# Patient Record
Sex: MALE | Race: OTHER | Employment: FULL TIME | URBAN - METROPOLITAN AREA
[De-identification: names, ages, dates, MRNs, and addresses within clinical notes are randomized per-mention and may not be internally consistent; named-entity substitution may affect disease eponyms.]

---

## 2022-02-08 ENCOUNTER — HOSPITAL ENCOUNTER (EMERGENCY)
Age: 67
Discharge: HOME | End: 2022-02-08
Attending: EMERGENCY MEDICINE
Payer: COMMERCIAL

## 2022-02-08 ENCOUNTER — APPOINTMENT (OUTPATIENT)
Dept: GENERAL RADIOLOGY | Age: 67
End: 2022-02-08
Payer: COMMERCIAL

## 2022-02-08 VITALS
HEIGHT: 67 IN | RESPIRATION RATE: 16 BRPM | SYSTOLIC BLOOD PRESSURE: 105 MMHG | WEIGHT: 200.6 LBS | HEART RATE: 91 BPM | BODY MASS INDEX: 31.48 KG/M2 | TEMPERATURE: 99.2 F | DIASTOLIC BLOOD PRESSURE: 67 MMHG | OXYGEN SATURATION: 96 %

## 2022-02-08 DIAGNOSIS — J18.9 PNEUMONIA DUE TO INFECTIOUS ORGANISM, UNSPECIFIED LATERALITY, UNSPECIFIED PART OF LUNG: Primary | ICD-10-CM

## 2022-02-08 LAB
A/G RATIO: 1.1 (ref 1.1–2.2)
ALBUMIN SERPL-MCNC: 3.9 G/DL (ref 3.4–5)
ALP BLD-CCNC: 119 U/L (ref 40–129)
ALT SERPL-CCNC: 16 U/L (ref 10–40)
ANION GAP SERPL CALCULATED.3IONS-SCNC: 11 MMOL/L (ref 3–16)
AST SERPL-CCNC: 22 U/L (ref 15–37)
BASOPHILS ABSOLUTE: 0 K/UL (ref 0–0.2)
BASOPHILS RELATIVE PERCENT: 0 %
BILIRUB SERPL-MCNC: 1 MG/DL (ref 0–1)
BUN BLDV-MCNC: 16 MG/DL (ref 7–20)
CALCIUM SERPL-MCNC: 9.8 MG/DL (ref 8.3–10.6)
CHLORIDE BLD-SCNC: 102 MMOL/L (ref 99–110)
CO2: 23 MMOL/L (ref 21–32)
CREAT SERPL-MCNC: 1.1 MG/DL (ref 0.8–1.3)
EOSINOPHILS ABSOLUTE: 0.1 K/UL (ref 0–0.6)
EOSINOPHILS RELATIVE PERCENT: 1.6 %
GFR AFRICAN AMERICAN: >60
GFR NON-AFRICAN AMERICAN: >60
GLUCOSE BLD-MCNC: 109 MG/DL (ref 70–99)
HCT VFR BLD CALC: 41.5 % (ref 40.5–52.5)
HEMOGLOBIN: 13.7 G/DL (ref 13.5–17.5)
LACTIC ACID: 1.1 MMOL/L (ref 0.4–2)
LIPASE: 25 U/L (ref 13–60)
LYMPHOCYTES ABSOLUTE: 1 K/UL (ref 1–5.1)
LYMPHOCYTES RELATIVE PERCENT: 12.2 %
MCH RBC QN AUTO: 28.8 PG (ref 26–34)
MCHC RBC AUTO-ENTMCNC: 33 G/DL (ref 31–36)
MCV RBC AUTO: 87.3 FL (ref 80–100)
MONOCYTES ABSOLUTE: 0.9 K/UL (ref 0–1.3)
MONOCYTES RELATIVE PERCENT: 10.9 %
NEUTROPHILS ABSOLUTE: 6.1 K/UL (ref 1.7–7.7)
NEUTROPHILS RELATIVE PERCENT: 75.3 %
PDW BLD-RTO: 14.5 % (ref 12.4–15.4)
PLATELET # BLD: 238 K/UL (ref 135–450)
PMV BLD AUTO: 8.6 FL (ref 5–10.5)
POTASSIUM SERPL-SCNC: 4.5 MMOL/L (ref 3.5–5.1)
RAPID INFLUENZA  B AGN: NEGATIVE
RAPID INFLUENZA A AGN: NEGATIVE
RBC # BLD: 4.76 M/UL (ref 4.2–5.9)
S PYO AG THROAT QL: NEGATIVE
SARS-COV-2, NAAT: NOT DETECTED
SODIUM BLD-SCNC: 136 MMOL/L (ref 136–145)
TOTAL PROTEIN: 7.6 G/DL (ref 6.4–8.2)
WBC # BLD: 8.1 K/UL (ref 4–11)

## 2022-02-08 PROCEDURE — 87880 STREP A ASSAY W/OPTIC: CPT

## 2022-02-08 PROCEDURE — 83605 ASSAY OF LACTIC ACID: CPT

## 2022-02-08 PROCEDURE — 83690 ASSAY OF LIPASE: CPT

## 2022-02-08 PROCEDURE — 87040 BLOOD CULTURE FOR BACTERIA: CPT

## 2022-02-08 PROCEDURE — 85025 COMPLETE CBC W/AUTO DIFF WBC: CPT

## 2022-02-08 PROCEDURE — 2580000003 HC RX 258: Performed by: EMERGENCY MEDICINE

## 2022-02-08 PROCEDURE — 96360 HYDRATION IV INFUSION INIT: CPT

## 2022-02-08 PROCEDURE — 6370000000 HC RX 637 (ALT 250 FOR IP): Performed by: EMERGENCY MEDICINE

## 2022-02-08 PROCEDURE — 99284 EMERGENCY DEPT VISIT MOD MDM: CPT

## 2022-02-08 PROCEDURE — 87804 INFLUENZA ASSAY W/OPTIC: CPT

## 2022-02-08 PROCEDURE — 96361 HYDRATE IV INFUSION ADD-ON: CPT

## 2022-02-08 PROCEDURE — 36415 COLL VENOUS BLD VENIPUNCTURE: CPT

## 2022-02-08 PROCEDURE — 87635 SARS-COV-2 COVID-19 AMP PRB: CPT

## 2022-02-08 PROCEDURE — 71045 X-RAY EXAM CHEST 1 VIEW: CPT

## 2022-02-08 PROCEDURE — 87081 CULTURE SCREEN ONLY: CPT

## 2022-02-08 PROCEDURE — 80053 COMPREHEN METABOLIC PANEL: CPT

## 2022-02-08 RX ORDER — CLOPIDOGREL BISULFATE 75 MG/1
75 TABLET ORAL DAILY
COMMUNITY
End: 2022-08-31 | Stop reason: SDUPTHER

## 2022-02-08 RX ORDER — 0.9 % SODIUM CHLORIDE 0.9 %
30 INTRAVENOUS SOLUTION INTRAVENOUS ONCE
Status: COMPLETED | OUTPATIENT
Start: 2022-02-08 | End: 2022-02-08

## 2022-02-08 RX ORDER — LEVOFLOXACIN 750 MG/1
750 TABLET ORAL DAILY
Qty: 5 TABLET | Refills: 0 | Status: SHIPPED | OUTPATIENT
Start: 2022-02-09 | End: 2022-02-14

## 2022-02-08 RX ORDER — ACETAMINOPHEN 325 MG/1
650 TABLET ORAL ONCE
Status: COMPLETED | OUTPATIENT
Start: 2022-02-08 | End: 2022-02-08

## 2022-02-08 RX ORDER — IBUPROFEN 400 MG/1
400 TABLET ORAL ONCE
Status: COMPLETED | OUTPATIENT
Start: 2022-02-08 | End: 2022-02-08

## 2022-02-08 RX ORDER — LEVOFLOXACIN 250 MG/1
750 TABLET ORAL ONCE
Status: COMPLETED | OUTPATIENT
Start: 2022-02-08 | End: 2022-02-08

## 2022-02-08 RX ADMIN — ACETAMINOPHEN 650 MG: 325 TABLET ORAL at 13:57

## 2022-02-08 RX ADMIN — SODIUM CHLORIDE 1983 ML: 9 INJECTION, SOLUTION INTRAVENOUS at 14:37

## 2022-02-08 RX ADMIN — LEVOFLOXACIN 750 MG: 250 TABLET, FILM COATED ORAL at 15:41

## 2022-02-08 RX ADMIN — IBUPROFEN 400 MG: 400 TABLET, FILM COATED ORAL at 13:57

## 2022-02-08 ASSESSMENT — PAIN SCALES - GENERAL
PAINLEVEL_OUTOF10: 3
PAINLEVEL_OUTOF10: 3
PAINLEVEL_OUTOF10: 0

## 2022-02-08 ASSESSMENT — PAIN DESCRIPTION - PAIN TYPE: TYPE: ACUTE PAIN

## 2022-02-08 NOTE — LETTER
[place] [address]  Phone: [phone]               February 8, 2022    Patient: Harriett Richardson   YOB: 1955   Date of Visit: 2/8/2022       To Whom It May Concern:    Harriett Richardson was seen and treated in our emergency department on 2/8/2022.  He may return to work 2/10/22      Sincerely,       Arabella Quezada RN         Signature:__________________________________

## 2022-02-11 LAB — S PYO THROAT QL CULT: NORMAL

## 2022-02-13 LAB
BLOOD CULTURE, ROUTINE: NORMAL
CULTURE, BLOOD 2: NORMAL

## 2022-05-19 ENCOUNTER — OFFICE VISIT (OUTPATIENT)
Dept: INTERNAL MEDICINE CLINIC | Age: 67
End: 2022-05-19
Payer: COMMERCIAL

## 2022-05-19 VITALS
SYSTOLIC BLOOD PRESSURE: 130 MMHG | HEIGHT: 66 IN | DIASTOLIC BLOOD PRESSURE: 88 MMHG | WEIGHT: 209 LBS | BODY MASS INDEX: 33.59 KG/M2 | HEART RATE: 83 BPM | OXYGEN SATURATION: 97 %

## 2022-05-19 DIAGNOSIS — I25.10 CORONARY ARTERY DISEASE DUE TO LIPID RICH PLAQUE: ICD-10-CM

## 2022-05-19 DIAGNOSIS — I25.5 ISCHEMIC CARDIOMYOPATHY: ICD-10-CM

## 2022-05-19 DIAGNOSIS — K21.00 GASTROESOPHAGEAL REFLUX DISEASE WITH ESOPHAGITIS WITHOUT HEMORRHAGE: ICD-10-CM

## 2022-05-19 DIAGNOSIS — K44.9 HIATAL HERNIA: ICD-10-CM

## 2022-05-19 DIAGNOSIS — Z12.5 SCREENING FOR PROSTATE CANCER: ICD-10-CM

## 2022-05-19 DIAGNOSIS — I10 PRIMARY HYPERTENSION: ICD-10-CM

## 2022-05-19 DIAGNOSIS — I25.5 ISCHEMIC CARDIOMYOPATHY: Primary | ICD-10-CM

## 2022-05-19 DIAGNOSIS — I25.83 CORONARY ARTERY DISEASE DUE TO LIPID RICH PLAQUE: ICD-10-CM

## 2022-05-19 DIAGNOSIS — R06.00 NOCTURNAL DYSPNEA: ICD-10-CM

## 2022-05-19 DIAGNOSIS — Z12.11 SCREEN FOR COLON CANCER: ICD-10-CM

## 2022-05-19 DIAGNOSIS — Z78.9 LANGUAGE BARRIER TO COMMUNICATION: ICD-10-CM

## 2022-05-19 LAB
BILIRUBIN URINE: NEGATIVE
BLOOD, URINE: NEGATIVE
CLARITY: CLEAR
COLOR: YELLOW
GLUCOSE URINE: NEGATIVE MG/DL
HCT VFR BLD CALC: 45 % (ref 40.5–52.5)
HEMOGLOBIN: 14.6 G/DL (ref 13.5–17.5)
KETONES, URINE: NEGATIVE MG/DL
LEUKOCYTE ESTERASE, URINE: NEGATIVE
MCH RBC QN AUTO: 29.9 PG (ref 26–34)
MCHC RBC AUTO-ENTMCNC: 32.5 G/DL (ref 31–36)
MCV RBC AUTO: 91.8 FL (ref 80–100)
MICROSCOPIC EXAMINATION: NORMAL
NITRITE, URINE: NEGATIVE
PDW BLD-RTO: 14 % (ref 12.4–15.4)
PH UA: 5.5 (ref 5–8)
PLATELET # BLD: 198 K/UL (ref 135–450)
PMV BLD AUTO: 8.9 FL (ref 5–10.5)
PROTEIN UA: NEGATIVE MG/DL
RBC # BLD: 4.9 M/UL (ref 4.2–5.9)
SPECIFIC GRAVITY UA: 1.02 (ref 1–1.03)
URINE TYPE: NORMAL
UROBILINOGEN, URINE: 1 E.U./DL
WBC # BLD: 7 K/UL (ref 4–11)

## 2022-05-19 PROCEDURE — G8417 CALC BMI ABV UP PARAM F/U: HCPCS | Performed by: INTERNAL MEDICINE

## 2022-05-19 PROCEDURE — 1036F TOBACCO NON-USER: CPT | Performed by: INTERNAL MEDICINE

## 2022-05-19 PROCEDURE — 3017F COLORECTAL CA SCREEN DOC REV: CPT | Performed by: INTERNAL MEDICINE

## 2022-05-19 PROCEDURE — 99204 OFFICE O/P NEW MOD 45 MIN: CPT | Performed by: INTERNAL MEDICINE

## 2022-05-19 PROCEDURE — G8427 DOCREV CUR MEDS BY ELIG CLIN: HCPCS | Performed by: INTERNAL MEDICINE

## 2022-05-19 RX ORDER — SPIRONOLACTONE 25 MG/1
25 TABLET ORAL DAILY
COMMUNITY

## 2022-05-19 RX ORDER — CARVEDILOL 12.5 MG/1
12.5 TABLET ORAL 2 TIMES DAILY WITH MEALS
COMMUNITY

## 2022-05-19 RX ORDER — FAMOTIDINE 20 MG/1
20 TABLET, FILM COATED ORAL NIGHTLY PRN
Qty: 90 TABLET | Refills: 0 | Status: SHIPPED | OUTPATIENT
Start: 2022-05-19

## 2022-05-19 RX ORDER — ROSUVASTATIN CALCIUM 20 MG/1
20 TABLET, COATED ORAL DAILY
COMMUNITY
End: 2022-08-31

## 2022-05-19 RX ORDER — FUROSEMIDE 40 MG/1
40 TABLET ORAL 2 TIMES DAILY
COMMUNITY
End: 2022-07-25

## 2022-05-19 ASSESSMENT — PATIENT HEALTH QUESTIONNAIRE - PHQ9
2. FEELING DOWN, DEPRESSED OR HOPELESS: 0
SUM OF ALL RESPONSES TO PHQ QUESTIONS 1-9: 0
SUM OF ALL RESPONSES TO PHQ QUESTIONS 1-9: 0
SUM OF ALL RESPONSES TO PHQ9 QUESTIONS 1 & 2: 0
SUM OF ALL RESPONSES TO PHQ QUESTIONS 1-9: 0
1. LITTLE INTEREST OR PLEASURE IN DOING THINGS: 0
SUM OF ALL RESPONSES TO PHQ QUESTIONS 1-9: 0

## 2022-05-19 ASSESSMENT — ENCOUNTER SYMPTOMS
NAUSEA: 0
VOMITING: 0
CHEST TIGHTNESS: 0
PHOTOPHOBIA: 0
ABDOMINAL PAIN: 0
VOICE CHANGE: 0
TROUBLE SWALLOWING: 0
WHEEZING: 0

## 2022-05-19 NOTE — LETTER
[facility]  [address] [place], [place] [address]  Phone: [phone]  Fax: [phone]    Mimi Mejia MD        May 19, 2022     Patient: Courtney Landrum   YOB: 1955   Date of Visit: 5/19/2022       To Whom It May Concern: It is my medical opinion that Courtney Landrum may return to work on 05/20/2022. If you have any questions or concerns, please don't hesitate to call.     Sincerely,      Electronically signed by Mimi Mejia MD on 5/19/2022 at 10:45 AM    Mimi Mejia MD

## 2022-05-20 LAB
A/G RATIO: 1.4 (ref 1.1–2.2)
ALBUMIN SERPL-MCNC: 4.1 G/DL (ref 3.4–5)
ALP BLD-CCNC: 101 U/L (ref 40–129)
ALT SERPL-CCNC: 17 U/L (ref 10–40)
ANION GAP SERPL CALCULATED.3IONS-SCNC: 12 MMOL/L (ref 3–16)
AST SERPL-CCNC: 19 U/L (ref 15–37)
BILIRUB SERPL-MCNC: 0.7 MG/DL (ref 0–1)
BUN BLDV-MCNC: 17 MG/DL (ref 7–20)
CALCIUM SERPL-MCNC: 9.9 MG/DL (ref 8.3–10.6)
CHLORIDE BLD-SCNC: 102 MMOL/L (ref 99–110)
CHOLESTEROL, TOTAL: 201 MG/DL (ref 0–199)
CO2: 24 MMOL/L (ref 21–32)
CREAT SERPL-MCNC: 1.2 MG/DL (ref 0.8–1.3)
GFR AFRICAN AMERICAN: >60
GFR NON-AFRICAN AMERICAN: >60
GLUCOSE BLD-MCNC: 99 MG/DL (ref 70–99)
HDLC SERPL-MCNC: 42 MG/DL (ref 40–60)
LDL CHOLESTEROL CALCULATED: 142 MG/DL
POTASSIUM SERPL-SCNC: 4.6 MMOL/L (ref 3.5–5.1)
PRO-BNP: 1570 PG/ML (ref 0–124)
PROSTATE SPECIFIC ANTIGEN: 1.33 NG/ML (ref 0–4)
SODIUM BLD-SCNC: 138 MMOL/L (ref 136–145)
TOTAL PROTEIN: 7.1 G/DL (ref 6.4–8.2)
TRIGL SERPL-MCNC: 84 MG/DL (ref 0–150)
TSH REFLEX: 1.58 UIU/ML (ref 0.27–4.2)
VLDLC SERPL CALC-MCNC: 17 MG/DL

## 2022-05-20 NOTE — RESULT ENCOUNTER NOTE
Patient should increase Crestor to 40 mg/day  He has elevated BNP level.   He should take Aldactone every day as well as Lasix  Make appointment with cardiologist.

## 2022-05-27 ENCOUNTER — TELEPHONE (OUTPATIENT)
Dept: CARDIOLOGY CLINIC | Age: 67
End: 2022-05-27

## 2022-05-27 NOTE — TELEPHONE ENCOUNTER
This will have to addressed during his appointment since he has not been seen by anyone in the office.

## 2022-05-27 NOTE — TELEPHONE ENCOUNTER
LVM on 5/27 to see if pt can come in to see Dr. Elizabeth Wang to get CC for surgery scheduled for 6/27/2022 and need to stop medicaton 7 days prior    Change to  in our [place] office to get cleared for surgery sooner than June 27, 2022 with Dr.Desai khan/renu/Juliana/ referral Dr. Claribel Trejo speaks Chinese/ Stage 3a chronic kidney disease ([facility] [address]  - Paroxysmal atrial fibrillation ([facility] [address]   - Essential hypertension  - Hyperlipidemia, unspecified hyperlipidemia type

## 2022-05-27 NOTE — TELEPHONE ENCOUNTER
LVM on 5/27 with evelyne Jain(okay per hipaa) to see if pt can move up appt and come in to see Dr. Donita Overton to get CC on 6/8/2022 for surgery scheduled for 6/27/2022 and need to stop Plavix 7 days prior instead of waiting to see Roxi Jaimes 6/24/2022      np/renu/Juliana/ referral Dr. Codey Redmond speaks Romanian/ Stage 3a chronic kidney disease ([facility] [address]  - Paroxysmal atrial fibrillation ([facility] [address]   - Essential hypertension  - Hyperlipidemia, unspecified hyperlipidemia type            CARDIAC CLEARANCE REQUEST    What type of procedure are you having: colonoscopy      Are you taking any blood thinners:   Yes, Plavix and will need to stop for 7 days     When is your procedure scheduled for:  June 27/2022    What physician is performing your procedure:  Dr. Katty Car    Phone Number:   [phone] ext [phone]     Fax number to send the letter: [phone]

## 2022-06-08 ENCOUNTER — OFFICE VISIT (OUTPATIENT)
Dept: CARDIOLOGY CLINIC | Age: 67
End: 2022-06-08
Payer: COMMERCIAL

## 2022-06-08 VITALS
HEART RATE: 85 BPM | DIASTOLIC BLOOD PRESSURE: 70 MMHG | HEIGHT: 66 IN | BODY MASS INDEX: 31.76 KG/M2 | OXYGEN SATURATION: 97 % | WEIGHT: 197.6 LBS | SYSTOLIC BLOOD PRESSURE: 110 MMHG

## 2022-06-08 DIAGNOSIS — I25.10 CORONARY ARTERY DISEASE INVOLVING NATIVE CORONARY ARTERY OF NATIVE HEART WITHOUT ANGINA PECTORIS: ICD-10-CM

## 2022-06-08 DIAGNOSIS — E78.2 MIXED HYPERLIPIDEMIA: ICD-10-CM

## 2022-06-08 DIAGNOSIS — I10 ESSENTIAL HYPERTENSION: ICD-10-CM

## 2022-06-08 DIAGNOSIS — I25.5 ISCHEMIC CARDIOMYOPATHY: ICD-10-CM

## 2022-06-08 DIAGNOSIS — Z01.810 PREOP CARDIOVASCULAR EXAM: Primary | ICD-10-CM

## 2022-06-08 DIAGNOSIS — Z87.11 HISTORY OF GASTRIC ULCER: ICD-10-CM

## 2022-06-08 PROCEDURE — 93000 ELECTROCARDIOGRAM COMPLETE: CPT | Performed by: INTERNAL MEDICINE

## 2022-06-08 PROCEDURE — 3017F COLORECTAL CA SCREEN DOC REV: CPT | Performed by: INTERNAL MEDICINE

## 2022-06-08 PROCEDURE — 99204 OFFICE O/P NEW MOD 45 MIN: CPT | Performed by: INTERNAL MEDICINE

## 2022-06-08 PROCEDURE — G8427 DOCREV CUR MEDS BY ELIG CLIN: HCPCS | Performed by: INTERNAL MEDICINE

## 2022-06-08 PROCEDURE — 1036F TOBACCO NON-USER: CPT | Performed by: INTERNAL MEDICINE

## 2022-06-08 PROCEDURE — G8417 CALC BMI ABV UP PARAM F/U: HCPCS | Performed by: INTERNAL MEDICINE

## 2022-06-08 RX ORDER — PANTOPRAZOLE SODIUM 40 MG/1
40 TABLET, DELAYED RELEASE ORAL DAILY
COMMUNITY

## 2022-06-08 NOTE — PATIENT INSTRUCTIONS
Patient Education        DASH Diet: Care Instructions  Your Care Instructions     The DASH diet is an eating plan that can help lower your blood pressure. DASH stands for Dietary Approaches to Stop Hypertension. Hypertension is high bloodpressure. The DASH diet focuses on eating foods that are high in calcium, potassium, and magnesium. These nutrients can lower blood pressure. The foods that are highest in these nutrients are fruits, vegetables, low-fat dairy products, nuts, seeds, and legumes. But taking calcium, potassium, and magnesium supplements instead of eating foods that are high in those nutrients does not have the same effect. The DASH diet also includes whole grains, fish, and poultry. The DASH diet is one of several lifestyle changes your doctor may recommend to lower your high blood pressure. Your doctor may also want you to decrease the amount of sodium in your diet. Lowering sodium while following the DASH dietcan lower blood pressure even further than just the DASH diet alone. Follow-up care is a key part of your treatment and safety. Be sure to make and go to all appointments, and call your doctor if you are having problems. It's also a good idea to know your test results and keep alist of the medicines you take. How can you care for yourself at home? Following the DASH diet   Eat 4 to 5 servings of fruit each day. A serving is 1 medium-sized piece of fruit, ½ cup chopped or canned fruit, 1/4 cup dried fruit, or 4 ounces (½ cup) of fruit juice. Choose fruit more often than fruit juice.  Eat 4 to 5 servings of vegetables each day. A serving is 1 cup of lettuce or raw leafy vegetables, ½ cup of chopped or cooked vegetables, or 4 ounces (½ cup) of vegetable juice. Choose vegetables more often than vegetable juice.  Get 2 to 3 servings of low-fat and fat-free dairy each day. A serving is 8 ounces of milk, 1 cup of yogurt, or 1 ½ ounces of cheese.  Eat 6 to 8 servings of grains each day.  A serving is 1 slice of bread, 1 ounce of dry cereal, or ½ cup of cooked rice, pasta, or cooked cereal. Try to choose whole-grain products as much as possible.  Limit lean meat, poultry, and fish to 2 servings each day. A serving is 3 ounces, about the size of a deck of cards.  Eat 4 to 5 servings of nuts, seeds, and legumes (cooked dried beans, lentils, and split peas) each week. A serving is 1/3 cup of nuts, 2 tablespoons of seeds, or ½ cup of cooked beans or peas.  Limit fats and oils to 2 to 3 servings each day. A serving is 1 teaspoon of vegetable oil or 2 tablespoons of salad dressing.  Limit sweets and added sugars to 5 servings or less a week. A serving is 1 tablespoon jelly or jam, ½ cup sorbet, or 1 cup of lemonade.  Eat less than 2,300 milligrams (mg) of sodium a day. If you limit your sodium to 1,500 mg a day, you can lower your blood pressure even more.  Be aware that all of these are the suggested number of servings for people who eat 1,800 to 2,000 calories a day. Your recommended number of servings may be different if you need more or fewer calories. Tips for success   Start small. Do not try to make dramatic changes to your diet all at once. You might feel that you are missing out on your favorite foods and then be more likely to not follow the plan. Make small changes, and stick with them. Once those changes become habit, add a few more changes.  Try some of the following:  ? Make it a goal to eat a fruit or vegetable at every meal and at snacks. This will make it easy to get the recommended amount of fruits and vegetables each day. ? Try yogurt topped with fruit and nuts for a snack or healthy dessert. ? Add lettuce, tomato, cucumber, and onion to sandwiches. ? Combine a ready-made pizza crust with low-fat mozzarella cheese and lots of vegetable toppings. Try using tomatoes, squash, spinach, broccoli, carrots, cauliflower, and onions. ?  Have a variety of cut-up vegetables with a low-fat dip as an appetizer instead of chips and dip. ? Sprinkle sunflower seeds or chopped almonds over salads. Or try adding chopped walnuts or almonds to cooked vegetables. ? Try some vegetarian meals using beans and peas. Add garbanzo or kidney beans to salads. Make burritos and tacos with mashed walter beans or black beans. Where can you learn more? Go to https://NurseLiability.com.Inhibitex. org and sign in to your StrikeIron account. Enter O583 in the SmartThings box to learn more about \"DASH Diet: Care Instructions. \"     If you do not have an account, please click on the \"Sign Up Now\" link. Current as of: January 10, 2022               Content Version: 13.2  © 4461-0751 Healthwise, Incorporated. Care instructions adapted under license by [facility] ([facility]). If you have questions about a medical condition or this instruction, always ask your healthcare professional. [address] any warranty or liability for your use of this information.

## 2022-06-08 NOTE — PROGRESS NOTES
[facility]      Cardiology Consult    Dorenda Sessions  [id number]    June 8, 2022    Referring Physician: Jefferson aWshington MD  Reason for Referral: Pre-operative risk assessment     CC: \"I need a colonoscopy. \"     HPI:  The patient is 61 y.o. male with a past medical history significant for hypertension, hyperlipidemia, ischemic cardiomyopathy, and coronary artery disease who presents today for a pre-operative risk assessment prior to a colonoscopy scheduled 6/27/22. Today, he presents with his wife and a Malay phone  was used during the visit today. He reports a history of a prior MI in 2020 while living in [facility] but states a coronary angiogram was not performed. He states he would be treated with medical therapy. A prior stress test was completed 3/2021 showed a large fixed defect with an LVEF of 28%. He denies a prior echocardiogram and one is ordered but not yet scheduled. He reports a history of gastric ulcers and follows with GI. He states he is scheduled for the colonoscopy and will likely undergo an EGD afterwards. He states overall he is doing well but notes shortness of breath when bending over but denies any chest pains or worsening SEGAL. He states he continues to remain active with working and exercising without any exertional symptoms. He reports compliance with his medications and tolerating. He denies any abnormal bleeding or bruising. Patient denies exertional chest pain/pressure, dyspnea at rest, worsening SEGAL, PND, orthopnea, palpitations, lightheadedness, weight changes, changes in LE edema, and syncope. Past Medical History:   Diagnosis Date    CAD (coronary artery disease)     Hyperlipidemia     Hypertension     Ischemic cardiomyopathy     MI (myocardial infarction) ([facility] [address]      History reviewed. No pertinent surgical history. History reviewed. No pertinent family history.   Social History     Tobacco Use    Smoking status: Never Smoker    Smokeless tobacco: Never Used   Vaping Use    Vaping Use: Never used   Substance Use Topics    Alcohol use: Never    Drug use: Never     Allergies   Allergen Reactions    Aspirin     Pcn [Penicillins]      Current Outpatient Medications   Medication Sig Dispense Refill    pantoprazole (PROTONIX) 40 MG tablet Take 40 mg by mouth daily      furosemide (LASIX) 40 MG tablet Take 40 mg by mouth 2 times daily      spironolactone (ALDACTONE) 25 MG tablet Take 25 mg by mouth daily      carvedilol (COREG) 12.5 MG tablet Take 12.5 mg by mouth 2 times daily (with meals)      rosuvastatin (CRESTOR) 20 MG tablet Take 20 mg by mouth daily      famotidine (PEPCID) 20 MG tablet Take 1 tablet by mouth nightly as needed (cough and heart burn) 90 tablet 0    clopidogrel (PLAVIX) 75 MG tablet Take 75 mg by mouth daily       No current facility-administered medications for this visit. Review of Systems:  · Constitutional: No unanticipated weight loss. There's been no change in energy level, sleep pattern, or activity level. No fevers, chills. · Eyes: No visual changes or diplopia. No scleral icterus. · ENT: No Headaches, hearing loss or vertigo. No mouth sores or sore throat. · Cardiovascular: as reviewed in HPI  · Respiratory: No cough or wheezing, no sputum production. No hemoptysis. · Gastrointestinal: No abdominal pain, appetite loss, blood in stools. No change in bowel or bladder habits. · Genitourinary: No dysuria, trouble voiding, or hematuria. · Musculoskeletal:  No gait disturbance, no joint complaints. · Integumentary: No rash or pruritis. · Neurological: No headache, diplopia, change in muscle strength, numbness or tingling. · Psychiatric: No anxiety or depression. · Endocrine: No temperature intolerance. No excessive thirst, fluid intake, or urination. No tremor. · Hematologic/Lymphatic: No abnormal bruising or bleeding, blood clots or swollen lymph nodes.   · Allergic/Immunologic: No nasal congestion or hives.    Physical Exam:   /70 (Site: Left Upper Arm, Position: Sitting, Cuff Size: Large Adult)   Pulse 85   Ht 5' 5.5\" (1.664 m)   Wt 197 lb 9.6 oz (89.6 kg)   SpO2 97%   BMI 32.38 kg/m²   Wt Readings from Last 3 Encounters:   06/08/22 197 lb 9.6 oz (89.6 kg)   05/19/22 209 lb (94.8 kg)   02/08/22 200 lb 9.6 oz (91 kg)     Constitutional: He is oriented to person, place, and time. He appears well-developed and well-nourished. In no acute distress. Head: Normocephalic and atraumatic. Pupils equal and round. Neck: Neck supple. No JVP or carotid bruit appreciated. No mass and no thyromegaly present. No lymphadenopathy present. Cardiovascular: Normal rate. Normal heart sounds. Exam reveals no gallop and no friction rub. No murmur heard. Pulmonary/Chest: Effort normal and breath sounds normal. No respiratory distress. He has no wheezes, rhonchi or rales. Abdominal: Soft, non-tender. Bowel sounds are normal. He exhibits no organomegaly, mass or bruit. Extremities: No edema. No cyanosis or clubbing. Pulses are 2+ radial/carotid bilaterally. Neurological: No gross cranial nerve deficit. Coordination normal.   Skin: Skin is warm and dry. There is no rash or diaphoresis. Psychiatric: He has a normal mood and affect. His speech is normal and behavior is normal.     Lab Review:   FLP:    Lab Results   Component Value Date    TRIG 84 05/19/2022    HDL 42 05/19/2022    LDLCALC 142 05/19/2022    LABVLDL 17 05/19/2022     BUN/Creatinine:    Lab Results   Component Value Date    BUN 17 05/19/2022    CREATININE 1.2 05/19/2022     EKG Interpretation: 6/8/22 Sinus rhythm. Possible left atrial enlargement. Extensive anterior-lateral and infero-posterior infarct. Nonspecific ST-T wave abnormalities.       Image Review:     Stress test 3/2021   No stress induced ischemia   Large fixed severe perfusion defect involving the apical and mid portion of the walls   LVEF is 28%    Assessment/Plan:   1) Pre-operative risk assessment. Patient is interemdiate cardiac risk based on RCRI score of two (CAD and CHF). Patient's risk should not preclude him from proceeding with endoscopy. Suggest continuation of B-blocker and statin in the smith-operative period. May hold the Plavix 5-7 days prior and resume when possible. 2) CAD. Reports prior MI in 2020 with no coronary angiogram performed. Seemingly asymptomatic. Continue with medical management and risk factor modification including statin, ASA, Plavix and B-blocker. 3) Ischemic cardiomyopathy. EF 28% per stress test report but no echo on file. Pt appears euvolemic today but reports chronic SEGAL. Continue with medical therapy including B-blocker, aldactone, and Lasix 40mg BID. Encouraged a low sodium diet. Will arrange for an echocardiogram and additional medical therapy discussed but will await echo result. 4) Essential hypertension. Controlled. Goal BP <130/80. Continue medical therapy. 5) Hyperlipidemia. Continue high intensity statin with Crestor 20 mg daily. .     6) History of gastric ulcer. Continue PPI and management per GI. Follow up after the echocardiogram.     Thank you very much for allowing me to participate in the care of your patient. Please do not hesitate to contact me if you have any questions. Sincerely,  Libertad Obrien. Kat Baez, [address] [place] [address]  Ph: [phone]  Fax: [phone]    This note was scribed in the presence of Dr Kat Baez MD by Beau Farah RN. Physician Attestation: The scribes documentation has been prepared under my direction and personally reviewed by me in its entirety. I confirm that the note above accurately reflects all work, treatment, procedures, and medical decision making performed by me.    All portions of the note including but not limited to the chief complaint, history of present illness, physical exam, assessment and plan/medical decision making were personally reviewed, edited, and updated on the day of the visit.

## 2022-07-01 ENCOUNTER — OFFICE VISIT (OUTPATIENT)
Dept: INTERNAL MEDICINE CLINIC | Age: 67
End: 2022-07-01
Payer: COMMERCIAL

## 2022-07-01 VITALS
DIASTOLIC BLOOD PRESSURE: 88 MMHG | HEART RATE: 83 BPM | SYSTOLIC BLOOD PRESSURE: 122 MMHG | BODY MASS INDEX: 32.95 KG/M2 | OXYGEN SATURATION: 97 % | HEIGHT: 66 IN | WEIGHT: 205 LBS

## 2022-07-01 DIAGNOSIS — Z78.9 LANGUAGE BARRIER TO COMMUNICATION: ICD-10-CM

## 2022-07-01 DIAGNOSIS — I25.83 CORONARY ARTERY DISEASE DUE TO LIPID RICH PLAQUE: ICD-10-CM

## 2022-07-01 DIAGNOSIS — R19.8 ABNORMAL FINDINGS ON ESOPHAGOGASTRODUODENOSCOPY (EGD): ICD-10-CM

## 2022-07-01 DIAGNOSIS — E78.00 PURE HYPERCHOLESTEROLEMIA: ICD-10-CM

## 2022-07-01 DIAGNOSIS — I10 PRIMARY HYPERTENSION: ICD-10-CM

## 2022-07-01 DIAGNOSIS — I25.10 CORONARY ARTERY DISEASE DUE TO LIPID RICH PLAQUE: ICD-10-CM

## 2022-07-01 DIAGNOSIS — I25.5 ISCHEMIC CARDIOMYOPATHY: Primary | ICD-10-CM

## 2022-07-01 PROCEDURE — G8417 CALC BMI ABV UP PARAM F/U: HCPCS | Performed by: INTERNAL MEDICINE

## 2022-07-01 PROCEDURE — G8427 DOCREV CUR MEDS BY ELIG CLIN: HCPCS | Performed by: INTERNAL MEDICINE

## 2022-07-01 PROCEDURE — 99214 OFFICE O/P EST MOD 30 MIN: CPT | Performed by: INTERNAL MEDICINE

## 2022-07-01 PROCEDURE — 1036F TOBACCO NON-USER: CPT | Performed by: INTERNAL MEDICINE

## 2022-07-01 PROCEDURE — 3017F COLORECTAL CA SCREEN DOC REV: CPT | Performed by: INTERNAL MEDICINE

## 2022-07-01 ASSESSMENT — ENCOUNTER SYMPTOMS
WHEEZING: 0
VOMITING: 0
VOICE CHANGE: 0
NAUSEA: 0
SHORTNESS OF BREATH: 0
TROUBLE SWALLOWING: 0
ABDOMINAL PAIN: 0

## 2022-07-01 NOTE — PROGRESS NOTES
T[place] Fuentes  1955  male  61 y.o. SUBJECTIVE:       Chief Complaint   Patient presents with    Follow-up     6 wk       HPI:  Follow-up visit for chronic problems. Since last visit patient has been evaluated by cardiologist as well as underwent colonoscopy and endoscopy. Overall he noticed improvement of his exertional dyspnea. Denies chest pain palpitation leg swelling. Past Medical History:   Diagnosis Date    CAD (coronary artery disease)     Hyperlipidemia     Hypertension     Ischemic cardiomyopathy     MI (myocardial infarction) ([facility] [address]      History reviewed. No pertinent surgical history. Social History     Socioeconomic History    Marital status:      Spouse name: None    Number of children: None    Years of education: None    Highest education level: None   Occupational History    None   Tobacco Use    Smoking status: Never Smoker    Smokeless tobacco: Never Used   Vaping Use    Vaping Use: Never used   Substance and Sexual Activity    Alcohol use: Never    Drug use: Never    Sexual activity: Not Currently   Other Topics Concern    None   Social History Narrative    None     Social Determinants of Health     Financial Resource Strain:     Difficulty of Paying Living Expenses: Not on file   Food Insecurity:     Worried About Running Out of Food in the Last Year: Not on file    Nathan of Food in the Last Year: Not on file   Transportation Needs:     Lack of Transportation (Medical): Not on file    Lack of Transportation (Non-Medical):  Not on file   Physical Activity:     Days of Exercise per Week: Not on file    Minutes of Exercise per Session: Not on file   Stress:     Feeling of Stress : Not on file   Social Connections:     Frequency of Communication with Friends and Family: Not on file    Frequency of Social Gatherings with Friends and Family: Not on file    Attends Tenriism Services: Not on file    Active Member of Clubs or Organizations: Not on file    Attends Club or Organization Meetings: Not on file    Marital Status: Not on file   Intimate Partner Violence:     Fear of Current or Ex-Partner: Not on file    Emotionally Abused: Not on file    Physically Abused: Not on file    Sexually Abused: Not on file   Housing Stability:     Unable to Pay for Housing in the Last Year: Not on file    Number of [place] in the Last Year: Not on file    Unstable Housing in the Last Year: Not on file     History reviewed. No pertinent family history. Review of Systems   Constitutional: Negative for diaphoresis and unexpected weight change. HENT: Negative for trouble swallowing and voice change. Respiratory: Negative for shortness of breath and wheezing. Gastrointestinal: Negative for abdominal pain, nausea and vomiting. Neurological: Negative for dizziness, light-headedness and headaches. OBJECTIVE:  Pulse Readings from Last 4 Encounters:   07/01/22 83   06/08/22 85   05/19/22 83   02/08/22 91     Wt Readings from Last 4 Encounters:   07/01/22 205 lb (93 kg)   06/08/22 197 lb 9.6 oz (89.6 kg)   05/19/22 209 lb (94.8 kg)   02/08/22 200 lb 9.6 oz (91 kg)     BP Readings from Last 4 Encounters:   07/01/22 122/88   06/08/22 110/70   05/19/22 130/88   02/08/22 105/67     Physical Exam  Vitals and nursing note reviewed. Constitutional:       Appearance: He is not ill-appearing. Eyes:      Conjunctiva/sclera: Conjunctivae normal.   Cardiovascular:      Rate and Rhythm: Normal rate and regular rhythm. Pulses: Normal pulses. Heart sounds: Normal heart sounds. Pulmonary:      Effort: Pulmonary effort is normal.      Breath sounds: Normal breath sounds. Abdominal:      General: Bowel sounds are normal.   Musculoskeletal:      Right lower leg: No edema. Left lower leg: No edema. Neurological:      General: No focal deficit present. Mental Status: He is alert and oriented to person, place, and time.    Psychiatric: Mood and Affect: Mood normal.         Behavior: Behavior normal.         CBC:   Lab Results   Component Value Date/Time    WBC 7.0 05/19/2022 10:55 AM    HGB 14.6 05/19/2022 10:55 AM    HCT 45.0 05/19/2022 10:55 AM     05/19/2022 10:55 AM     CMP:  Lab Results   Component Value Date/Time     05/19/2022 10:55 AM    K 4.6 05/19/2022 10:55 AM     05/19/2022 10:55 AM    CO2 24 05/19/2022 10:55 AM    ANIONGAP 12 05/19/2022 10:55 AM    GLUCOSE 99 05/19/2022 10:55 AM    BUN 17 05/19/2022 10:55 AM    CREATININE 1.2 05/19/2022 10:55 AM    GFRAA >60 05/19/2022 10:55 AM    CALCIUM 9.9 05/19/2022 10:55 AM    PROT 7.1 05/19/2022 10:55 AM    LABALBU 4.1 05/19/2022 10:55 AM    AGRATIO 1.4 05/19/2022 10:55 AM    BILITOT 0.7 05/19/2022 10:55 AM    ALKPHOS 101 05/19/2022 10:55 AM    ALT 17 05/19/2022 10:55 AM    AST 19 05/19/2022 10:55 AM     URINALYSIS:  Lab Results   Component Value Date/Time    GLUCOSEU Negative 05/19/2022 10:55 AM    KETUA Negative 05/19/2022 10:55 AM    SPECGRAV 1.021 05/19/2022 10:55 AM    BLOODU Negative 05/19/2022 10:55 AM    PHUR 5.5 05/19/2022 10:55 AM    PROTEINU Negative 05/19/2022 10:55 AM    NITRU Negative 05/19/2022 10:55 AM    LEUKOCYTESUR Negative 05/19/2022 10:55 AM    LABMICR Not Indicated 05/19/2022 10:55 AM    URINETYPE NotGiven 05/19/2022 10:55 AM     MICRO/ALB:   Lab Results   Component Value Date/Time    LABMICR Not Indicated 05/19/2022 10:55 AM     LIPID:  Lab Results   Component Value Date/Time    CHOL 201 05/19/2022 10:55 AM    TRIG 84 05/19/2022 10:55 AM    HDL 42 05/19/2022 10:55 AM    LDLCALC 142 05/19/2022 10:55 AM    LABVLDL 17 05/19/2022 10:55 AM     TSH:   Lab Results   Component Value Date/Time    TSHREFLEX 1.58 05/19/2022 10:55 AM     PSA:   Lab Results   Component Value Date/Time    PSA 1.33 05/19/2022 10:55 AM        ASSESSMENT/PLAN:  Assessment/Plan:  Peggy Carcamo was seen today for follow-up.     Diagnoses and all orders for this visit:    Ischemic cardiomyopathy  Primary hypertension  Language barrier to communication  Turkish interpretative service is used. Coronary artery disease due to lipid rich plaque  Continue Plavix, rosuvastatin, carvedilol, pantoprazole, furosemide  Pure hypercholesterolemia  Continue rosuvastatin. Denies muscle pain joint pain body  Abnormal findings on esophagogastroduodenoscopy (EGD)  History of duodenal ulcer. He  is status post endoscopy with gastric polyp. Biopsy result pending. I advised patient to call gastroenterologist for follow-up on biopsy report today. An After Visit Summary was printed and given to the patient. Documentation was done using voice recognition dragon software. Every effort was made to ensure accuracy; however, inadvertent  Unintentional computerized transcription errors may be present. No orders of the defined types were placed in this encounter. Current Outpatient Medications   Medication Sig Dispense Refill    pantoprazole (PROTONIX) 40 MG tablet Take 40 mg by mouth daily      furosemide (LASIX) 40 MG tablet Take 40 mg by mouth 2 times daily      spironolactone (ALDACTONE) 25 MG tablet Take 25 mg by mouth daily      carvedilol (COREG) 12.5 MG tablet Take 12.5 mg by mouth 2 times daily (with meals)      rosuvastatin (CRESTOR) 20 MG tablet Take 20 mg by mouth daily      famotidine (PEPCID) 20 MG tablet Take 1 tablet by mouth nightly as needed (cough and heart burn) 90 tablet 0    clopidogrel (PLAVIX) 75 MG tablet Take 75 mg by mouth daily       No current facility-administered medications for this visit. Return in about 4 months (around 11/1/2022) for physical.  An After Visit Summary was printed and given to the patient. Documentation was done using voice recognition dragon software. Every effort was made to ensure accuracy; however, inadvertent  Unintentional computerized transcription errors may be present.

## 2022-07-20 ENCOUNTER — HOSPITAL ENCOUNTER (OUTPATIENT)
Dept: CARDIOLOGY | Age: 67
Discharge: HOME | End: 2022-07-20
Payer: COMMERCIAL

## 2022-07-20 DIAGNOSIS — I25.10 CORONARY ARTERY DISEASE INVOLVING NATIVE CORONARY ARTERY OF NATIVE HEART WITHOUT ANGINA PECTORIS: ICD-10-CM

## 2022-07-20 DIAGNOSIS — I25.5 ISCHEMIC CARDIOMYOPATHY: ICD-10-CM

## 2022-07-20 LAB
LV EF: 23 %
LVEF MODALITY: NORMAL

## 2022-07-20 PROCEDURE — 93306 TTE W/DOPPLER COMPLETE: CPT

## 2022-07-25 DIAGNOSIS — I25.5 ISCHEMIC CARDIOMYOPATHY: ICD-10-CM

## 2022-07-25 DIAGNOSIS — R06.00 NOCTURNAL DYSPNEA: ICD-10-CM

## 2022-07-25 DIAGNOSIS — I25.83 CORONARY ARTERY DISEASE DUE TO LIPID RICH PLAQUE: ICD-10-CM

## 2022-07-25 DIAGNOSIS — I25.10 CORONARY ARTERY DISEASE DUE TO LIPID RICH PLAQUE: ICD-10-CM

## 2022-07-25 DIAGNOSIS — I10 PRIMARY HYPERTENSION: ICD-10-CM

## 2022-07-25 RX ORDER — FUROSEMIDE 40 MG/1
40 TABLET ORAL DAILY
Qty: 60 TABLET | Refills: 3
Start: 2022-07-25 | End: 2022-08-31

## 2022-07-26 NOTE — PROGRESS NOTES
[address]      Cardiology Consult    Anneliese Briseno  [birth year] July 27, 2022    Referring Physician: Chuck Rivera MD  Reason for Referral: CAD, ischemic cardiomyopathy     CC: \"Feeling fine. \"     HPI:  The patient is 61 y.o. male with a past medical history significant for hypertension, hyperlipidemia, ischemic cardiomyopathy, and coronary artery disease who presents today for management of heart failure. He was initially seen for a pre-operative risk assessment prior to a colonoscopy scheduled 6/27/22. He presented with his wife and a [place] phone  was used during the visit. He reported a history of a prior MI in 2020 while living in [facility] but states a coronary angiogram was not performed. He stated he would be treated with medical therapy. A prior stress test was completed 3/2021 showed a large fixed defect with an LVEF of 28%. He denied a prior echocardiogram. He reported a history of gastric ulcers and follows with GI. He completed an echocardiogram that showed a severely reduced LVEF at 25%. Today, he states overall he is doing well and accompanied by his wife. The phone  was used for the visit today. denies any chest pains or worsening SEGAL. He states he continues to remain active with working and exercising without any exertional symptoms. He reports compliance with his medications and tolerating. He denies any abnormal bleeding or bruising. Patient denies exertional chest pain/pressure, dyspnea at rest, SEGAL, PND, orthopnea, palpitations, lightheadedness, weight changes, changes in LE edema, and syncope. Past Medical History:   Diagnosis Date    CAD (coronary artery disease)     Hyperlipidemia     Hypertension     Ischemic cardiomyopathy     MI (myocardial infarction) ([facility] [address]      History reviewed. No pertinent surgical history. History reviewed. No pertinent family history.   Social History     Tobacco Use    Smoking status: Never    Smokeless tobacco: Never   Vaping Use    Vaping Use: Never used   Substance Use Topics    Alcohol use: Never    Drug use: Never     Allergies   Allergen Reactions    Aspirin     Pcn [Penicillins]      Current Outpatient Medications   Medication Sig Dispense Refill    furosemide (LASIX) 40 MG tablet Take 1 tablet by mouth in the morning. (Patient taking differently: Take 40 mg by mouth in the morning. Patient taking 1 tablet pm.) 60 tablet 3    sacubitril-valsartan (ENTRESTO) 24-26 MG per tablet Take 1 tablet by mouth in the morning and 1 tablet before bedtime. 60 tablet 3    pantoprazole (PROTONIX) 40 MG tablet Take 40 mg by mouth daily      spironolactone (ALDACTONE) 25 MG tablet Take 25 mg by mouth daily      carvedilol (COREG) 12.5 MG tablet Take 12.5 mg by mouth 2 times daily (with meals)      rosuvastatin (CRESTOR) 20 MG tablet Take 20 mg by mouth daily      famotidine (PEPCID) 20 MG tablet Take 1 tablet by mouth nightly as needed (cough and heart burn) 90 tablet 0    clopidogrel (PLAVIX) 75 MG tablet Take 75 mg by mouth daily       No current facility-administered medications for this visit. Review of Systems:  Constitutional: No unanticipated weight loss. There's been no change in energy level, sleep pattern, or activity level. No fevers, chills. Eyes: No visual changes or diplopia. No scleral icterus. ENT: No Headaches, hearing loss or vertigo. No mouth sores or sore throat. Cardiovascular: as reviewed in HPI  Respiratory: No cough or wheezing, no sputum production. No hemoptysis. Gastrointestinal: No abdominal pain, appetite loss, blood in stools. No change in bowel or bladder habits. Genitourinary: No dysuria, trouble voiding, or hematuria. Musculoskeletal:  No gait disturbance, no joint complaints. Integumentary: No rash or pruritis. Neurological: No headache, diplopia, change in muscle strength, numbness or tingling. Psychiatric: No anxiety or depression. Endocrine: No temperature intolerance. No excessive thirst, fluid intake, or urination. No tremor. Hematologic/Lymphatic: No abnormal bruising or bleeding, blood clots or swollen lymph nodes. Allergic/Immunologic: No nasal congestion or hives. Physical Exam:   /78   Pulse 77   Ht 5' 5.5\" (1.664 m)   Wt 204 lb 6.4 oz (92.7 kg)   SpO2 98%   BMI 33.50 kg/m²   Wt Readings from Last 3 Encounters:   07/27/22 204 lb 6.4 oz (92.7 kg)   07/01/22 205 lb (93 kg)   06/08/22 197 lb 9.6 oz (89.6 kg)     Constitutional: He is oriented to person, place, and time. He appears well-developed and well-nourished. In no acute distress. Head: Normocephalic and atraumatic. Pupils equal and round. Neck: Neck supple. No JVP or carotid bruit appreciated. No mass and no thyromegaly present. No lymphadenopathy present. Cardiovascular: Normal rate. Normal heart sounds. Exam reveals no gallop and no friction rub. No murmur heard. Pulmonary/Chest: Effort normal and breath sounds normal. No respiratory distress. He has no wheezes, rhonchi or rales. Abdominal: Soft, non-tender. Bowel sounds are normal. He exhibits no organomegaly, mass or bruit. Extremities: No edema. No cyanosis or clubbing. Pulses are 2+ radial/carotid bilaterally. Neurological: No gross cranial nerve deficit. Coordination normal.   Skin: Skin is warm and dry. There is no rash or diaphoresis. Psychiatric: He has a normal mood and affect. His speech is normal and behavior is normal.     Lab Review:   FLP:    Lab Results   Component Value Date/Time    TRIG 84 05/19/2022 10:55 AM    HDL 42 05/19/2022 10:55 AM    LDLCALC 142 05/19/2022 10:55 AM    LABVLDL 17 05/19/2022 10:55 AM     BUN/Creatinine:    Lab Results   Component Value Date/Time    BUN 17 05/19/2022 10:55 AM    CREATININE 1.2 05/19/2022 10:55 AM     EKG Interpretation: 6/8/22 Sinus rhythm. Possible left atrial enlargement. Extensive anterior-lateral and infero-posterior infarct. Nonspecific ST-T wave abnormalities.       Image Review: Stress test 3/2021   No stress induced ischemia   Large fixed severe perfusion defect involving the apical and mid portion of the walls   LVEF is 28%. Echo 7/20/22   The left ventricle appears dilated. Globally reduced left ventricular systolic function with an estimated ejection fraction of 20-25%. Grade II diastolic dysfunction with elevated LV filling pressures. Mild mitral regurgitation. The left atrium is moderately dilated. The right ventricle is normal in size and function. Mild to moderate tricuspid regurgitation. RVSP is 42 mmHg, this is consistent with mild-moderate pulmonary hypertension. Assessment/Plan:   1) CAD. Reports prior MI in 2020 with no coronary angiogram performed. Seemingly asymptomatic. Continue with medical management and risk factor modification including statin, Plavix, and B-blocker. Not on ASA with PUD. 2) Ischemic cardiomyopathy. EF 20-25%. Pt appears euvolemic today. Continue with medical therapy including Entresto 24-26 mg BID, B-blocker, aldactone, and Lasix 40 mg daily. Encouraged low sodium diet and monitor weights daily. Will repeat BMP in 2 weeks. If tolerating Entresto instructed to start Jardiance 10 mg daily in about 2 weeks. Continue to titrate medications as tolerated and will repeat echocardiogram after maximal medical therapy. Discussed ICD referral if EF does not improve with medical therapy. 3) Essential hypertension. Controlled. Goal BP <130/80. Continue medical therapy. 4) Hyperlipidemia. Continue high intensity statin with Crestor 20 mg daily. . Will increase to Crestor 40mg at follow-up. Will consider addition of PCSK9i if not controlled. 5) History of gastric ulcer. Continue PPI and management per GI. Follow up 4-6 weeks. Thank you very much for allowing me to participate in the care of your patient. Please do not hesitate to contact me if you have any questions. Sincerely,  Magali Riley.  Ami Tierney, [address] [address]  Ph: [phone]  Fax: [phone]    This note was scribed in the presence of Dr Alis Godinez MD by Edith Yañez RN. Physician Attestation: The scribes documentation has been prepared under my direction and personally reviewed by me in its entirety. I confirm that the note above accurately reflects all work, treatment, procedures, and medical decision making performed by me. All portions of the note including but not limited to the chief complaint, history of present illness, physical exam, assessment and plan/medical decision making were personally reviewed, edited, and updated on the day of the visit.

## 2022-07-27 ENCOUNTER — OFFICE VISIT (OUTPATIENT)
Dept: CARDIOLOGY CLINIC | Age: 67
End: 2022-07-27
Payer: COMMERCIAL

## 2022-07-27 VITALS
BODY MASS INDEX: 32.85 KG/M2 | WEIGHT: 204.4 LBS | SYSTOLIC BLOOD PRESSURE: 112 MMHG | HEART RATE: 77 BPM | OXYGEN SATURATION: 98 % | HEIGHT: 66 IN | DIASTOLIC BLOOD PRESSURE: 78 MMHG

## 2022-07-27 DIAGNOSIS — I25.10 CORONARY ARTERY DISEASE INVOLVING NATIVE CORONARY ARTERY OF NATIVE HEART WITHOUT ANGINA PECTORIS: Primary | ICD-10-CM

## 2022-07-27 DIAGNOSIS — I10 ESSENTIAL HYPERTENSION: ICD-10-CM

## 2022-07-27 DIAGNOSIS — Z87.11 HISTORY OF GASTRIC ULCER: ICD-10-CM

## 2022-07-27 DIAGNOSIS — I25.5 ISCHEMIC CARDIOMYOPATHY: ICD-10-CM

## 2022-07-27 DIAGNOSIS — E78.2 MIXED HYPERLIPIDEMIA: ICD-10-CM

## 2022-07-27 PROCEDURE — 99214 OFFICE O/P EST MOD 30 MIN: CPT | Performed by: INTERNAL MEDICINE

## 2022-08-29 NOTE — PROGRESS NOTES
[facility]      Cardiology Consult    Eunice Orellana  [birth year]    August 31, 2022    Referring Physician: Tyler Peacock MD  Reason for Referral: CAD, ischemic cardiomyopathy     CC: \"Things seem better. \"     HPI:  The patient is 61 y.o. male with a past medical history significant for hypertension, hyperlipidemia, ischemic cardiomyopathy, and coronary artery disease who presents today for management of heart failure. He was initially seen for a pre-operative risk assessment prior to a colonoscopy scheduled 6/27/22. A [place] phone  was used during the visit today. He reported a history of a prior MI in 2020 while living in [facility] but states a coronary angiogram was not performed. He stated he would be treated with medical therapy. A prior stress test was completed 3/2021 showed a large fixed defect with an LVEF of 28%. He denied a prior echocardiogram. He reported a history of gastric ulcers and follows with GI. He completed an echocardiogram that showed a severely reduced LVEF at 25%. Today, he states overall he is doing well. He denies any chest pains or worsening SEGAL. He states he continues to remain active with working and exercising without any exertional symptoms. He feels his exercise tolerance has improved particularly since starting Entresto. He reports compliance with his medications and tolerating. He states the [facility] is expensive but is willing to continue for now. He denies any abnormal bleeding or bruising. Patient denies exertional chest pain/pressure, dyspnea at rest, worsening SEGAL, PND, orthopnea, palpitations, lightheadedness, weight changes, changes in LE edema, and syncope. Past Medical History:   Diagnosis Date    CAD (coronary artery disease)     Hyperlipidemia     Hypertension     Ischemic cardiomyopathy     MI (myocardial infarction) ([facility] [address]      History reviewed. No pertinent surgical history. History reviewed.  No pertinent family history. Social History     Tobacco Use    Smoking status: Never    Smokeless tobacco: Never   Vaping Use    Vaping Use: Never used   Substance Use Topics    Alcohol use: Never    Drug use: Never     Allergies   Allergen Reactions    Aspirin     Pcn [Penicillins]      Current Outpatient Medications   Medication Sig Dispense Refill    empagliflozin (JARDIANCE) 10 MG tablet Take 1 tablet by mouth in the morning. 90 tablet 3    furosemide (LASIX) 40 MG tablet Take 1 tablet by mouth in the morning. (Patient taking differently: Take 40 mg by mouth daily Patient taking 1 tablet pm) 60 tablet 3    sacubitril-valsartan (ENTRESTO) 24-26 MG per tablet Take 1 tablet by mouth in the morning and 1 tablet before bedtime. 60 tablet 3    pantoprazole (PROTONIX) 40 MG tablet Take 40 mg by mouth daily      spironolactone (ALDACTONE) 25 MG tablet Take 25 mg by mouth daily      carvedilol (COREG) 12.5 MG tablet Take 12.5 mg by mouth 2 times daily (with meals)      rosuvastatin (CRESTOR) 20 MG tablet Take 20 mg by mouth daily      famotidine (PEPCID) 20 MG tablet Take 1 tablet by mouth nightly as needed (cough and heart burn) 90 tablet 0    clopidogrel (PLAVIX) 75 MG tablet Take 75 mg by mouth daily       No current facility-administered medications for this visit. Review of Systems:  Constitutional: No unanticipated weight loss. There's been no change in energy level, sleep pattern, or activity level. No fevers, chills. Eyes: No visual changes or diplopia. No scleral icterus. ENT: No Headaches, hearing loss or vertigo. No mouth sores or sore throat. Cardiovascular: as reviewed in HPI  Respiratory: No cough or wheezing, no sputum production. No hemoptysis. Gastrointestinal: No abdominal pain, appetite loss, blood in stools. No change in bowel or bladder habits. Genitourinary: No dysuria, trouble voiding, or hematuria. Musculoskeletal:  No gait disturbance, no joint complaints.   Integumentary: No rash or pruritis. Neurological: No headache, diplopia, change in muscle strength, numbness or tingling. Psychiatric: No anxiety or depression. Endocrine: No temperature intolerance. No excessive thirst, fluid intake, or urination. No tremor. Hematologic/Lymphatic: No abnormal bruising or bleeding, blood clots or swollen lymph nodes. Allergic/Immunologic: No nasal congestion or hives. Physical Exam:   /70   Pulse 77   Ht 5' 5.5\" (1.664 m)   Wt 205 lb (93 kg)   SpO2 98%   BMI 33.59 kg/m²   Wt Readings from Last 3 Encounters:   08/31/22 205 lb (93 kg)   07/27/22 204 lb 6.4 oz (92.7 kg)   07/01/22 205 lb (93 kg)     Constitutional: He is oriented to person, place, and time. He appears well-developed and well-nourished. In no acute distress. Head: Normocephalic and atraumatic. Pupils equal and round. Neck: Neck supple. No JVP or carotid bruit appreciated. No mass and no thyromegaly present. No lymphadenopathy present. Cardiovascular: Normal rate. Normal heart sounds. Exam reveals no gallop and no friction rub. No murmur heard. Pulmonary/Chest: Effort normal and breath sounds normal. No respiratory distress. He has no wheezes, rhonchi or rales. Abdominal: Soft, non-tender. Bowel sounds are normal. He exhibits no organomegaly, mass or bruit. Extremities: No edema. No cyanosis or clubbing. Pulses are 2+ radial/carotid bilaterally. Neurological: No gross cranial nerve deficit. Coordination normal.   Skin: Skin is warm and dry. There is no rash or diaphoresis. Psychiatric: He has a normal mood and affect.  His speech is normal and behavior is normal.     Lab Review:   FLP:    Lab Results   Component Value Date/Time    TRIG 84 05/19/2022 10:55 AM    HDL 42 05/19/2022 10:55 AM    LDLCALC 142 05/19/2022 10:55 AM    LABVLDL 17 05/19/2022 10:55 AM     BUN/Creatinine:    Lab Results   Component Value Date/Time    BUN 17 05/19/2022 10:55 AM    CREATININE 1.2 05/19/2022 10:55 AM     EKG Interpretation: 6/8/22 Sinus rhythm. Possible left atrial enlargement. Extensive anterior-lateral and infero-posterior infarct. Nonspecific ST-T wave abnormalities. Image Review:     Stress test 3/2021   No stress induced ischemia   Large fixed severe perfusion defect involving the apical and mid portion of the walls   LVEF is 28%. Echo 7/20/22   The left ventricle appears dilated. Globally reduced left ventricular systolic function with an estimated ejection fraction of 20-25%. Grade II diastolic dysfunction with elevated LV filling pressures. Mild mitral regurgitation. The left atrium is moderately dilated. The right ventricle is normal in size and function. Mild to moderate tricuspid regurgitation. RVSP is 42 mmHg, this is consistent with mild-moderate pulmonary hypertension. Assessment/Plan:   1) CAD. Reports prior MI in 2020 with no coronary angiogram performed. Seemingly asymptomatic. Continue with medical management and risk factor modification including statin, Plavix, and B-blocker. Not on ASA with PUD. 2) Chronic systolic heart failure/Ischemic cardiomyopathy. EF 20-25%. Pt appears euvolemic today. Continue with medical therapy including Coreg 12.5mg BID, Jardiance 10 mg daily, and aldactone. Will increase Entresto to 49-51 mg BID and reduce Lasix 40 mg daily as needed. Encouraged low sodium diet and monitor weights daily. Repeat BMP today, and in 3-4 weeks. Will repeat echocardiogram after maximal medical therapy. Discussed ICD referral if EF does not improve with medical therapy. 3) Essential hypertension. Controlled. Goal BP <130/80. Continue medical therapy. 4) Hyperlipidemia. Continue high intensity statin with Crestor 20 mg daily. . Will increase to Crestor 40mg and consider addition of PCSK9i if not controlled. 5) History of gastric ulcer. Continue PPI and management per GI.      Follow up in 2-3 months after the echocardiogram     Thank you very much for allowing me to participate in the care of your patient. Please do not hesitate to contact me if you have any questions. Sincerely,  Martha Jha. Steven Goldsmith, [address] [place] [facility]  Ph: [phone]  Fax: [phone]    This note was scribed in the presence of Dr Steven Goldsmith MD by Marlin Rojas RN. Physician Attestation: The scribes documentation has been prepared under my direction and personally reviewed by me in its entirety. I confirm that the note above accurately reflects all work, treatment, procedures, and medical decision making performed by me. All portions of the note including but not limited to the chief complaint, history of present illness, physical exam, assessment and plan/medical decision making were personally reviewed, edited, and updated on the day of the visit.

## 2022-08-31 ENCOUNTER — OFFICE VISIT (OUTPATIENT)
Dept: CARDIOLOGY CLINIC | Age: 67
End: 2022-08-31
Payer: COMMERCIAL

## 2022-08-31 VITALS
HEART RATE: 77 BPM | OXYGEN SATURATION: 98 % | SYSTOLIC BLOOD PRESSURE: 112 MMHG | DIASTOLIC BLOOD PRESSURE: 70 MMHG | BODY MASS INDEX: 32.95 KG/M2 | WEIGHT: 205 LBS | HEIGHT: 66 IN

## 2022-08-31 DIAGNOSIS — I25.10 CORONARY ARTERY DISEASE INVOLVING NATIVE CORONARY ARTERY OF NATIVE HEART WITHOUT ANGINA PECTORIS: ICD-10-CM

## 2022-08-31 DIAGNOSIS — I25.5 ISCHEMIC CARDIOMYOPATHY: ICD-10-CM

## 2022-08-31 DIAGNOSIS — I50.22 CHRONIC LEFT VENTRICULAR SYSTOLIC HEART FAILURE (HCC): ICD-10-CM

## 2022-08-31 DIAGNOSIS — Z87.11 HISTORY OF GASTRIC ULCER: ICD-10-CM

## 2022-08-31 DIAGNOSIS — I10 ESSENTIAL HYPERTENSION: ICD-10-CM

## 2022-08-31 DIAGNOSIS — E78.2 MIXED HYPERLIPIDEMIA: ICD-10-CM

## 2022-08-31 DIAGNOSIS — I25.10 CORONARY ARTERY DISEASE INVOLVING NATIVE CORONARY ARTERY OF NATIVE HEART WITHOUT ANGINA PECTORIS: Primary | ICD-10-CM

## 2022-08-31 PROCEDURE — 99214 OFFICE O/P EST MOD 30 MIN: CPT | Performed by: INTERNAL MEDICINE

## 2022-08-31 PROCEDURE — 1036F TOBACCO NON-USER: CPT | Performed by: INTERNAL MEDICINE

## 2022-08-31 PROCEDURE — 3017F COLORECTAL CA SCREEN DOC REV: CPT | Performed by: INTERNAL MEDICINE

## 2022-08-31 PROCEDURE — G8427 DOCREV CUR MEDS BY ELIG CLIN: HCPCS | Performed by: INTERNAL MEDICINE

## 2022-08-31 PROCEDURE — G8417 CALC BMI ABV UP PARAM F/U: HCPCS | Performed by: INTERNAL MEDICINE

## 2022-08-31 RX ORDER — FUROSEMIDE 40 MG/1
40 TABLET ORAL PRN
Qty: 30 TABLET | Refills: 3 | Status: SHIPPED | OUTPATIENT
Start: 2022-08-31 | End: 2022-08-31

## 2022-08-31 RX ORDER — ROSUVASTATIN CALCIUM 40 MG/1
40 TABLET, COATED ORAL DAILY
Qty: 90 TABLET | Refills: 3 | Status: SHIPPED | OUTPATIENT
Start: 2022-08-31

## 2022-08-31 RX ORDER — FUROSEMIDE 40 MG/1
TABLET ORAL
Qty: 90 TABLET | Refills: 2 | Status: SHIPPED | OUTPATIENT
Start: 2022-08-31

## 2022-08-31 RX ORDER — CLOPIDOGREL BISULFATE 75 MG/1
75 TABLET ORAL DAILY
Qty: 90 TABLET | Refills: 3 | Status: SHIPPED | OUTPATIENT
Start: 2022-08-31

## 2022-08-31 NOTE — PATIENT INSTRUCTIONS
Increase Entresto to 49-51 mg twice a day  Reduce Lasix to 40 mg as needed for weight gain or swelling  Increase Crestor to 40 mg daily    Lab work today   Lab work in 1 month  Ultrasound in October and then follow up in office afterwards

## 2022-09-01 DIAGNOSIS — I50.22 CHRONIC LEFT VENTRICULAR SYSTOLIC HEART FAILURE (HCC): Primary | ICD-10-CM

## 2022-09-01 LAB
ANION GAP SERPL CALCULATED.3IONS-SCNC: 15 MMOL/L (ref 3–16)
BUN BLDV-MCNC: 18 MG/DL (ref 7–20)
CALCIUM SERPL-MCNC: 10.1 MG/DL (ref 8.3–10.6)
CHLORIDE BLD-SCNC: 103 MMOL/L (ref 99–110)
CO2: 23 MMOL/L (ref 21–32)
CREAT SERPL-MCNC: 1.7 MG/DL (ref 0.8–1.3)
GFR AFRICAN AMERICAN: 49
GFR NON-AFRICAN AMERICAN: 41
GLUCOSE BLD-MCNC: 104 MG/DL (ref 70–99)
POTASSIUM SERPL-SCNC: 4.8 MMOL/L (ref 3.5–5.1)
SODIUM BLD-SCNC: 141 MMOL/L (ref 136–145)

## 2022-09-20 DIAGNOSIS — I50.22 CHRONIC LEFT VENTRICULAR SYSTOLIC HEART FAILURE (HCC): ICD-10-CM

## 2022-09-20 LAB
ANION GAP SERPL CALCULATED.3IONS-SCNC: 14 MMOL/L (ref 3–16)
BUN BLDV-MCNC: 17 MG/DL (ref 7–20)
CALCIUM SERPL-MCNC: 10 MG/DL (ref 8.3–10.6)
CHLORIDE BLD-SCNC: 102 MMOL/L (ref 99–110)
CO2: 22 MMOL/L (ref 21–32)
CREAT SERPL-MCNC: 1.1 MG/DL (ref 0.8–1.3)
GFR AFRICAN AMERICAN: >60
GFR NON-AFRICAN AMERICAN: >60
GLUCOSE BLD-MCNC: 104 MG/DL (ref 70–99)
POTASSIUM SERPL-SCNC: 4.4 MMOL/L (ref 3.5–5.1)
SODIUM BLD-SCNC: 138 MMOL/L (ref 136–145)

## 2022-10-21 ENCOUNTER — HOSPITAL ENCOUNTER (OUTPATIENT)
Dept: CARDIOLOGY | Age: 67
Discharge: HOME | End: 2022-10-21
Payer: COMMERCIAL

## 2022-10-21 DIAGNOSIS — I25.5 ISCHEMIC CARDIOMYOPATHY: ICD-10-CM

## 2022-10-21 DIAGNOSIS — I50.22 CHRONIC LEFT VENTRICULAR SYSTOLIC HEART FAILURE (HCC): ICD-10-CM

## 2022-10-21 PROCEDURE — 93306 TTE W/DOPPLER COMPLETE: CPT

## 2022-11-01 ENCOUNTER — OFFICE VISIT (OUTPATIENT)
Dept: INTERNAL MEDICINE CLINIC | Age: 67
End: 2022-11-01
Payer: COMMERCIAL

## 2022-11-01 VITALS
WEIGHT: 207.4 LBS | SYSTOLIC BLOOD PRESSURE: 128 MMHG | OXYGEN SATURATION: 96 % | BODY MASS INDEX: 33.99 KG/M2 | HEART RATE: 71 BPM | DIASTOLIC BLOOD PRESSURE: 84 MMHG

## 2022-11-01 DIAGNOSIS — Z11.59 NEED FOR HEPATITIS C SCREENING TEST: ICD-10-CM

## 2022-11-01 DIAGNOSIS — Z13.1 SCREENING FOR DIABETES MELLITUS: ICD-10-CM

## 2022-11-01 DIAGNOSIS — I10 PRIMARY HYPERTENSION: ICD-10-CM

## 2022-11-01 DIAGNOSIS — J02.9 PHARYNGITIS, UNSPECIFIED ETIOLOGY: ICD-10-CM

## 2022-11-01 DIAGNOSIS — Z23 NEED FOR SHINGLES VACCINE: ICD-10-CM

## 2022-11-01 DIAGNOSIS — Z00.00 ENCOUNTER FOR WELL ADULT EXAM WITHOUT ABNORMAL FINDINGS: Primary | ICD-10-CM

## 2022-11-01 DIAGNOSIS — Z23 NEED FOR PNEUMOCOCCAL VACCINE: ICD-10-CM

## 2022-11-01 LAB
ANION GAP SERPL CALCULATED.3IONS-SCNC: 13 MMOL/L (ref 3–16)
BUN BLDV-MCNC: 20 MG/DL (ref 7–20)
CALCIUM SERPL-MCNC: 10 MG/DL (ref 8.3–10.6)
CHLORIDE BLD-SCNC: 102 MMOL/L (ref 99–110)
CO2: 22 MMOL/L (ref 21–32)
CREAT SERPL-MCNC: 1.4 MG/DL (ref 0.8–1.3)
GFR SERPL CREATININE-BSD FRML MDRD: 56 ML/MIN/{1.73_M2}
GLUCOSE BLD-MCNC: 101 MG/DL (ref 70–99)
HCT VFR BLD CALC: 47.7 % (ref 40.5–52.5)
HEMOGLOBIN: 16.2 G/DL (ref 13.5–17.5)
HEPATITIS C ANTIBODY INTERPRETATION: NORMAL
MCH RBC QN AUTO: 30.7 PG (ref 26–34)
MCHC RBC AUTO-ENTMCNC: 34 G/DL (ref 31–36)
MCV RBC AUTO: 90.2 FL (ref 80–100)
PDW BLD-RTO: 14.5 % (ref 12.4–15.4)
PLATELET # BLD: 206 K/UL (ref 135–450)
PMV BLD AUTO: 9.2 FL (ref 5–10.5)
POTASSIUM SERPL-SCNC: 4.7 MMOL/L (ref 3.5–5.1)
RBC # BLD: 5.28 M/UL (ref 4.2–5.9)
SODIUM BLD-SCNC: 137 MMOL/L (ref 136–145)
WBC # BLD: 8.7 K/UL (ref 4–11)

## 2022-11-01 PROCEDURE — G8484 FLU IMMUNIZE NO ADMIN: HCPCS | Performed by: INTERNAL MEDICINE

## 2022-11-01 PROCEDURE — 90677 PCV20 VACCINE IM: CPT | Performed by: INTERNAL MEDICINE

## 2022-11-01 PROCEDURE — 3074F SYST BP LT 130 MM HG: CPT | Performed by: INTERNAL MEDICINE

## 2022-11-01 PROCEDURE — 90471 IMMUNIZATION ADMIN: CPT | Performed by: INTERNAL MEDICINE

## 2022-11-01 PROCEDURE — 99396 PREV VISIT EST AGE 40-64: CPT | Performed by: INTERNAL MEDICINE

## 2022-11-01 PROCEDURE — 3078F DIAST BP <80 MM HG: CPT | Performed by: INTERNAL MEDICINE

## 2022-11-01 PROCEDURE — 90750 HZV VACC RECOMBINANT IM: CPT | Performed by: INTERNAL MEDICINE

## 2022-11-01 PROCEDURE — 90472 IMMUNIZATION ADMIN EACH ADD: CPT | Performed by: INTERNAL MEDICINE

## 2022-11-01 RX ORDER — AZITHROMYCIN 250 MG/1
250 TABLET, FILM COATED ORAL SEE ADMIN INSTRUCTIONS
Qty: 6 TABLET | Refills: 0 | Status: SHIPPED | OUTPATIENT
Start: 2022-11-01 | End: 2022-11-06

## 2022-11-01 ASSESSMENT — ENCOUNTER SYMPTOMS
VOMITING: 0
SORE THROAT: 1
WHEEZING: 0
TROUBLE SWALLOWING: 0
SHORTNESS OF BREATH: 0
ABDOMINAL PAIN: 0
NAUSEA: 0
PHOTOPHOBIA: 0
VOICE CHANGE: 1

## 2022-11-01 NOTE — PATIENT INSTRUCTIONS
Well Visit, Ages 25 to 72: Care Instructions  Well visits can help you stay healthy. Your doctor has checked your overall health and may have suggested ways to take good care of yourself. Your doctor also may have recommended tests. You can help prevent illness with healthy eating, good sleep, vaccinations, regular exercise, and other steps. Get the tests that you and your doctor decide on. Depending on your age and risks, examples might include screening for diabetes; hepatitis C; HIV; and cervical, breast, lung, and colon cancer. Screening helps find diseases before any symptoms appear. Eat healthy foods. Choose fruits, vegetables, whole grains, lean protein, and low-fat dairy foods. Limit saturated fat and reduce salt. Limit alcohol. Men should have no more than 2 drinks a day. Women should have no more than 1. For some people, no alcohol is the best choice. Exercise. Get at least 30 minutes of exercise on most days of the week. Walking can be a good choice. Reach and stay at your healthy weight. This will lower your risk for many health problems. Take care of your mental health. Try to stay connected with friends, family, and community, and find ways to manage stress. If you're feeling depressed or hopeless, talk to someone. A counselor can help. If you don't have a counselor, talk to your doctor. Talk to your doctor if you think you may have a problem with alcohol or drug use. This includes prescription medicines and illegal drugs. Avoid tobacco and nicotine: Don't smoke, vape, or chew. If you need help quitting, talk to your doctor. Practice safer sex. Getting tested, using condoms or dental dams, and limiting sex partners can help prevent STIs. Use birth control if it's important to you to prevent pregnancy. Talk with your doctor about your choices and what might be best for you. Prevent problems where you can.  Protect your skin from too much sun, wash your hands, brush your teeth twice a day, and wear a seat belt in the car. Where can you learn more? Go to https://chpepiceweb.[employer]. org and sign in to your [employer] account. Enter P072 in the [facility] box to learn more about \"Well Visit, Ages 25 to 72: Care Instructions. \"     If you do not have an account, please click on the \"Sign Up Now\" link. Current as of: March 9, 2022               Content Version: 13.4  © 1996-6183 [employer]wise, Incorporated. Care instructions adapted under license by [facility] ([facility]). If you have questions about a medical condition or this instruction, always ask your healthcare professional. [address] any warranty or liability for your use of this information.

## 2022-11-01 NOTE — PROGRESS NOTES
Well Adult Note  Name: Jorge Tran Date: 2022   MRN: [id number] Sex: Male   Age: 61 y.o. Ethnicity:  / Kasie Abrams   : 1955 Race:  / Gaye Clemencia is here for well adult exam.  History:  Patient wants to have yearly physical.  History of ischemic cardiomyopathy. Despite multiple medication his ejection fraction continues to be around 20 to 25%. He has a follow-up appointment with cardiologist tomorrow    He is complaining of sore throat slight hoarse voice since yesterday. Denies any fever chills nausea vomiting systemic symptoms. Review of Systems   Constitutional:  Negative for diaphoresis and unexpected weight change. HENT:  Positive for congestion, sore throat and voice change. Negative for trouble swallowing. Eyes:  Negative for photophobia and visual disturbance. Respiratory:  Negative for shortness of breath and wheezing. Cardiovascular:  Negative for chest pain and palpitations. Gastrointestinal:  Negative for abdominal pain, nausea and vomiting. Neurological:  Negative for dizziness and light-headedness. Allergies   Allergen Reactions    Aspirin     Pcn [Penicillins]          Prior to Visit Medications    Medication Sig Taking?  Authorizing Provider   azithromycin (ZITHROMAX) 250 MG tablet Take 1 tablet by mouth See Admin Instructions for 5 days 500mg on day 1 followed by 250mg on days 2 - 5 Yes ABA Herzog MD   sacubitril-valsartan (ENTRESTO) 24-26 MG per tablet Take 1 tablet by mouth 2 times daily  Patient taking differently: Take 1 tablet by mouth 2 times daily 49-51 mg Yes Orin Hamilton MD   clopidogrel (PLAVIX) 75 MG tablet Take 1 tablet by mouth daily Yes Orin Hamilton MD   empagliflozin (JARDIANCE) 10 MG tablet Take 1 tablet by mouth daily Yes Orin Hamilton MD   rosuvastatin (CRESTOR) 40 MG tablet Take 1 tablet by mouth daily Yes Orin Hamilton MD   furosemide (LASIX) 40 MG tablet TAKE 1 TABLET BY MOUTH DAILY AS NEEDED FOR WEIGHT GAIN OR SHORTNESS OF BREATH OR SWELLING Yes Ellie Juan MD   pantoprazole (PROTONIX) 40 MG tablet Take 40 mg by mouth daily Yes Historical Provider, MD   spironolactone (ALDACTONE) 25 MG tablet Take 25 mg by mouth daily Yes Historical Provider, MD   carvedilol (COREG) 12.5 MG tablet Take 12.5 mg by mouth 2 times daily (with meals) Yes Historical Provider, MD   famotidine (PEPCID) 20 MG tablet Take 1 tablet by mouth nightly as needed (cough and heart burn) Yes Mu Diaz MD         Past Medical History:   Diagnosis Date    CAD (coronary artery disease)     Hyperlipidemia     Hypertension     Ischemic cardiomyopathy     MI (myocardial infarction) ([facility] [address]        History reviewed. No pertinent surgical history. History reviewed. No pertinent family history. Social History     Tobacco Use    Smoking status: Never    Smokeless tobacco: Never   Vaping Use    Vaping Use: Never used   Substance Use Topics    Alcohol use: Never    Drug use: Never       Objective   /84 (Site: Left Upper Arm)   Pulse 71   Wt 207 lb 6.4 oz (94.1 kg)   SpO2 96%   BMI 33.99 kg/m²   Wt Readings from Last 3 Encounters:   11/01/22 207 lb 6.4 oz (94.1 kg)   08/31/22 205 lb (93 kg)   07/27/22 204 lb 6.4 oz (92.7 kg)     There were no vitals filed for this visit. Physical Exam  Vitals and nursing note reviewed. Constitutional:       Appearance: He is not ill-appearing. HENT:      Mouth/Throat:      Comments: Pharyngeal erythema without exudate. Eyes:      Conjunctiva/sclera: Conjunctivae normal.   Cardiovascular:      Rate and Rhythm: Normal rate and regular rhythm. Heart sounds: Murmur heard. Pulmonary:      Effort: Pulmonary effort is normal.      Breath sounds: Normal breath sounds. Abdominal:      General: Bowel sounds are normal.   Musculoskeletal:      Right lower leg: No edema. Left lower leg: No edema. Neurological:      General: No focal deficit present.       Mental Status: He is alert and oriented to person, place, and time. Assessment   Plan   Assessment/Plan:  Get Clement was seen today for annual exam, pharyngitis, hypertension and hyperlipidemia. Diagnoses and all orders for this visit:    Encounter for well adult exam without abnormal findings  Annual PE/Wellness exam:  Discussed age appropriate preventive care including healthy diet, daily exercise, immunizations and age & gender guided screening tests. Primary hypertension  -     CBC; Future  -     Basic Metabolic Panel; Future    Screening for diabetes mellitus  -     Hemoglobin A1C; Future    Pharyngitis, unspecified etiology  Warm salt water gargles several times daily.  -     CBC; Future  -     COVID-19; Future  -     azithromycin (ZITHROMAX) 250 MG tablet; Take 1 tablet by mouth See Admin Instructions for 5 days 500mg on day 1 followed by 250mg on days 2 - 5    Need for hepatitis C screening test  -     Hepatitis C Antibody;  Future    Need for pneumococcal vaccine  -     Pneumococcal, PCV20, PREVNAR 20, (age 25 yrs+), IM, PF    Need for shingles vaccine  -     Zoster, SHINGRIX, (50 yrs +), IM      Personalized Preventive Plan   Current Health Maintenance Status  Immunization History   Administered Date(s) Administered    COVID-19, MODERNA BLUE border, Primary or Immunocompromised, (age 12y+), IM, 100 mcg/0.5mL 03/28/2021, 04/25/2021    COVID-19, MODERNA Booster BLUE border, (age 18y+), IM, 50mcg/0.25mL 11/05/2021    Influenza Virus Vaccine 10/03/2022    Pneumococcal conjugate PCV20, PF (Prevnar 20) 11/01/2022    Zoster Recombinant (Shingrix) 11/01/2022        Health Maintenance   Topic Date Due    HIV screen  Never done    Hepatitis C screen  Never done    DTaP/Tdap/Td vaccine (1 - Tdap) Never done    Diabetes screen  Never done    COVID-19 Vaccine (4 - Booster for Moderna series) 12/31/2021    Shingles vaccine (2 of 2) 12/27/2022    Lipids  05/19/2023    Depression Screen  05/19/2023    Colorectal Cancer Screen  06/27/2029    Flu vaccine  Completed    Pneumococcal 0-64 years Vaccine  Completed    Hepatitis A vaccine  Aged Out    Hib vaccine  Aged Out    Meningococcal (ACWY) vaccine  Aged Out     Recommendations for [place] Due: see orders and patient instructions/AVS.    Return in about 3 months (around 2/1/2023) for second shingles. An After Visit Summary was printed and given to the patient. Documentation was done using voice recognition dragon software. Every effort was made to ensure accuracy; however, inadvertent  Unintentional computerized transcription errors may be present.

## 2022-11-02 LAB
ESTIMATED AVERAGE GLUCOSE: 119.8 MG/DL
HBA1C MFR BLD: 5.8 %
SARS-COV-2: NOT DETECTED

## 2022-11-07 NOTE — PROGRESS NOTES
Cardiac Electrophysiology Consultation   Date: 11/9/2022   Reason for Consultation: Consideration of ICD  Consult Requesting Physician: Ham Rodríguez MD  Primary Care Physician: Colonel Mahamed MD     Chief Complaint:   Chief Complaint   Patient presents with    Follow-up     Coronary artery disease involving native coronary artery of native heart without angina pectoris. HPI: Ricki Gonzalez is a 61 y.o. patient with a history of gastric ulcers hypertension, hyperlipidemia, ischemic cardiomyopathy, coronary artery disease and reported a history of a prior MI in 2020 while living in [facility] . Stress test 3/2021 showed a large fixed defect with an LVEF of 28%. Echo on 7/25/2022 showed LVEF 20-25 % and repeat Echo after optimized guideline directed medical therapy 10/21/2022 showed LVEF remained depressed at 20- 25 %     Today, he presents to office accompanied by his wife  referred by his primary cardiologist Ham Rodríguez MD for consideration of ICD. He is compliant with his medications and tolerating them well. He denies chest pain/pressure, tightness, edema, shortness of breath, heart racing, palpitations, lightheadedness, dizziness, syncope, presyncope,  PND or orthopnea. Past Medical History:   Diagnosis Date    CAD (coronary artery disease)     Hyperlipidemia     Hypertension     Ischemic cardiomyopathy     MI (myocardial infarction) ([address]       No past surgical history on file. Allergies: Allergies   Allergen Reactions    Aspirin     Pcn [Penicillins]      Medication:   Prior to Admission medications    Medication Sig Start Date End Date Taking?  Authorizing Provider   sacubitril-valsartan (ENTRESTO) 24-26 MG per tablet Take 1 tablet by mouth 2 times daily  Patient taking differently: Take 1 tablet by mouth 2 times daily 49-51 mg 9/1/22  Yes Ham Rodríguez MD   clopidogrel (PLAVIX) 75 MG tablet Take 1 tablet by mouth daily 8/31/22  Yes Ham Rodríguez MD   empagliflozin ([place] Check) 10 MG tablet Take 1 tablet by mouth daily 8/31/22  Yes Kathy Handley MD   rosuvastatin (CRESTOR) 40 MG tablet Take 1 tablet by mouth daily 8/31/22  Yes Kathy Handley MD   furosemide (LASIX) 40 MG tablet TAKE 1 TABLET BY MOUTH DAILY AS NEEDED FOR WEIGHT GAIN OR SHORTNESS OF BREATH OR SWELLING 8/31/22  Yes Kathy Handley MD   pantoprazole (PROTONIX) 40 MG tablet Take 40 mg by mouth daily   Yes Historical Provider, MD   spironolactone (ALDACTONE) 25 MG tablet Take 25 mg by mouth daily   Yes Historical Provider, MD   carvedilol (COREG) 12.5 MG tablet Take 12.5 mg by mouth 2 times daily (with meals)   Yes Historical Provider, MD   famotidine (PEPCID) 20 MG tablet Take 1 tablet by mouth nightly as needed (cough and heart burn) 5/19/22  Yes Francie Geller MD     Social History:   reports that he has never smoked. He has never used smokeless tobacco. He reports that he does not drink alcohol and does not use drugs. Family History:  family history is not on file. Reviewed. Denies family history of sudden cardiac death, arrhythmia, premature CAD    Review of System:  Pertinent positive and negatives are in the HPI, the rest are negative. Physical Examination:  /80 (Site: Right Upper Arm, Position: Sitting)   Pulse 75   Ht 5' 5.5\" (1.664 m)   Wt 207 lb (93.9 kg)   SpO2 95%   BMI 33.92 kg/m²      Constitutional: Oriented. No distress. Head: Normocephalic and atraumatic. Mouth/Throat: Oropharynx is clear and moist.   Eyes: Conjunctivae normal. EOM are normal.   Neck: Normal range of motion. Neck supple. No rigidity. No JVD present. Cardiovascular: Normal rate, regular rhythm, S1&S2 and intact distal pulses. Pulmonary/Chest: Bilateral respiratory sounds. No wheezes. No rhonchi. Abdominal: Soft. Bowel sounds present. No distension, No tenderness. Musculoskeletal: No tenderness. No edema    Lymphadenopathy: Has no cervical adenopathy. Neurological: Alert and oriented.  Cranial nerve appears intact, No Gross deficit   Skin: Skin is warm and dry. No rash noted. Psychiatric: Has a normal mood, affect and behavior     Labs:  Reviewed. ECG: reviewed, normal sinus rhythm with v-rate of 71 bpm with QRS duration 103 ms, old anterior infarct. No ventricular pre-excitation, or QT prolongation. Studies:   1. Event monitor: n/a    2. Echo: 7/20/22   The left ventricle appears dilated. Globally reduced left ventricular systolic function with an estimated ejection fraction of 20-25%. Grade II diastolic dysfunction with elevated LV filling pressures. Mild mitral regurgitation. The left atrium is moderately dilated. The right ventricle is normal in size and function. Mild to moderate tricuspid regurgitation. RVSP is 42 mmHg, this is consistent with mild-moderate pulmonary hypertension. 3. Stress Test:  Stress test 3/2021   No stress induced ischemia   Large fixed severe perfusion defect involving the apical and mid portion of the walls   LVEF is 28%. 4. Cath: n/a    I independently reviewed the ECG, MCOT, echocardiogram, stress test, and coronary angiography/PCI results and used them for my plan of care. Procedures:  1. N/a    Assessment/Plan:     Chronic systolic heart failure  Ischemic cardiomyopathy. - EF 20-25 % per Echo 7/20/2022 repeat Echo 10/21/2022 LVEF remained   - Continue optimized guideline directed medical therapy, which patient has been on for greater than three months. Beta Blocker: Carvedilol 12.5 mg BID                Aldosterone Antagonist: Spironolactone 25 mg daily. Diuretic: Furosemide 40 mg daily. ACE/ ARNI/ARB: Entersto 49/51mg BID. SGLT2 Inhibitor: Jardiance 10 mg daily.   - Euvolemic on today's exam.    Mr. Brenda Harris has class I indication for ICD for primary prevention given LVEF <35%, on GDMT >3 months, FC II-III. He has a normal Qrs duration.  We discussed in great detail using the [place] decision aid tool indication for ICD and gave information packet. The procedure was also discussed in great detail including but not limited to bleeding, cardiac and lung perforation, kidney dysfunction and fatal arrhythmia. The patient opts to proceed with the procedure. Much time was spent with the patient discussing the pathophysiology of the patient's cardiomyopathy, the high risk of sudden cardiac death due to the cardiomyopathy, the utility of a implantable cardioverter defibrillator (ICD), the benefit and risks of the implantation of an ICD, the benefits and risks of living with an ICD, the lifestyle changes that come along with having an ICD, and the logistics of ICD care and generator changes that go along with having an ICD. All of the topics above were covered with the patient. The patient wishes to proceed with with a Medtronic dual chamber ICD implantation for primary prevention of sudden cardiac arrest.     Coronary artery disease  - Continue with medical management and risk factor modification including plavix, statin, and beta blocker. - Not on ASA due to history of PUD. Essential hypertension   - Stable. - Continue current medical management. Follow ups: Follow up 1 week after ICD implant in the device clinic for site check and device interrogation. Then  follow up 3 months after ICD implantation with .[facility]  if he decides to proceed. If he does not proceed with ICD implantation, he will follow-up PRN. Continue routine follow up with Ru Conway MD.    Thank you for allowing me to participate in the care of [facility]. All questions and concerns were addressed to the patient/family. Alternatives to my treatment were discussed. Physician attestation: The scribe's documentation has been prepared under my direction and has been personally reviewed by me in its entirety. I confirm that the note above reflects all work, treatment, procedures, and medical decision making performed by me. Rody Catherine MD  Cardiac Electrophysiology  [facility]

## 2022-11-09 ENCOUNTER — OFFICE VISIT (OUTPATIENT)
Dept: CARDIOLOGY CLINIC | Age: 67
End: 2022-11-09
Payer: COMMERCIAL

## 2022-11-09 VITALS
HEART RATE: 75 BPM | HEIGHT: 66 IN | SYSTOLIC BLOOD PRESSURE: 108 MMHG | OXYGEN SATURATION: 95 % | WEIGHT: 207 LBS | DIASTOLIC BLOOD PRESSURE: 80 MMHG | BODY MASS INDEX: 33.27 KG/M2

## 2022-11-09 DIAGNOSIS — I25.10 CORONARY ARTERY DISEASE INVOLVING NATIVE CORONARY ARTERY OF NATIVE HEART WITHOUT ANGINA PECTORIS: ICD-10-CM

## 2022-11-09 DIAGNOSIS — I25.5 ISCHEMIC CARDIOMYOPATHY: ICD-10-CM

## 2022-11-09 DIAGNOSIS — I50.22 CHRONIC LEFT VENTRICULAR SYSTOLIC HEART FAILURE (HCC): Primary | ICD-10-CM

## 2022-11-09 PROCEDURE — 3074F SYST BP LT 130 MM HG: CPT | Performed by: INTERNAL MEDICINE

## 2022-11-09 PROCEDURE — G8417 CALC BMI ABV UP PARAM F/U: HCPCS | Performed by: INTERNAL MEDICINE

## 2022-11-09 PROCEDURE — 1036F TOBACCO NON-USER: CPT | Performed by: INTERNAL MEDICINE

## 2022-11-09 PROCEDURE — 99205 OFFICE O/P NEW HI 60 MIN: CPT | Performed by: INTERNAL MEDICINE

## 2022-11-09 PROCEDURE — 3017F COLORECTAL CA SCREEN DOC REV: CPT | Performed by: INTERNAL MEDICINE

## 2022-11-09 PROCEDURE — G8484 FLU IMMUNIZE NO ADMIN: HCPCS | Performed by: INTERNAL MEDICINE

## 2022-11-09 PROCEDURE — 93000 ELECTROCARDIOGRAM COMPLETE: CPT | Performed by: INTERNAL MEDICINE

## 2022-11-09 PROCEDURE — G8427 DOCREV CUR MEDS BY ELIG CLIN: HCPCS | Performed by: INTERNAL MEDICINE

## 2022-11-09 PROCEDURE — 3078F DIAST BP <80 MM HG: CPT | Performed by: INTERNAL MEDICINE

## 2022-11-09 NOTE — PATIENT INSTRUCTIONS
Si tiene alguna pregunta con respecto a la implantación de boogie ICD, comuníquese con Dr. Bogdan Hughes at [phone]. Implantación del BALWINDER Instrucciones previas al procedimiento     Fecha: 11-    Llegar a: 11:30 am    Tiempo de procedimiento: 1:00 pm    La mañana de boogie procedimiento se estacionará en el estacionamiento del hospital y se presentará directamente en el laboratorio de cateterismo para registrarse. En el mostrador de [place], manténgase a la derecha y siga hasta el final del pasillo, esto lo llevará directamente a boogie mostrador de registro para el laboratorio de cateterismo. Pre-Procedure Instructions   Deberá ayunar (no comer ni beber nada) después de la medianoche del día de boogie procedimiento. NO mastique chicle ni coma mentas el día de boogie procedimiento  Deberá mantener boogie Plavix kayli 3 días antes del procedimiento. Puede suspender la furosemida la mañana del procedimiento para sentirse cómodo y disminuir la [place] urinaria. No use lociones, cremas o perfumes la mañana del procedimiento. Deberá completar el trabajo de laboratorio previo al procedimiento de 5 a 7 días antes del procedimiento. Pida a un adulto responsable que lo lleve a boogie casa después del procedimiento, debe irse a casa el mismo día, sue siempre existe la posibilidad de pasar la noche. El laboratorio de cateterismo le proporcionará todas las instrucciones posteriores al procedimiento. [address] [facility]  [address] [address]  Phone: [phone]  The hours are Mon -Fri.   6:30 am - 4:00 pm   Saturday 8:00 am - noon  No appointment necessary

## 2022-11-09 NOTE — LETTER
[facility]  Phone: [phone]  Fax: [phone]    Hawk Bose MD        November 9, 2022     Patient: Cisco Reyes   YOB: 1955   Date of Visit: 11/9/2022       To Whom It May Concern:    Cisco Reyes was seen in office today and may return to work tomorrow 11/10/2022 with out restrictions or limitation from a cardiac standpoint. If you have any questions or concerns, please don't hesitate to call.     Sincerely,        Hawk Bose MD

## 2022-11-12 DIAGNOSIS — I25.10 CORONARY ARTERY DISEASE INVOLVING NATIVE CORONARY ARTERY OF NATIVE HEART WITHOUT ANGINA PECTORIS: ICD-10-CM

## 2022-11-12 DIAGNOSIS — I50.22 CHRONIC LEFT VENTRICULAR SYSTOLIC HEART FAILURE (HCC): ICD-10-CM

## 2022-11-12 DIAGNOSIS — I25.5 ISCHEMIC CARDIOMYOPATHY: ICD-10-CM

## 2022-11-12 LAB
ANION GAP SERPL CALCULATED.3IONS-SCNC: 13 MMOL/L (ref 3–16)
BUN BLDV-MCNC: 20 MG/DL (ref 7–20)
CALCIUM SERPL-MCNC: 9.8 MG/DL (ref 8.3–10.6)
CHLORIDE BLD-SCNC: 104 MMOL/L (ref 99–110)
CO2: 25 MMOL/L (ref 21–32)
CREAT SERPL-MCNC: 1.3 MG/DL (ref 0.8–1.3)
GFR SERPL CREATININE-BSD FRML MDRD: >60 ML/MIN/{1.73_M2}
GLUCOSE BLD-MCNC: 104 MG/DL (ref 70–99)
HCT VFR BLD CALC: 50.2 % (ref 40.5–52.5)
HEMOGLOBIN: 16.7 G/DL (ref 13.5–17.5)
MCH RBC QN AUTO: 30.7 PG (ref 26–34)
MCHC RBC AUTO-ENTMCNC: 33.3 G/DL (ref 31–36)
MCV RBC AUTO: 92.4 FL (ref 80–100)
PDW BLD-RTO: 14.3 % (ref 12.4–15.4)
PLATELET # BLD: 219 K/UL (ref 135–450)
PMV BLD AUTO: 8.7 FL (ref 5–10.5)
POTASSIUM SERPL-SCNC: 5 MMOL/L (ref 3.5–5.1)
RBC # BLD: 5.43 M/UL (ref 4.2–5.9)
SODIUM BLD-SCNC: 142 MMOL/L (ref 136–145)
WBC # BLD: 9.3 K/UL (ref 4–11)

## 2022-11-21 ENCOUNTER — HOSPITAL ENCOUNTER (OUTPATIENT)
Dept: CARDIAC CATH/INVASIVE PROCEDURES | Age: 67
Discharge: HOME | End: 2022-11-21
Payer: COMMERCIAL

## 2022-11-21 ENCOUNTER — NURSE ONLY (OUTPATIENT)
Dept: CARDIOLOGY CLINIC | Age: 67
End: 2022-11-21

## 2022-11-21 ENCOUNTER — HOSPITAL ENCOUNTER (OUTPATIENT)
Dept: GENERAL RADIOLOGY | Age: 67
Discharge: HOME | End: 2022-11-21
Payer: COMMERCIAL

## 2022-11-21 VITALS
SYSTOLIC BLOOD PRESSURE: 116 MMHG | DIASTOLIC BLOOD PRESSURE: 77 MMHG | RESPIRATION RATE: 20 BRPM | HEART RATE: 70 BPM | BODY MASS INDEX: 33.27 KG/M2 | WEIGHT: 207 LBS | HEIGHT: 66 IN | OXYGEN SATURATION: 97 % | TEMPERATURE: 96.6 F

## 2022-11-21 DIAGNOSIS — Z95.810 CARDIAC DEFIBRILLATOR IN SITU: Primary | ICD-10-CM

## 2022-11-21 DIAGNOSIS — I44.30 AV HEART BLOCK: ICD-10-CM

## 2022-11-21 DIAGNOSIS — I49.5 SINUS NODE DYSFUNCTION (HCC): ICD-10-CM

## 2022-11-21 DIAGNOSIS — R00.1 SYMPTOMATIC BRADYCARDIA: ICD-10-CM

## 2022-11-21 DIAGNOSIS — I25.5 ISCHEMIC CARDIOMYOPATHY: ICD-10-CM

## 2022-11-21 PROCEDURE — 99153 MOD SED SAME PHYS/QHP EA: CPT

## 2022-11-21 PROCEDURE — C1898 LEAD, PMKR, OTHER THAN TRANS: HCPCS

## 2022-11-21 PROCEDURE — A4216 STERILE WATER/SALINE, 10 ML: HCPCS

## 2022-11-21 PROCEDURE — 33249 INSJ/RPLCMT DEFIB W/LEAD(S): CPT | Performed by: INTERNAL MEDICINE

## 2022-11-21 PROCEDURE — C1777 LEAD, AICD, ENDO SINGLE COIL: HCPCS

## 2022-11-21 PROCEDURE — C1721 AICD, DUAL CHAMBER: HCPCS

## 2022-11-21 PROCEDURE — 2580000003 HC RX 258

## 2022-11-21 PROCEDURE — C1894 INTRO/SHEATH, NON-LASER: HCPCS

## 2022-11-21 PROCEDURE — 99152 MOD SED SAME PHYS/QHP 5/>YRS: CPT

## 2022-11-21 PROCEDURE — C1892 INTRO/SHEATH,FIXED,PEEL-AWAY: HCPCS

## 2022-11-21 PROCEDURE — 2709999900 HC NON-CHARGEABLE SUPPLY

## 2022-11-21 PROCEDURE — 71046 X-RAY EXAM CHEST 2 VIEWS: CPT

## 2022-11-21 PROCEDURE — 2500000003 HC RX 250 WO HCPCS

## 2022-11-21 PROCEDURE — 6360000002 HC RX W HCPCS

## 2022-11-21 PROCEDURE — 33249 INSJ/RPLCMT DEFIB W/LEAD(S): CPT

## 2022-11-21 PROCEDURE — 93005 ELECTROCARDIOGRAM TRACING: CPT | Performed by: INTERNAL MEDICINE

## 2022-11-21 RX ORDER — SODIUM CHLORIDE 0.9 % (FLUSH) 0.9 %
5-40 SYRINGE (ML) INJECTION PRN
Status: DISCONTINUED | OUTPATIENT
Start: 2022-11-21 | End: 2022-11-22 | Stop reason: HOSPADM

## 2022-11-21 RX ORDER — SODIUM CHLORIDE 0.9 % (FLUSH) 0.9 %
5-40 SYRINGE (ML) INJECTION EVERY 12 HOURS SCHEDULED
Status: DISCONTINUED | OUTPATIENT
Start: 2022-11-21 | End: 2022-11-22 | Stop reason: HOSPADM

## 2022-11-21 RX ORDER — SODIUM CHLORIDE 9 MG/ML
INJECTION, SOLUTION INTRAVENOUS PRN
Status: DISCONTINUED | OUTPATIENT
Start: 2022-11-21 | End: 2022-11-22 | Stop reason: HOSPADM

## 2022-11-21 NOTE — LETTER
[address] [address]  Phone: [phone]    [address]        November 21, 2022     Patient: Dalton Guerrero   YOB: 1955   Date of Visit: 11/21/2022       To Whom It May Concern:    Dalton Guerrero has been under our care and may return to work on 11/28/22 with the following restrictions: no lifting the left arm above the shoulder and no lifting more than 20 pounds with the left arm through 1/2/22. If you have any questions or concerns, please don't hesitate to call.     Sincerely,        Zaire Padilla, [address] [facility]  [phone]

## 2022-11-21 NOTE — LETTER
Patient Name: Isis Berg  1955    Employees name (First, Middle, Last):  [employer]  Relationship to patient: niece    The above employee was present for her family member's procedure today. Please call if any further questions.       [facility]  [address] [address]  Dept: [phone]  Loc: [address]  11/21/22

## 2022-11-21 NOTE — PROCEDURES
[facility]     Electrophysiology Procedure Note       Date of Procedure: 11/21/2022  Patient's Name: Cisco Reyes  YOB: 1955   Medical Record Number: [id number]  Referring Physician: Janet att. providers found  Procedure Performed by: Hawk Bose MD    Procedures performed:   selective venogram to define axillary vein  Insertion of MRI compatible right atrial lead under fluoroscopy. Insertion of MRI compatible right ventricular  lead under fluoroscopy. Insertion of a MRI compatible dual chamber ICD  IV sedation done with a trained nursing staff under my supervision. Programming and analysis of the device    Sedation information:  Start: 1343 hrs  Stop: 1525 hrs  Versed: 4 mg  Fentanyl: 200 mcg    Indication of the procedure: Non-reversible symptomatic bradycardia, sinus node dysfunction, second/third degree AV block     Cisco Reyes 74 y.o. male with ICM, NYHA class II-III symptoms, on GDMT for >6 months with Echo showing LVEF 20-25% here for dual chamber ICD for primary prevention of SCD. Details of procedure: The patient was brought to the electrophysiology laboratory in stable condition. The patient was in a fasting and non-sedated state. The risks, benefits and alternatives of the procedure were discussed with the patient. The risks including, but not limited to, the risks of vascular injury, bleeding, infection, device malfunction, lead dislodgement, radiation exposure, injury to cardiac and surrounding structures (including pneumothorax), stroke, myocardial infarction and death were discussed in detail. Patient opted to proceed with the device implantation. Written informed consent was signed and placed in the chart. Prophylactic antibiotic was given. The patient was prepped and draped in a sterile fashion. A timeout protocol was completed to identify the patient and the procedure being performed.  Pre-sedation evaluation and airway assessment (Mallampatti classification, class IV) was completed. IV sedation was provided with IV Versed, Fentanyl. An incision was made in the left pectoral area after administration of lidocaine. Using electrocautery and blunt dissection, a pocket was created. Central venous access into the left axillary vein was obtained using the modified Seldinger technique. After central venous access was obtained, a sheath was placed in the axillary vein. A right ventricular pacing and shock lead was advanced into position apical septum under fluoroscopic guidance and using a series of curved stylets. The lead was actively fixated. After confirming appropriate function, the sheath was split and removed. The lead was secured to the underlying tissue using suture material.     A right atrial pacing lead was advanced into the atrial appendage under fluoroscopic guidance and using a series of curved stylets. The lead was actively fixated. After confirming appropriate function, the sheath was split and removed. The lead was secured to the underlying tissue using suture material.     The leads were then connected to the new pulse generator, dual chamber ICD, which was then placed into the cleaned pocket. The pulse generator was not sutured inside the pocket. The pocket was then closed in three separate subcutaneous layers using  2-0 Vicryl and subcuticular layer using 4-0 Vicryl . The skin was covered with pressure dressing. Steristrips was used on the skin. All sponge and needle counts were reported as correct at the end of the procedure. The patient tolerated the procedure well and there were no complications. Post-sedation evaluation was completed. Patient was transported to the holding area in stable condition. Blood TJZN<76 cc  No complication    Lead and device information:           Plan:   Usual post op care which include two view CXR (PA and Lateral).    If CXR and device function looks normal, then can be discharged home later today. Carelink will be given to the patient. Follow up with EP clinic for wound check in 1 week. Follow up with NP in 3 months.      Chente Norwood MD,   Cardiac Electrophysiology  [address], [facility] [address]  [phone]

## 2022-11-21 NOTE — DISCHARGE INSTRUCTIONS
Colocación de desfibrilador cardioversor implantable: Blondell Hunt en el hogar  ICD (Implantable Cardioverter-Defibrillator) Placement: What to Expect at Home  Graves recuperación     La colocación de un cardiodesfibrilador implantable (ICD, por emily siglas en inglés) es woody cirugía para colocarle un cardiodesfibrilador implantable en el pecho. Un cardiodesfibrilador implantable es un dispositivo pequeño que funciona a pilas y que corrige cambios potencialmente mortales en graves latido cardíaco.  Podría tener el pecho dolorido donde el médico hizo el carmina (incisión) y colocó el cardiodesfibrilador implantable. También podría tener un moretón y woody leve hinchazón. Estos síntomas generalmente mejoran en 1 a 2 semanas. Es posible que sienta woody cresta dura a lo maria de la incisión. Generalmente se vuelve más [employer] posteriores a la [facility]. Es posible que pueda nallely y sentir el contorno del cardiodesfibrilador implantable debajo de la piel. Ta vez pueda regresar al [place] o a graves rutina habitual al cabo de 1 o 2 semanas después de la operación. El médico le hablará sobre la frecuencia con la que necesitará que le revisen el cardiodesfibrilador implantable. Tendrá que jose a medidas para usar aparatos eléctricos de forma mann. Algunos de estos aparatos pueden interrumpir el funcionamiento correcto del cardiodesfibrilador implantable por un tiempo breve. Consulte con el médico acerca de lo que debe evitar y de lo que debe mantenerse a woody corta distancia del cardiodesfibrilador implantable. Por ejemplo, tendrá que mantenerse alejado de cosas con singh magnéticos y [address]. Un ejemplo es woody máquina de resonancia magnética (a menos que el cardiodesfibrilador implantable sea seguro para woody resonancia magnética). Puede usar un celular y otros dispositivos inalámbricos, sue manténgalos al menos a 6 pulgadas (15 cm) de distancia del cardiodesfibrilador implantable.  Muchos aparatos electrodomésticos y electrónicos de oficina no afectan un cardiodesfibrilador implantable. Esta hoja de [employer] idea general del tiempo que le llevará recuperarse. Sin embargo, cada persona se recupera a un ritmo diferente. Siga los pasos que se mencionan a continuación para recuperarse lo más rápido posible. ¿Cómo puede cuidarse en el hogar? Actividad    Descanse cuando se sienta cansado. Dormir lo suficiente le ayudará a recuperarse. Intente caminar todos los días. Comience caminando un poco más de lo que caminó el día anterior. Poco a poco, aumente la distancia. Caminar aumenta la irrigación sanguínea y [place] a prevenir la neumonía y el estreñimiento. No levante el brazo (en el lado de boogie cuerpo donde se encuentra el cardiodesfibrilador implantable o ICD, por emily siglas en inglés) por encima del nivel del hombro hasta que el médico lo autorice. [place] ayuda a mantener el cardiodesfibrilador implantable y las derivaciones en boogie lugar mientras usted lucas. El médico puede recomendarle ejercicios suaves de amplitud de movimiento para el hombro. Kayli al menos 3 o 4 semanas, o kayli el tiempo que le indique el médico:  Evite las actividades que impliquen un esfuerzo en el pecho o los músculos de la parte superior del brazo. [place]ot incluye empujar woody cortadora de césped o [place], fregar los suelos, nadar o jugar con un ernst de golf o woody raqueta de tenis. Evite las actividades intensas, sade montar en bicicleta, trotar, levantar pesas o hacer ejercicio aeróbico intenso. Evite levantar objetos que pudieran implicar un esfuerzo. [place] puede incluir bolsas de la compra pesadas y recipientes para [place], woody mochila o un maletín pesado, bolsas de arena para excrementos de jg o de alimento para perros, o un serjio. Pregúntele al médico cuándo puede volver a conducir. Es posible que tenga que tomarse alrededor de 1 o 2 semanas libres del [place].  Depende del tipo de trabajo que yumiko y de cómo se sienta. Pregúntele al médico cuándo puede tener relaciones sexuales. Alimentación    Puede continuar con boogie dieta normal. Si tiene malestar estomacal, coma alimentos suaves bajos en grasa, sade arroz sin condimentar, china a la alicia, pan rasheeda y yogur. Kala abundantes líquidos (a menos que el médico le indique lo contrario). Medicamentos    El médico le dirá si puede comenzar a jose a emily medicamentos de [place] y cuándo puede hacerlo. También recibirá instrucciones acerca de jose a cualquier medicamento nuevo. Si dejó de jose a aspirina o algún otro anticoagulante, boogie médico le dirá cuándo puede comenzar a tomarlo nuevamente. Green los analgésicos exactamente según las indicaciones. Si el médico le recetó un analgésico, tómelo según las indicaciones. Si no está tomando un analgésico recetado, pregúntele al médico si puede jose a annette de [employer]. No tome aspirina, ibuprofeno (Advil, Motrin), naproxeno (Aleve) u otros antiinflamatorios no esteroideos (KAREEM) a menos que [employer] diga que puede Marty. Si javad que el analgésico le está causando malestar estomacal:  Green el medicamento después de las comidas (a menos que el médico le haya indicado lo contrario). Pídale al médico un analgésico diferente. Cuidado de la incisión    Mantenga la kassidy limpia y [place] Pafos. Pregúntele al médico cuándo puede ducharse o bañarse. Si tiene tiras de cinta [employer] incisión, déjeselas puestas woody semana o hasta que se caigan por sí solas. Lave la kassidy a diario con [place] tibia y séquela con toques suaves de toalla. No use agua oxigenada ni alcohol, ya que pueden retrasar la sanación. Puede cubrir la kassidy con woody venda de gasa si exuda o roza contra la ropa. Ejercicio    Pregúntele al médico qué tipo de [place] e intensidad son seguras para usted.    Otras instrucciones    Pídale al médico woody lista de dispositivos electrónicos que podría necesitar mantener a [employer] corta distancia de boogie cardiodesfibrilador implantable (ICD, por emily siglas en inglés). Asegúrese de tener un plan de acción del médico sobre qué hacer si recibe woody descarga. Siempre lleve con lance la tarjeta de identificación del ICD. La tarjeta debe incluir el nombre del fabricante del ICD y la información sobre el [place] binta. Utilice un brazalete o collar de alerta médica que diga que tiene un ICD. Estos productos pueden comprarse en la mayoría de las [facility]. Informe a todos los [address] [place] [address], dentistas y otros profesionales de la nav que tiene un cardiodesfibrilador implantable antes de someterse a cualquier prueba, procedimiento o [facility]. Asegúrese de que sabe lo que hacer si escucha woody alarma o siente woody vibración del cardiodesfibrilador implantable. El médico puede darle instrucciones. Hágase revisar el cardiodesfibrilador implantable con la frecuencia que le indique el médico. En algunos casos, esto se puede hacer desde el hogar. El médico le dará instrucciones acerca de cómo hacer esto. Hable con el médico acerca de la posibilidad de apagar el ICD al final de boogie bonita. Lance puede expresar emily deseos acerca del ICD en un documento de instrucciones por adelantado para la [facility]. La atención de seguimiento es woody parte clave de boogie tratamiento y seguridad. Asegúrese de hacer y acudir a todas las citas, y llame a boogie médico si está teniendo problemas. También es woody buena idea saber los resultados de emily exámenes y mantener woody lista de los medicamentos que rodrigue. ¿Cuándo debe pedir ayuda? Llame al 911 en cualquier momento que considere que necesita atención de [place]. Por ejemplo, llame si:    Se desmayó (perdió el conocimiento). Tiene dificultad para respirar. Llame al médico ahora mismo o busque atención médica inmediata si:    Recibe un choque eléctrico de boogie cardiodesfibrilador implantable (ICD, por emily siglas en inglés).      Está mareado o aturdido, o siente que está a punto de desmayarse. Tiene dolor que no mejora después de jose a analgésicos. Escucha woody alarma o siente woody vibración del cardiodesfibrilador implantable que significa que debe llamar al médico.     Tiene puntos de sutura flojos o se le abre la incisión. El vendaje sobre la incisión está empapado con maurilio de color harley vivo. Tiene señales de infección, tales sade:  Aumento del dolor, la hinchazón, la temperatura o el enrojecimiento. Vetas rojizas que salen de la incisión. Pus que sale de la incisión. Angie Fails. Preste especial atención a los cambios en boogie nav y asegúrese de comunicarse con el médico si tiene algún problema. ¿Dónde puede encontrar más información en inglés? Kary Dodson a [email or web address] e ingrese a boogie cuenta de MyChart. Marivel Rowe [address] en el [employer] \"Search Health Information\" para más información (en inglés) sobre \"Colocación de desfibrilador cardioversor implantable: Blondell Hunt en el hogar. \"     Si no tiene woody cuenta, yumiko anibal en el enlace \"Sign Up Now\". Revisado: 10 enero, 2022               Versión del contenido: 13.4  © 2006-2022 Healthwise, Incorporated. Las instrucciones de cuidado fueron adaptadas bajo licencia por [facility]. Si usted tiene [place] afección médica o sobre estas instrucciones, siempre pregunte a boogie profesional de nav. Healthwise, Incorporated niega toda garantía o responsabilidad por boogie uso de esta información. ICD/PACEMAKER PLACEMENT DISCHARGE INSTRUCTIONS        No Driving for 2 days. No conducir kayli 2 antunez  We strongly recommend that a responsible adult stay with you for the next 24 hours. Do not make important / legal decisions within 24 hours post procedure. Do not lift more than 25 pounds for 4 weeks. No levante mas de 25 libras kayli 4 semanas  Do not lift affected arm above head for 4 weeks.   No levante elbrazo hernando por encima de la gabriela kayli 4 semanas  Leave dressing in place for one week. Deje el vendaje en boogie lugar kayli woody semana  Do not get the incision wet for one week. No moje la incision kayli woody semana  Remove pressure dressing 24 hours after procedure. Retire el vendaje compresivo 24 horas despues del procedimiento el 22/11/22 a las 3:30 PM  It is normal for site to be sore, bruised,  and/ or have a small amount of drainage around the incision site.    Es normal que el sitio rox dolorido magullado y/o tenga woody pequena cantidad de drenage alreadedor del sitio de la incision    Please contact the office if you notice any signs of infection:  [phone]      Comuniquese con la officina [phone]  si tiene enfojeciiento/hinhazon en el lugar de la incision, fiebre, secrecion amarilla o dolor  Redness / swelling at the incision site  Fever  Yellow drainage at the site  Pain          Electronically signed by Karmen Hough RN on 11/21/2022 at 2:52 PM

## 2022-11-22 LAB
EKG ATRIAL RATE: 81 BPM
EKG DIAGNOSIS: NORMAL
EKG P AXIS: 39 DEGREES
EKG P-R INTERVAL: 148 MS
EKG Q-T INTERVAL: 358 MS
EKG QRS DURATION: 110 MS
EKG QTC CALCULATION (BAZETT): 415 MS
EKG R AXIS: -77 DEGREES
EKG T AXIS: 41 DEGREES
EKG VENTRICULAR RATE: 81 BPM

## 2022-11-22 PROCEDURE — 93010 ELECTROCARDIOGRAM REPORT: CPT | Performed by: INTERNAL MEDICINE

## 2022-11-28 ENCOUNTER — NURSE ONLY (OUTPATIENT)
Dept: CARDIOLOGY CLINIC | Age: 67
End: 2022-11-28
Payer: COMMERCIAL

## 2022-11-28 DIAGNOSIS — I49.5 SINUS NODE DYSFUNCTION (HCC): ICD-10-CM

## 2022-11-28 DIAGNOSIS — Z95.810 CARDIAC DEFIBRILLATOR IN SITU: ICD-10-CM

## 2022-11-28 DIAGNOSIS — I25.5 ISCHEMIC CARDIOMYOPATHY: Primary | ICD-10-CM

## 2022-11-28 PROCEDURE — 93290 INTERROG DEV EVAL ICPMS IP: CPT | Performed by: INTERNAL MEDICINE

## 2022-11-28 PROCEDURE — 93283 PRGRMG EVAL IMPLANTABLE DFB: CPT | Performed by: INTERNAL MEDICINE

## 2022-11-28 NOTE — PROGRESS NOTES
Device programming evaluation for patient's dual chamber ICD by company representative shows normal device function. No new arrhythmias/events recorded. EP physician will review. See interrogation under cardiology tab in the [address] [place] [address] [address] field for more details.

## 2022-12-21 ENCOUNTER — OFFICE VISIT (OUTPATIENT)
Dept: CARDIOLOGY CLINIC | Age: 67
End: 2022-12-21
Payer: COMMERCIAL

## 2022-12-21 VITALS
HEART RATE: 75 BPM | HEIGHT: 66 IN | WEIGHT: 208 LBS | BODY MASS INDEX: 33.43 KG/M2 | SYSTOLIC BLOOD PRESSURE: 122 MMHG | DIASTOLIC BLOOD PRESSURE: 62 MMHG | OXYGEN SATURATION: 98 %

## 2022-12-21 DIAGNOSIS — I25.5 ISCHEMIC CARDIOMYOPATHY: ICD-10-CM

## 2022-12-21 DIAGNOSIS — I10 ESSENTIAL HYPERTENSION: ICD-10-CM

## 2022-12-21 DIAGNOSIS — I25.10 CORONARY ARTERY DISEASE INVOLVING NATIVE CORONARY ARTERY OF NATIVE HEART WITHOUT ANGINA PECTORIS: Primary | ICD-10-CM

## 2022-12-21 DIAGNOSIS — Z95.810 CARDIAC DEFIBRILLATOR IN SITU: ICD-10-CM

## 2022-12-21 DIAGNOSIS — I25.10 CORONARY ARTERY DISEASE INVOLVING NATIVE CORONARY ARTERY OF NATIVE HEART WITHOUT ANGINA PECTORIS: ICD-10-CM

## 2022-12-21 DIAGNOSIS — I25.83 CORONARY ARTERY DISEASE DUE TO LIPID RICH PLAQUE: ICD-10-CM

## 2022-12-21 DIAGNOSIS — I50.22 CHRONIC SYSTOLIC CONGESTIVE HEART FAILURE (HCC): ICD-10-CM

## 2022-12-21 DIAGNOSIS — I10 PRIMARY HYPERTENSION: ICD-10-CM

## 2022-12-21 DIAGNOSIS — R06.00 NOCTURNAL DYSPNEA: ICD-10-CM

## 2022-12-21 DIAGNOSIS — I25.10 CORONARY ARTERY DISEASE DUE TO LIPID RICH PLAQUE: ICD-10-CM

## 2022-12-21 DIAGNOSIS — E78.2 MIXED HYPERLIPIDEMIA: ICD-10-CM

## 2022-12-21 LAB
A/G RATIO: 1.4 (ref 1.1–2.2)
ALBUMIN SERPL-MCNC: 4.1 G/DL (ref 3.4–5)
ALP BLD-CCNC: 90 U/L (ref 40–129)
ALT SERPL-CCNC: 18 U/L (ref 10–40)
ANION GAP SERPL CALCULATED.3IONS-SCNC: 10 MMOL/L (ref 3–16)
AST SERPL-CCNC: 18 U/L (ref 15–37)
BILIRUB SERPL-MCNC: 0.4 MG/DL (ref 0–1)
BUN BLDV-MCNC: 19 MG/DL (ref 7–20)
CALCIUM SERPL-MCNC: 10.2 MG/DL (ref 8.3–10.6)
CHLORIDE BLD-SCNC: 101 MMOL/L (ref 99–110)
CHOLESTEROL, FASTING: 224 MG/DL (ref 0–199)
CO2: 27 MMOL/L (ref 21–32)
CREAT SERPL-MCNC: 1.2 MG/DL (ref 0.8–1.3)
GFR SERPL CREATININE-BSD FRML MDRD: >60 ML/MIN/{1.73_M2}
GLUCOSE BLD-MCNC: 59 MG/DL (ref 70–99)
HDLC SERPL-MCNC: 42 MG/DL (ref 40–60)
LDL CHOLESTEROL CALCULATED: 149 MG/DL
POTASSIUM SERPL-SCNC: 4.4 MMOL/L (ref 3.5–5.1)
SODIUM BLD-SCNC: 138 MMOL/L (ref 136–145)
TOTAL PROTEIN: 7 G/DL (ref 6.4–8.2)
TRIGLYCERIDE, FASTING: 167 MG/DL (ref 0–150)
VLDLC SERPL CALC-MCNC: 33 MG/DL

## 2022-12-21 PROCEDURE — 3078F DIAST BP <80 MM HG: CPT | Performed by: INTERNAL MEDICINE

## 2022-12-21 PROCEDURE — 1036F TOBACCO NON-USER: CPT | Performed by: INTERNAL MEDICINE

## 2022-12-21 PROCEDURE — 99214 OFFICE O/P EST MOD 30 MIN: CPT | Performed by: INTERNAL MEDICINE

## 2022-12-21 PROCEDURE — 3074F SYST BP LT 130 MM HG: CPT | Performed by: INTERNAL MEDICINE

## 2022-12-21 PROCEDURE — 3017F COLORECTAL CA SCREEN DOC REV: CPT | Performed by: INTERNAL MEDICINE

## 2022-12-21 PROCEDURE — G8417 CALC BMI ABV UP PARAM F/U: HCPCS | Performed by: INTERNAL MEDICINE

## 2022-12-21 PROCEDURE — G8427 DOCREV CUR MEDS BY ELIG CLIN: HCPCS | Performed by: INTERNAL MEDICINE

## 2022-12-21 PROCEDURE — G8484 FLU IMMUNIZE NO ADMIN: HCPCS | Performed by: INTERNAL MEDICINE

## 2022-12-21 PROCEDURE — 1123F ACP DISCUSS/DSCN MKR DOCD: CPT | Performed by: INTERNAL MEDICINE

## 2022-12-21 RX ORDER — ROSUVASTATIN CALCIUM 40 MG/1
40 TABLET, COATED ORAL DAILY
Qty: 90 TABLET | Refills: 3 | Status: SHIPPED | OUTPATIENT
Start: 2022-12-21

## 2022-12-21 RX ORDER — FUROSEMIDE 40 MG/1
TABLET ORAL
Qty: 90 TABLET | Refills: 2 | Status: SHIPPED | OUTPATIENT
Start: 2022-12-21

## 2022-12-21 RX ORDER — SPIRONOLACTONE 25 MG/1
25 TABLET ORAL DAILY
Qty: 90 TABLET | Refills: 5 | Status: SHIPPED | OUTPATIENT
Start: 2022-12-21

## 2022-12-21 NOTE — PROGRESS NOTES
[facility]      Cardiology Consult    Sherrie Omalley  [birth year]    December 21, 2022    Referring Physician: Mu Diaz MD  Reason for Referral: CAD, ischemic cardiomyopathy     CC: \"I'm doing good. \"     HPI:  The patient is 74 y.o. male with a past medical history significant for hypertension, hyperlipidemia, ischemic cardiomyopathy, and coronary artery disease who presents today for management of heart failure. He was initially seen for a pre-operative risk assessment prior to a colonoscopy scheduled 6/27/22. A Urdu phone  was used during the visit today. He reported a history of a prior MI in 2020 while living in [facility] but states a coronary angiogram was not performed. He stated he would be treated with medical therapy. A prior stress test was completed 3/2021 showed a large fixed defect with an LVEF of 28%. He denied a prior echocardiogram. He reported a history of gastric ulcers and follows with GI. He completed an echocardiogram that showed a severely reduced LVEF at 25%. He was started medical therapy but unfortunately his repeat echocardiogram showed the EF had not recovered at 20-25%. He was referred to Dr. Analy Galindo and underwent ICD placement 11/21/22. Today, he states overall he is doing well. He denies any chest pains or worsening SEGAL. He states he continues to remain active without any worsening exertional symptoms. He feels his exercise tolerance has overall improved and would like to return to the gym since his device placement. He reports compliance with his medications. He denies any abnormal bleeding or bruising. Patient denies exertional chest pain/pressure, dyspnea at rest, worsening SEGAL, PND, orthopnea, palpitations, lightheadedness, weight changes, changes in LE edema, and syncope.     Past Medical History:   Diagnosis Date    CAD (coronary artery disease)     Hyperlipidemia     Hypertension     Ischemic cardiomyopathy     MI (myocardial infarction) [facility])      Past Surgical History:   Procedure Laterality Date    CARDIAC DEFIBRILLATOR PLACEMENT       History reviewed. No pertinent family history. Social History     Tobacco Use    Smoking status: Never    Smokeless tobacco: Never   Vaping Use    Vaping Use: Never used   Substance Use Topics    Alcohol use: Never    Drug use: Never     Allergies   Allergen Reactions    Aspirin     Pcn [Penicillins]      Current Outpatient Medications   Medication Sig Dispense Refill    sacubitril-valsartan (ENTRESTO) 49-51 MG per tablet Take 1 tablet by mouth 2 times daily 60 tablet 11    rosuvastatin (CRESTOR) 40 MG tablet Take 1 tablet by mouth daily 90 tablet 3    furosemide (LASIX) 40 MG tablet TAKE 1 TABLET BY MOUTH DAILY AS NEEDED FOR WEIGHT GAIN OR SHORTNESS OF BREATH OR SWELLING 90 tablet 2    spironolactone (ALDACTONE) 25 MG tablet Take 1 tablet by mouth daily 90 tablet 5    clopidogrel (PLAVIX) 75 MG tablet Take 1 tablet by mouth daily 90 tablet 3    empagliflozin (JARDIANCE) 10 MG tablet Take 1 tablet by mouth daily 90 tablet 3    carvedilol (COREG) 12.5 MG tablet Take 12.5 mg by mouth 2 times daily (with meals)       No current facility-administered medications for this visit. Review of Systems:  Constitutional: No unanticipated weight loss. There's been no change in energy level, sleep pattern, or activity level. No fevers, chills. Eyes: No visual changes or diplopia. No scleral icterus. ENT: No Headaches, hearing loss or vertigo. No mouth sores or sore throat. Cardiovascular: as reviewed in HPI  Respiratory: No cough or wheezing, no sputum production. No hemoptysis. Gastrointestinal: No abdominal pain, appetite loss, blood in stools. No change in bowel or bladder habits. Genitourinary: No dysuria, trouble voiding, or hematuria. Musculoskeletal:  No gait disturbance, no joint complaints. Integumentary: No rash or pruritis.   Neurological: No headache, diplopia, change in muscle strength, numbness or tingling. Psychiatric: No anxiety or depression. Endocrine: No temperature intolerance. No excessive thirst, fluid intake, or urination. No tremor. Hematologic/Lymphatic: No abnormal bruising or bleeding, blood clots or swollen lymph nodes. Allergic/Immunologic: No nasal congestion or hives. Physical Exam:   /62 (Site: Left Upper Arm, Position: Sitting)   Pulse 75   Ht 5' 5.5\" (1.664 m)   Wt 208 lb (94.3 kg)   SpO2 98%   BMI 34.09 kg/m²   Wt Readings from Last 3 Encounters:   12/21/22 208 lb (94.3 kg)   11/21/22 207 lb (93.9 kg)   11/09/22 207 lb (93.9 kg)     Constitutional: He is oriented to person, place, and time. He appears well-developed and well-nourished. In no acute distress. Head: Normocephalic and atraumatic. Pupils equal and round. Neck: Neck supple. No JVP or carotid bruit appreciated. No mass and no thyromegaly present. No lymphadenopathy present. Cardiovascular: Normal rate. Normal heart sounds. Exam reveals no gallop and no friction rub. No murmur heard. Pulmonary/Chest: L-sided ICD. Effort normal and breath sounds normal. No respiratory distress. He has no wheezes, rhonchi or rales. Abdominal: Soft, non-tender. Bowel sounds are normal. He exhibits no organomegaly, mass or bruit. Extremities: No edema. No cyanosis or clubbing. Pulses are 2+ radial/carotid bilaterally. Neurological: No gross cranial nerve deficit. Coordination normal.   Skin: Skin is warm and dry. There is no rash or diaphoresis. Psychiatric: He has a normal mood and affect. His speech is normal and behavior is normal.     Lab Review:   FLP:    Lab Results   Component Value Date/Time    TRIG 84 05/19/2022 10:55 AM    HDL 42 05/19/2022 10:55 AM    LDLCALC 142 05/19/2022 10:55 AM    LABVLDL 17 05/19/2022 10:55 AM     BUN/Creatinine:    Lab Results   Component Value Date/Time    BUN 20 11/12/2022 08:34 AM    CREATININE 1.3 11/12/2022 08:34 AM     EKG Interpretation: 6/8/22 Sinus rhythm.  Possible left atrial enlargement. Extensive anterior-lateral and infero-posterior infarct. Nonspecific ST-T wave abnormalities. Image Review:     Stress test 3/2021   No stress induced ischemia   Large fixed severe perfusion defect involving the apical and mid portion of the walls   LVEF is 28%. Echo 7/20/22   The left ventricle appears dilated. Globally reduced left ventricular systolic function with an estimated ejection fraction of 20-25%. Grade II diastolic dysfunction with elevated LV filling pressures. Mild mitral regurgitation. The left atrium is moderately dilated. The right ventricle is normal in size and function. Mild to moderate tricuspid regurgitation. RVSP is 42 mmHg, this is consistent with mild-moderate pulmonary hypertension. Echo 10/21/22   Globally reduced left ventricular systolic function with an estimated  ejection fraction of 20-25% . Grade II diastolic dysfunction with elevated LV filling pressures. Mild to moderate mitral regurgitation. The left atrium is moderately dilated. The right ventricle is normal in size and function. Assessment/Plan:   1) CAD. Reports prior MI in 2020 with no coronary angiogram performed. Seemingly asymptomatic. Continue with medical management and risk factor modification including statin, Plavix, and B-blocker. Not on ASA with PUD. 2) Chronic systolic heart failure/Ischemic cardiomyopathy. EF 20-25%. S/p ICD 11/21/22. Pt appears euvolemic today. Continue with medical therapy including Entresto 49-51 mg BID, Coreg 12.5mg BID, Jardiance 10 mg daily, and aldactone 25mg daily. Continue Lasix 40 mg daily as needed. Encouraged low sodium diet and monitor weights daily. Encouraged to call the office for medication refills or worsening symptoms. Will check updated lab work today. 3) Essential hypertension. Controlled. Goal BP <130/80. Continue medical therapy. 4) Hyperlipidemia. Continue high intensity statin with Crestor 40 mg daily. .  Will repeat lipid panel. 5) History of gastric ulcer. Continue PPI and management per GI. Follow up in 6 months     Thank you very much for allowing me to participate in the care of your patient. Please do not hesitate to contact me if you have any questions. Sincerely,  Mariana Garcia. Spencer Zepeda, [address] [place] [address], [address] [place] [address]  Ph: [phone]  Fax: [phone]    This note was scribed in the presence of Dr Spencer Zepeda MD by Star Zhu RN. Physician Attestation: The scribes documentation has been prepared under my direction and personally reviewed by me in its entirety. I confirm that the note above accurately reflects all work, treatment, procedures, and medical decision making performed by me. All portions of the note including but not limited to the chief complaint, history of present illness, physical exam, assessment and plan/medical decision making were personally reviewed, edited, and updated on the day of the visit.

## 2022-12-27 DIAGNOSIS — I25.83 CORONARY ARTERY DISEASE DUE TO LIPID RICH PLAQUE: ICD-10-CM

## 2022-12-27 DIAGNOSIS — I25.10 CORONARY ARTERY DISEASE DUE TO LIPID RICH PLAQUE: ICD-10-CM

## 2022-12-27 DIAGNOSIS — R06.00 NOCTURNAL DYSPNEA: ICD-10-CM

## 2022-12-27 DIAGNOSIS — I10 PRIMARY HYPERTENSION: ICD-10-CM

## 2022-12-27 DIAGNOSIS — I25.5 ISCHEMIC CARDIOMYOPATHY: ICD-10-CM

## 2022-12-27 RX ORDER — CARVEDILOL 12.5 MG/1
12.5 TABLET ORAL 2 TIMES DAILY WITH MEALS
Qty: 180 TABLET | Refills: 3 | Status: SHIPPED | OUTPATIENT
Start: 2022-12-27

## 2023-01-13 ENCOUNTER — TELEPHONE (OUTPATIENT)
Dept: CARDIOLOGY CLINIC | Age: 68
End: 2023-01-13

## 2023-01-13 NOTE — TELEPHONE ENCOUNTER
Donal Kauffman came into the office today to complete his portion of his [employer] assistance paperwork. Scanned into chart, under Media .

## 2023-01-17 ENCOUNTER — NURSE ONLY (OUTPATIENT)
Dept: CARDIOLOGY CLINIC | Age: 68
End: 2023-01-17
Payer: COMMERCIAL

## 2023-01-17 DIAGNOSIS — Z95.810 CARDIAC DEFIBRILLATOR IN SITU: ICD-10-CM

## 2023-01-17 DIAGNOSIS — I25.5 ISCHEMIC CARDIOMYOPATHY: Primary | ICD-10-CM

## 2023-01-17 DIAGNOSIS — I50.22 CHRONIC SYSTOLIC HF (HEART FAILURE) (HCC): ICD-10-CM

## 2023-01-17 PROCEDURE — G2066 INTER DEVC REMOTE 30D: HCPCS | Performed by: NURSE PRACTITIONER

## 2023-01-17 PROCEDURE — 93297 REM INTERROG DEV EVAL ICPMS: CPT | Performed by: NURSE PRACTITIONER

## 2023-01-18 NOTE — PROGRESS NOTES
Remote transmission received from patient's dual chamber ICD home monitor. Transmission shows normal sensing and pacing function. No new arrhythmias/ events recorded. Optivol/TI is within normal range. NP will review. See interrogation under cardiology tab in the [address] [place] [address] [address] field for more details. Will continue to monitor remotely. (End of 31-day monitoring period 1/17/23).

## 2023-01-20 ENCOUNTER — APPOINTMENT (OUTPATIENT)
Dept: GENERAL RADIOLOGY | Age: 68
End: 2023-01-20
Payer: COMMERCIAL

## 2023-01-20 ENCOUNTER — HOSPITAL ENCOUNTER (EMERGENCY)
Age: 68
Discharge: HOME | End: 2023-01-20
Attending: EMERGENCY MEDICINE
Payer: COMMERCIAL

## 2023-01-20 VITALS
TEMPERATURE: 99.1 F | WEIGHT: 211.64 LBS | HEART RATE: 98 BPM | OXYGEN SATURATION: 94 % | DIASTOLIC BLOOD PRESSURE: 79 MMHG | RESPIRATION RATE: 18 BRPM | BODY MASS INDEX: 33.22 KG/M2 | HEIGHT: 67 IN | SYSTOLIC BLOOD PRESSURE: 144 MMHG

## 2023-01-20 DIAGNOSIS — J18.9 PNEUMONIA OF BOTH LOWER LOBES DUE TO INFECTIOUS ORGANISM: Primary | ICD-10-CM

## 2023-01-20 LAB
RAPID INFLUENZA  B AGN: NEGATIVE
RAPID INFLUENZA A AGN: NEGATIVE
SARS-COV-2, NAAT: NOT DETECTED

## 2023-01-20 PROCEDURE — 71045 X-RAY EXAM CHEST 1 VIEW: CPT

## 2023-01-20 PROCEDURE — 87635 SARS-COV-2 COVID-19 AMP PRB: CPT

## 2023-01-20 PROCEDURE — 87804 INFLUENZA ASSAY W/OPTIC: CPT

## 2023-01-20 PROCEDURE — 6370000000 HC RX 637 (ALT 250 FOR IP): Performed by: EMERGENCY MEDICINE

## 2023-01-20 PROCEDURE — 99284 EMERGENCY DEPT VISIT MOD MDM: CPT

## 2023-01-20 RX ORDER — PREDNISONE 20 MG/1
60 TABLET ORAL ONCE
Status: COMPLETED | OUTPATIENT
Start: 2023-01-20 | End: 2023-01-20

## 2023-01-20 RX ORDER — DOXYCYCLINE HYCLATE 100 MG
100 TABLET ORAL 2 TIMES DAILY
Qty: 14 TABLET | Refills: 0 | Status: SHIPPED | OUTPATIENT
Start: 2023-01-20 | End: 2023-01-27

## 2023-01-20 RX ORDER — PREDNISONE 50 MG/1
50 TABLET ORAL DAILY
Qty: 4 TABLET | Refills: 0 | Status: SHIPPED | OUTPATIENT
Start: 2023-01-20 | End: 2023-01-24

## 2023-01-20 RX ORDER — DOXYCYCLINE HYCLATE 100 MG
100 TABLET ORAL ONCE
Status: COMPLETED | OUTPATIENT
Start: 2023-01-20 | End: 2023-01-20

## 2023-01-20 RX ORDER — ALBUTEROL SULFATE 90 UG/1
2 AEROSOL, METERED RESPIRATORY (INHALATION) 4 TIMES DAILY PRN
Qty: 18 G | Refills: 0 | Status: SHIPPED | OUTPATIENT
Start: 2023-01-20

## 2023-01-20 RX ORDER — AZITHROMYCIN 500 MG/1
500 TABLET, FILM COATED ORAL ONCE
Status: COMPLETED | OUTPATIENT
Start: 2023-01-20 | End: 2023-01-20

## 2023-01-20 RX ORDER — ACETAMINOPHEN 500 MG
1000 TABLET ORAL ONCE
Status: COMPLETED | OUTPATIENT
Start: 2023-01-20 | End: 2023-01-20

## 2023-01-20 RX ORDER — AZITHROMYCIN 250 MG/1
250 TABLET, FILM COATED ORAL DAILY
Qty: 4 TABLET | Refills: 0 | Status: SHIPPED | OUTPATIENT
Start: 2023-01-20 | End: 2023-01-24

## 2023-01-20 RX ORDER — IPRATROPIUM BROMIDE AND ALBUTEROL SULFATE 2.5; .5 MG/3ML; MG/3ML
1 SOLUTION RESPIRATORY (INHALATION) ONCE
Status: COMPLETED | OUTPATIENT
Start: 2023-01-20 | End: 2023-01-20

## 2023-01-20 RX ADMIN — IPRATROPIUM BROMIDE AND ALBUTEROL SULFATE 1 AMPULE: .5; 2.5 SOLUTION RESPIRATORY (INHALATION) at 09:19

## 2023-01-20 RX ADMIN — AZITHROMYCIN MONOHYDRATE 500 MG: 500 TABLET ORAL at 09:19

## 2023-01-20 RX ADMIN — PREDNISONE 60 MG: 20 TABLET ORAL at 09:19

## 2023-01-20 RX ADMIN — DOXYCYCLINE HYCLATE 100 MG: 100 TABLET, COATED ORAL at 09:19

## 2023-01-20 RX ADMIN — ACETAMINOPHEN 1000 MG: 500 TABLET ORAL at 08:22

## 2023-01-20 ASSESSMENT — ENCOUNTER SYMPTOMS
SORE THROAT: 1
BLOOD IN STOOL: 0
WHEEZING: 1
BACK PAIN: 0
VOMITING: 0
PHOTOPHOBIA: 0
TROUBLE SWALLOWING: 0
COUGH: 1
COLOR CHANGE: 0
SHORTNESS OF BREATH: 0
ABDOMINAL PAIN: 0
VOICE CHANGE: 0
STRIDOR: 0
NAUSEA: 0
FACIAL SWELLING: 0

## 2023-01-20 ASSESSMENT — PAIN DESCRIPTION - LOCATION
LOCATION: GENERALIZED
LOCATION: GENERALIZED

## 2023-01-20 ASSESSMENT — PAIN SCALES - GENERAL
PAINLEVEL_OUTOF10: 1
PAINLEVEL_OUTOF10: 0
PAINLEVEL_OUTOF10: 0
PAINLEVEL_OUTOF10: 1

## 2023-01-20 ASSESSMENT — PAIN - FUNCTIONAL ASSESSMENT
PAIN_FUNCTIONAL_ASSESSMENT: 0-10
PAIN_FUNCTIONAL_ASSESSMENT: 0-10

## 2023-01-20 ASSESSMENT — PAIN DESCRIPTION - DESCRIPTORS: DESCRIPTORS: ACHING

## 2023-01-20 NOTE — DISCHARGE INSTR - COC
Continuity of Care Form    Patient Name: Timbo Tinoco   :  1955  MRN:  [id number]    Admit date:  2023  Discharge date:  ***    Code Status Order: Prior   Advance Directives:     Admitting Physician:  No admitting provider for patient encounter. PCP: Naima Gonzalez MD    Discharging Nurse: [facility] Unit/Room#:   Discharging Unit Phone Number: ***    Emergency Contact:   Extended Emergency Contact Information  Primary Emergency Contact: Dominik Carlos  Mobile Phone: [phone]  Relation: Niece/Nephew  Preferred language: Maori   needed?  Yes    Past Surgical History:  Past Surgical History:   Procedure Laterality Date    CARDIAC DEFIBRILLATOR PLACEMENT         Immunization History:   Immunization History   Administered Date(s) Administered    COVID-19, MODERNA BLUE border, Primary or Immunocompromised, (age 12y+), IM, 100 mcg/0.5mL 2021, 2021    COVID-19, MODERNA Booster BLUE border, (age 18y+), IM, 50mcg/0.25mL 2021    COVID-19, PFIZER Bivalent BOOSTER, DO NOT Dilute, (age 12y+), IM, 27 mcg/0.3 mL 10/29/2022    Influenza Virus Vaccine 10/03/2022    Pneumococcal conjugate PCV20, PF (Prevnar 20) 2022    Zoster Recombinant (Shingrix) 2022       Active Problems:  Patient Active Problem List   Diagnosis Code    Ischemic cardiomyopathy I25.5    Nocturnal dyspnea R06.00    Gastroesophageal reflux disease with esophagitis without hemorrhage K21.00    Hiatal hernia K44.9    Coronary artery disease due to lipid rich plaque I25.10, I25.83    Primary hypertension I10    Language barrier to communication Z78.9    Pure hypercholesterolemia E78.00    Abnormal findings on esophagogastroduodenoscopy (EGD) R19.8    Cardiac defibrillator in situ Z95.810    Symptomatic bradycardia R00.1    Sinus node dysfunction (HCC) I49.5    AV heart block I44.30       Isolation/Infection:   Isolation            No Isolation          Patient Infection Status       Infection Onset Added Last Indicated Last Indicated By Review Planned Expiration Resolved Resolved By    None active    Resolved    COVID-19 (Rule Out) 01/20/23 01/20/23 01/20/23 COVID-19, Rapid (Ordered)   01/20/23 Rule-Out Test Resulted    COVID-19 (Rule Out) 11/01/22 11/01/22 11/01/22 COVID-19 (Ordered)   11/02/22 Rule-Out Test Resulted    COVID-19 (Rule Out) 02/08/22 02/08/22 02/08/22 COVID-19, Rapid (Ordered)   02/08/22 Rule-Out Test Resulted            Nurse Assessment:  Last Vital Signs: BP (!) 144/79   Pulse 98   Temp 99.1 °F (37.3 °C) (Tympanic)   Resp 18   Ht 5' 7\" (1.702 m)   Wt 211 lb 10.3 oz (96 kg)   SpO2 94%   BMI 33.15 kg/m²     Last documented pain score (0-10 scale): Pain Level: 0  Last Weight:   Wt Readings from Last 1 Encounters:   01/20/23 211 lb 10.3 oz (96 kg)     Mental Status:  {IP PT MENTAL STATUS:20030}    IV Access:  { GENESIS IV ACCESS:014356528}    Nursing Mobility/ADLs:  Walking   {CHP DME XKBT:899108170}  Transfer  {CHP DME XOKD:316441753}  Bathing  {CHP DME XGZE:515475276}  Dressing  {CHP DME FAZO:571991502}  Toileting  {CHP DME NTAH:147085546}  Feeding  {CHP DME XCRL:650192881}  Med Admin  {CHP DME DLYN:969768167}  Med Delivery   { GENESIS MED Delivery:412086118}    Wound Care Documentation and Therapy:        Elimination:  Continence: Bowel: {YES / MI:45680}  Bladder: {YES / DB:75076}  Urinary Catheter: {Urinary Catheter:569152685}   Colostomy/Ileostomy/Ileal Conduit: {YES / CD:28017}       Date of Last BM: ***  No intake or output data in the 24 hours ending 01/20/23 1038  No intake/output data recorded.     Safety Concerns:     [address] GENESIS Safety Concerns:817260719}    Impairments/Disabilities:      [address] Impairments/Disabilities:664029332}    Nutrition Therapy:  Current Nutrition Therapy:   [address] Diet List:109057596}    Routes of Feeding: {CHP DME Other Feedings:662997749}  Liquids: {Slp liquid thickness:95765}  Daily Fluid Restriction: {CHP DME Yes amt JNKEQJT:385310809}  Last Modified Barium Swallow with Video (Video Swallowing Test): {Done Not Done UBDD:525761409}    Treatments at the Time of Hospital Discharge:   Respiratory Treatments: ***  Oxygen Therapy:  {Therapy; copd oxygen:41315}  Ventilator:    { CC Vent ERX}    Rehab Therapies: {THERAPEUTIC INTERVENTION:9254306409}  Weight Bearing Status/Restrictions: {[facility] Weight Bearin}  Other Medical Equipment (for information only, NOT a DME order):  {EQUIPMENT:866123259}  Other Treatments: ***    Patient's personal belongings (please select all that are sent with patient):  {[facility] DME Belongings:540162703}    RN SIGNATURE:  {Esignature:085742098}    CASE MANAGEMENT/SOCIAL WORK SECTION    Inpatient Status Date: ***    Readmission Risk Assessment Score:  Readmission Risk              Risk of Unplanned Readmission:  0           Discharging to Facility/ Agency   Name:   Address:  Phone:  Fax:    Dialysis Facility (if applicable)   Name:  Address:  Dialysis Schedule:  Phone:  Fax:    / signature: {Esignature:935663043}    PHYSICIAN SECTION    Prognosis: {Prognosis:2483013910}    Condition at Discharge: [facility] Patient Condition:528842811}    Rehab Potential (if transferring to Rehab): {Prognosis:4338625827}    Recommended Labs or Other Treatments After Discharge: ***    Physician Certification: I certify the above information and transfer of Dorenda Sessions  is necessary for the continuing treatment of the diagnosis listed and that he requires {Admit to Appropriate Level of Care:22956} for {GREATER/LESS:825058493} 30 days.      Update Admission H&P: {P DME Changes in AWUXN:755733636}    PHYSICIAN SIGNATURE:  {Esignature:594955088}

## 2023-01-20 NOTE — Clinical Note
Celeste Freire was seen and treated in our emergency department on 1/20/2023. He may return to work on 01/24/2023. If you have any questions or concerns, please don't hesitate to call.       Gretta Chinchilla MD

## 2023-01-20 NOTE — ED PROVIDER NOTES
[facility]  eMERGENCY dEPARTMENT eNCOUnter      Pt Name: Ramos Sun  MRN: [id number]  [place] 1955  Date of evaluation: 1/20/2023  Provider: Phuong Johnson MD    CHIEF COMPLAINT       Chief Complaint   Patient presents with    Pharyngitis    Generalized Body Aches    Fever     [place] hot last night per patient. Cough    Letter for School/Work         HISTORY OF PRESENT ILLNESS   (Location/Symptom, Timing/Onset, Context/Setting, Quality, Duration, Modifying Factors, Severity)  Note limiting factors. History obtained from: The patient via iPad [facility] #[id number]    Ramos Sun is a 74 y.o. male with hx of hypertension, hyperlipidemia, and CAD who presents with 1 day of sore throat, diffuse body aches, nonproductive cough, and wheezing. Patient denies any chest pain hemoptysis or leg swelling. Patient has any difficulty breathing or swallowing. Patient reports symptoms are moderate constant and worsening. Patient reports he has taken Tylenol for his symptoms however this was approximately 10 hours ago. HPI    Nursing Notes were reviewed. REVIEW OFSYSTEMS    (2-9 systems for level 4, 10 or more for level 5)     Review of Systems   Constitutional:  Positive for chills, fatigue and fever. Negative for appetite change and unexpected weight change. HENT:  Positive for sore throat. Negative for facial swelling, trouble swallowing and voice change. Eyes:  Negative for photophobia and visual disturbance. Respiratory:  Positive for cough and wheezing. Negative for shortness of breath and stridor. Cardiovascular:  Negative for chest pain and palpitations. Gastrointestinal:  Negative for abdominal pain, blood in stool, nausea and vomiting. Genitourinary:  Negative for difficulty urinating and dysuria. Musculoskeletal:  Positive for arthralgias and myalgias. Negative for back pain, gait problem and neck pain.    Skin:  Negative for color change and wound. Neurological:  Negative for seizures, syncope and speech difficulty. Psychiatric/Behavioral:  Negative for self-injury and suicidal ideas. Except as noted above the remainder of the review of systems was reviewed and negative. PAST MEDICAL HISTORY     Past Medical History:   Diagnosis Date    CAD (coronary artery disease)     Hyperlipidemia     Hypertension     Ischemic cardiomyopathy     MI (myocardial infarction) ([facility] [address]          SURGICAL HISTORY       Past Surgical History:   Procedure Laterality Date    CARDIAC DEFIBRILLATOR PLACEMENT           CURRENT MEDICATIONS       Previous Medications    CARVEDILOL (COREG) 12.5 MG TABLET    Take 1 tablet by mouth 2 times daily (with meals)    CLOPIDOGREL (PLAVIX) 75 MG TABLET    Take 1 tablet by mouth daily    EMPAGLIFLOZIN (JARDIANCE) 10 MG TABLET    Take 1 tablet by mouth daily    FUROSEMIDE (LASIX) 40 MG TABLET    TAKE 1 TABLET BY MOUTH DAILY AS NEEDED FOR WEIGHT GAIN OR SHORTNESS OF BREATH OR SWELLING    ROSUVASTATIN (CRESTOR) 40 MG TABLET    Take 1 tablet by mouth daily    SACUBITRIL-VALSARTAN (ENTRESTO) 49-51 MG PER TABLET    Take 1 tablet by mouth 2 times daily    SPIRONOLACTONE (ALDACTONE) 25 MG TABLET    Take 1 tablet by mouth daily       ALLERGIES     Aspirin and Pcn [penicillins]    FAMILY HISTORY     History reviewed. No pertinent family history.        SOCIAL HISTORY       Social History     Socioeconomic History    Marital status: Single     Spouse name: None    Number of children: None    Years of education: None    Highest education level: None   Tobacco Use    Smoking status: Never    Smokeless tobacco: Never   Vaping Use    Vaping Use: Never used   Substance and Sexual Activity    Alcohol use: Never    Drug use: Never    Sexual activity: Not Currently         PHYSICAL EXAM    (up to 7 for level 4, 8 or more for level 5)     ED Triage Vitals   BP Temp Temp src Pulse Resp SpO2 Height Weight   -- -- -- -- -- -- -- -- Physical Exam  Vitals and nursing note reviewed. Constitutional:       General: He is not in acute distress. Appearance: He is well-developed. HENT:      Head: Normocephalic and atraumatic. Right Ear: External ear normal.      Left Ear: External ear normal.      Mouth/Throat:      Pharynx: Posterior oropharyngeal erythema present. No oropharyngeal exudate. Tonsils: No tonsillar abscesses. Comments: Mild posterior oropharynx erythema. No exudate. Uvula midline. No signs of peritonsillar abscess. Patient breathing normal, speaking normally, no signs of impending airway obstruction. Eyes:      Conjunctiva/sclera: Conjunctivae normal.   Neck:      Vascular: No JVD. Trachea: No tracheal deviation. Cardiovascular:      Rate and Rhythm: Normal rate. Pulmonary:      Effort: Pulmonary effort is normal. No respiratory distress. Breath sounds: Wheezing present. Comments: Scant and expiratory wheezing equal bilaterally. Normal work of breathing. Patient speaking full sentences. No maria del carmen respiratory distress  Abdominal:      General: There is no distension. Palpations: Abdomen is soft. Tenderness: There is no abdominal tenderness. There is no guarding or rebound. Musculoskeletal:         General: No tenderness. Normal range of motion. Cervical back: Neck supple. Skin:     General: Skin is warm and dry. Capillary Refill: Capillary refill takes less than 2 seconds. Neurological:      General: No focal deficit present. Mental Status: He is alert and oriented to person, place, and time. Cranial Nerves: No cranial nerve deficit. DIAGNOSTIC RESULTS       RADIOLOGY:     Interpretation per the Radiologist below, if available at the time of this note:    XR CHEST PORTABLE   Final Result   Mild bibasilar atelectasis or airspace disease.              LABS:  Labs Reviewed   RAPID INFLUENZA A/B ANTIGENS   COVID-19, RAPID       All otherlabs were within normal range or not returned as of this dictation. EMERGENCY DEPARTMENT COURSE and DIFFERENTIAL DIAGNOSIS/MDM:   Vitals:    Vitals:    01/20/23 0810   BP: (!) 144/79   Pulse: 98   Resp: 18   Temp: 99.1 °F (37.3 °C)   TempSrc: Tympanic   SpO2: 94%   Weight: 211 lb 10.3 oz (96 kg)   Height: 5' 7\" (1.702 m)       Patient was given the following medications:  Medications   acetaminophen (TYLENOL) tablet 1,000 mg (1,000 mg Oral Given 1/20/23 8396)   predniSONE (DELTASONE) tablet 60 mg (60 mg Oral Given 1/20/23 0919)   azithromycin (ZITHROMAX) tablet 500 mg (500 mg Oral Given 1/20/23 0919)   doxycycline hyclate (VIBRA-TABS) tablet 100 mg (100 mg Oral Given 1/20/23 0919)   ipratropium-albuterol (DUONEB) nebulizer solution 1 ampule (1 ampule Inhalation Given 1/20/23 0919)       CC/HPI Summary, DDx, ED Course, Reassessment, Disposition Considerations (include Tests not done, Admit vs D/C, Shared Decision Making, Pt Expectation of Test or Tx.):  Patient is afebrile, nontoxic-appearing, in no acute distress. Patient does not have any posterior exudate, cervical adenopathy, is afebrile on exam, does report a cough therefore has a very low Centor score I do not suspect strep throat at this time. Chest x-ray which is ordered and read by the radiologist as well as independently reviewed by myself does show mild bibasilar airspace disease consistent with pneumonia given patient's symptoms. Rapid influenza and COVID test are negative. Given patient is wheezing he is given a breathing treatment and steroids and I feel he is appropriate for azithromycin and doxycycline based on his allergy to penicillins and pneumonia on x-ray. Strict ER return precautions are given for difficulty breathing or new or worsening symptoms. Patient expresses understanding and agreement with this plan and is discharged home.   I have considered acute coronary syndrome, pulmonary embolism, sepsis, or pneumonia requiring laboratory evaluation IV antibiotics and have considered laboratory evaluation and CT imaging however do not feel that is indicated based on patient's current symptoms and feel he is appropriate for a trial of p.o. treatments as an outpatient and strict ER return precautions. Patient does express understanding and agreement with this plan. Very strict return precautions are discussed with the patient and using an iPad . I have considered and evaluated for the following diagnoses and estimate there is low risk for acute coronary syndrome, pericardial tamponade, pneumothorax, pulmonary embolism, sepsis, aortic dissection, or severe case of COPD and/or pneumonia which require inpatient care and thus I consider the discharge disposition reasonable. We have discussed the diagnosis and risks, and we agree with discharging home to follow-up with their primary doctor. We also discussed returning to the Emergency Department immediately if new or worsening symptoms occur. We have discussed the symptoms which are most concerning (e.g., worsening shortness of breath or trouble breathing, chest pain, blueness around the lips or extremities, or other concerning sympoms) that necessitate immediate return. FINAL IMPRESSION      1.  Pneumonia of both lower lobes due to infectious organism          DISPOSITION/PLAN     DISPOSITION Decision To Discharge 01/20/2023 09:09:14 AM      PATIENT REFERRED TO:  Nas Viera MD  [address]  [phone]    In 4 days      [facility] [id number] [phone]    If symptoms worsen    DISCHARGE MEDICATIONS:  New Prescriptions    ALBUTEROL SULFATE HFA (VENTOLIN HFA) 108 (90 BASE) MCG/ACT INHALER    Inhale 2 puffs into the lungs 4 times daily as needed for Wheezing    AZITHROMYCIN (ZITHROMAX Z-ANTELMO) 250 MG TABLET    Take 1 tablet by mouth daily for 4 days    DOXYCYCLINE HYCLATE (VIBRA-TABS) 100 MG TABLET    Take 1 tablet by mouth 2 times daily for 7 days    PREDNISONE (DELTASONE) 50 MG TABLET    Take 1 tablet by mouth daily for 4 days           (Please note that portions of this note were completed with a voice recognition program.  Efforts were made to edit the dictations but occasionally words are mis-transcribed. )    Elliott Orta MD (electronically signed)  Attending Emergency Physician           Elliott Orta MD  01/20/23 8589

## 2023-01-20 NOTE — ED NOTES
Discharge instructions reviewed. Patient verbalized understanding. Prescription x4 sent to pharmacy.        Lieutenant Vilma RN  01/20/23 2354

## 2023-01-20 NOTE — Clinical Note
Alyson Hancock was seen and treated in our emergency department on 1/20/2023. He may return to work on 01/24/2023. If you have any questions or concerns, please don't hesitate to call.       Alejandra Garcia MD

## 2023-01-20 NOTE — ED NOTES
Spoke to  [phone] regarding giving patients medications. Patient verbalized understanding with no questions at this time.         Edgar Barrett RN  01/20/23 7891

## 2023-01-20 NOTE — ED NOTES
Discharge instructions reviewed per Dr. Cody Saenz using interpeter [id number]. Patient and wife verbalized understanding. No questions at this time.        Sabino Hernandez RN  01/20/23 2037

## 2023-01-20 NOTE — ED TRIAGE NOTES
Patient came to ER with complaints of sore throat, fever, body aches and some shortness of breath. Patient stated symptoms started last night.

## 2023-02-20 NOTE — PROGRESS NOTES
Cardiac Electrophysiology Consultation   Date: 2/22/2023   Reason for Consultation: ICD  Consult Requesting Physician: Chavez Maza MD  Primary Care Physician: Katie Lang MD     Chief Complaint:   Chief Complaint   Patient presents with    Follow-up     No/cc        HPI: Tristan Fitzgerald is a 74 y.o. patient with a history of gastric ulcers hypertension, hyperlipidemia, ischemic cardiomyopathy, coronary artery disease and reported a history of a prior MI in 2020 while living in [facility] . Stress test 3/2021 showed a large fixed defect with an LVEF of 28%. Echo on 7/25/2022 showed LVEF 20-25 % and repeat Echo after optimized guideline directed medical therapy 10/21/2022 showed LVEF remained depressed at 20- 25 %. He was seen 11/09/22 for ICD evaluation. He had class I indication for ICD for primary prevention given LVEF <35%, on GDMT >3 months, FC II-III. He subsequently had a Medtronic dual chamber ICD placed on 11/21/2022. Today, he presents to office accompanied by his wife for follow up s/p ICD. We are utilizing [facility]  services. His device was interrogated today is functioning properly. EKG today shows NSR. He is compliant with his medications and tolerating them well. He denies chest pain/pressure, tightness, edema, shortness of breath, heart racing, palpitations, lightheadedness, dizziness, syncope, presyncope,  PND or orthopnea. Past Medical History:   Diagnosis Date    CAD (coronary artery disease)     Hyperlipidemia     Hypertension     Ischemic cardiomyopathy     MI (myocardial infarction) ([facility] [address]       Past Surgical History:   Procedure Laterality Date    CARDIAC DEFIBRILLATOR PLACEMENT         Allergies: Allergies   Allergen Reactions    Aspirin     Pcn [Penicillins]      Medication:   Prior to Admission medications    Medication Sig Start Date End Date Taking?  Authorizing Provider   albuterol sulfate HFA (VENTOLIN HFA) 108 (90 Base) MCG/ACT inhaler Inhale 2 puffs into the lungs 4 times daily as needed for Wheezing 1/20/23  Yes Lisette Salmeron MD   carvedilol (COREG) 12.5 MG tablet Take 1 tablet by mouth 2 times daily (with meals) 12/27/22  Yes Orin Hamilton MD   sacubitril-valsartan [facility]) 49-51 MG per tablet Take 1 tablet by mouth 2 times daily 12/21/22  Yes Orin Hamilton MD   rosuvastatin (CRESTOR) 40 MG tablet Take 1 tablet by mouth daily 12/21/22  Yes Orin Hamilton MD   furosemide (LASIX) 40 MG tablet TAKE 1 TABLET BY MOUTH DAILY AS NEEDED FOR WEIGHT GAIN OR SHORTNESS OF BREATH OR SWELLING 12/21/22  Yes Orin Hamilton MD   clopidogrel (PLAVIX) 75 MG tablet Take 1 tablet by mouth daily 8/31/22  Yes Orin Hamilton MD   empagliflozin (JARDIANCE) 10 MG tablet Take 1 tablet by mouth daily 8/31/22  Yes Orin Hamilton MD   spironolactone (ALDACTONE) 25 MG tablet Take 1 tablet by mouth daily  Patient not taking: Reported on 2/22/2023 12/21/22   Orin Hamilton MD     Social History:   reports that he has never smoked. He has never used smokeless tobacco. He reports that he does not drink alcohol and does not use drugs. Family History:  family history is not on file. Reviewed. Denies family history of sudden cardiac death, arrhythmia, premature CAD    Review of System:  Pertinent positive and negatives are in the HPI, the rest are negative. Physical Examination:  /70   Pulse 70   Ht 5' 7\" (1.702 m)   Wt 211 lb 12.8 oz (96.1 kg)   SpO2 99%   BMI 33.17 kg/m²      Constitutional: Oriented. No distress. Head: Normocephalic and atraumatic. Mouth/Throat: Oropharynx is clear and moist.   Eyes: Conjunctivae normal. EOM are normal.   Neck: Normal range of motion. Neck supple. No rigidity. No JVD present. Cardiovascular: Normal rate, regular rhythm, S1&S2 and intact distal pulses. Pulmonary/Chest: Bilateral respiratory sounds. No wheezes. No rhonchi. Abdominal: Soft. Bowel sounds present. No distension, No tenderness.    Musculoskeletal: No tenderness. No edema    Lymphadenopathy: Has no cervical adenopathy. Neurological: Alert and oriented. Cranial nerve appears intact, No Gross deficit   Skin: Skin is warm and dry. No rash noted. Psychiatric: Has a normal mood, affect and behavior     Labs:  Reviewed. ECG: reviewed, normal sinus rhythm with v-rate of 72 bpm with QRS duration 112  ms, old anterior infarct. No ventricular pre-excitation, or QT prolongation. Studies:   1. Event monitor: n/a    2. Echo: 7/20/22   The left ventricle appears dilated. Globally reduced left ventricular systolic function with an estimated ejection fraction of 20-25%. Grade II diastolic dysfunction with elevated LV filling pressures. Mild mitral regurgitation. The left atrium is moderately dilated. The right ventricle is normal in size and function. Mild to moderate tricuspid regurgitation. RVSP is 42 mmHg, this is consistent with mild-moderate pulmonary hypertension. 3. Stress Test:  Stress test 3/2021   No stress induced ischemia   Large fixed severe perfusion defect involving the apical and mid portion of the walls   LVEF is 28%. 4. Cath: n/a    I independently reviewed the ECG, MCOT, echocardiogram, stress test, and coronary angiography/PCI results and used them for my plan of care. Procedures:  1. 11/21/2022 Dual chamber ICD    Assessment/Plan:     Chronic systolic heart failure  Ischemic cardiomyopathy. - EF 20-25 % per Echo 7/20/2022 repeat Echo 10/21/2022 LVEF remained   - Continue optimized guideline directed medical therapy, which patient has been on for greater than three months. Beta Blocker: Carvedilol 12.5 mg BID                Aldosterone Antagonist: Spironolactone 25 mg daily. Diuretic: Furosemide 40 mg daily. ACE/ ARNI/ARB: Entersto 49/51mg BID.     SGLT2 Inhibitor: Jardiance 10 mg daily.    - S/p Dual chamber ICD 11/21/22  - Medtronic dual chamber ICD implanted on 11/21/2022.  - Last remote on 01/17/23  - Device interrogated today and based on threshold, impedance, and intrinsic sensing tests run today, device appears to functioning with minimal deviation from established trends. -AP 0.6%  - <0.1%  -AT/AF burden 0%  -PVC 4.3/hr  -No episodes noted. - Estimated battery longevity 12.7 y.  - Euvolemic on today's exam.    Mr. Jennifer Tierney is doing well since ICD implant. Device check shows no arrhythmias and is not pacing much from either the atrium or ventricle. Continue follow up with HF for cardiomyopathy. Coronary artery disease   - Reports prior MI in 2020 with no coronary angiogram performed  - Continue with medical management and risk factor modification including plavix, statin, and beta blocker. - Not on ASA due to history of PUD. Essential hypertension   - Goal <130/80   - Controlled    - Continue current medical management. Hyperlipidemia  - Continue high intensity statin with Crestor 40 mg daily.   -  on 12/21/22      Follow ups: Follow up with Dimple Suh in 6 months  Continue routine follow up with Dicky Fabry, MD.    Thank you for allowing me to participate in the care of [facility]. All questions and concerns were addressed to the patient/family. Alternatives to my treatment were discussed. This note was scribed in the presence of  Luis Boggs MD by Bill Prince LPN     Physician attestation: The scribe's documentation has been prepared under my direction and has been personally reviewed by me in its entirety. I confirm that the note above reflects all work, treatment, procedures, and medical decision making performed by me.        Luis Boggs MD  Cardiac Electrophysiology  [facility]

## 2023-02-22 ENCOUNTER — OFFICE VISIT (OUTPATIENT)
Dept: CARDIOLOGY CLINIC | Age: 68
End: 2023-02-22
Payer: COMMERCIAL

## 2023-02-22 ENCOUNTER — NURSE ONLY (OUTPATIENT)
Dept: CARDIOLOGY CLINIC | Age: 68
End: 2023-02-22
Payer: COMMERCIAL

## 2023-02-22 VITALS
BODY MASS INDEX: 33.24 KG/M2 | WEIGHT: 211.8 LBS | HEIGHT: 67 IN | OXYGEN SATURATION: 99 % | DIASTOLIC BLOOD PRESSURE: 70 MMHG | SYSTOLIC BLOOD PRESSURE: 110 MMHG | HEART RATE: 70 BPM

## 2023-02-22 DIAGNOSIS — I25.5 ISCHEMIC CARDIOMYOPATHY: ICD-10-CM

## 2023-02-22 DIAGNOSIS — I50.22 CHRONIC SYSTOLIC (CONGESTIVE) HEART FAILURE (HCC): ICD-10-CM

## 2023-02-22 DIAGNOSIS — I25.10 CORONARY ARTERY DISEASE INVOLVING NATIVE CORONARY ARTERY OF NATIVE HEART WITHOUT ANGINA PECTORIS: ICD-10-CM

## 2023-02-22 DIAGNOSIS — I50.22 CHRONIC SYSTOLIC HF (HEART FAILURE) (HCC): ICD-10-CM

## 2023-02-22 DIAGNOSIS — I49.5 SINUS NODE DYSFUNCTION (HCC): ICD-10-CM

## 2023-02-22 DIAGNOSIS — Z95.810 CARDIAC DEFIBRILLATOR IN SITU: Primary | ICD-10-CM

## 2023-02-22 DIAGNOSIS — I10 ESSENTIAL HYPERTENSION: Primary | ICD-10-CM

## 2023-02-22 DIAGNOSIS — R00.1 SYMPTOMATIC BRADYCARDIA: ICD-10-CM

## 2023-02-22 DIAGNOSIS — I44.30 AV HEART BLOCK: ICD-10-CM

## 2023-02-22 PROCEDURE — 1036F TOBACCO NON-USER: CPT | Performed by: INTERNAL MEDICINE

## 2023-02-22 PROCEDURE — 3017F COLORECTAL CA SCREEN DOC REV: CPT | Performed by: INTERNAL MEDICINE

## 2023-02-22 PROCEDURE — 3074F SYST BP LT 130 MM HG: CPT | Performed by: INTERNAL MEDICINE

## 2023-02-22 PROCEDURE — G8427 DOCREV CUR MEDS BY ELIG CLIN: HCPCS | Performed by: INTERNAL MEDICINE

## 2023-02-22 PROCEDURE — 93290 INTERROG DEV EVAL ICPMS IP: CPT | Performed by: INTERNAL MEDICINE

## 2023-02-22 PROCEDURE — 1123F ACP DISCUSS/DSCN MKR DOCD: CPT | Performed by: INTERNAL MEDICINE

## 2023-02-22 PROCEDURE — 93000 ELECTROCARDIOGRAM COMPLETE: CPT | Performed by: INTERNAL MEDICINE

## 2023-02-22 PROCEDURE — 99215 OFFICE O/P EST HI 40 MIN: CPT | Performed by: INTERNAL MEDICINE

## 2023-02-22 PROCEDURE — 93283 PRGRMG EVAL IMPLANTABLE DFB: CPT | Performed by: INTERNAL MEDICINE

## 2023-02-22 PROCEDURE — 3078F DIAST BP <80 MM HG: CPT | Performed by: INTERNAL MEDICINE

## 2023-02-22 PROCEDURE — G8482 FLU IMMUNIZE ORDER/ADMIN: HCPCS | Performed by: INTERNAL MEDICINE

## 2023-02-22 PROCEDURE — G8417 CALC BMI ABV UP PARAM F/U: HCPCS | Performed by: INTERNAL MEDICINE

## 2023-02-22 NOTE — PROGRESS NOTES
Patient comes in for programming evaluation on their Medtronic [id number] [facility] ICD. Last remote on 1/17/23    All sensing and pacing parameters are within normal range. Battery life 12.7y  P-AsVs @ 73bpm  AP 0.6%.  <0.1%. AT/AF burden 0%  PVC 4.3/hr  No episodes noted. Patient remains on Lasix, Carvedilol  Changed EGM sources, reduced A amp from 3.5V to 2.25V and RV amp from 3.5V to 2V. Please see interrogation for more detail. Optivol is within normal range. Patient will see Leyla Egan today and follow up in 3 months in office or remotely.

## 2023-02-23 ENCOUNTER — OFFICE VISIT (OUTPATIENT)
Dept: INTERNAL MEDICINE CLINIC | Age: 68
End: 2023-02-23
Payer: COMMERCIAL

## 2023-02-23 VITALS
DIASTOLIC BLOOD PRESSURE: 84 MMHG | OXYGEN SATURATION: 97 % | SYSTOLIC BLOOD PRESSURE: 122 MMHG | WEIGHT: 210 LBS | BODY MASS INDEX: 32.96 KG/M2 | HEIGHT: 67 IN | HEART RATE: 88 BPM

## 2023-02-23 DIAGNOSIS — R53.83 OTHER FATIGUE: ICD-10-CM

## 2023-02-23 DIAGNOSIS — Z78.9 LANGUAGE BARRIER TO COMMUNICATION: ICD-10-CM

## 2023-02-23 DIAGNOSIS — I10 PRIMARY HYPERTENSION: Primary | ICD-10-CM

## 2023-02-23 DIAGNOSIS — J18.9 PNEUMONIA OF BOTH LUNGS DUE TO INFECTIOUS ORGANISM, UNSPECIFIED PART OF LUNG: ICD-10-CM

## 2023-02-23 DIAGNOSIS — I10 PRIMARY HYPERTENSION: ICD-10-CM

## 2023-02-23 DIAGNOSIS — I25.5 ISCHEMIC CARDIOMYOPATHY: ICD-10-CM

## 2023-02-23 DIAGNOSIS — Z23 NEED FOR SHINGLES VACCINE: ICD-10-CM

## 2023-02-23 LAB
TSH REFLEX: 2.01 UIU/ML (ref 0.27–4.2)
VITAMIN D 25-HYDROXY: 19.9 NG/ML

## 2023-02-23 PROCEDURE — 99214 OFFICE O/P EST MOD 30 MIN: CPT | Performed by: INTERNAL MEDICINE

## 2023-02-23 PROCEDURE — 90471 IMMUNIZATION ADMIN: CPT | Performed by: INTERNAL MEDICINE

## 2023-02-23 PROCEDURE — 3079F DIAST BP 80-89 MM HG: CPT | Performed by: INTERNAL MEDICINE

## 2023-02-23 PROCEDURE — G8427 DOCREV CUR MEDS BY ELIG CLIN: HCPCS | Performed by: INTERNAL MEDICINE

## 2023-02-23 PROCEDURE — 3017F COLORECTAL CA SCREEN DOC REV: CPT | Performed by: INTERNAL MEDICINE

## 2023-02-23 PROCEDURE — 90472 IMMUNIZATION ADMIN EACH ADD: CPT | Performed by: INTERNAL MEDICINE

## 2023-02-23 PROCEDURE — 90750 HZV VACC RECOMBINANT IM: CPT | Performed by: INTERNAL MEDICINE

## 2023-02-23 PROCEDURE — 1036F TOBACCO NON-USER: CPT | Performed by: INTERNAL MEDICINE

## 2023-02-23 PROCEDURE — G8417 CALC BMI ABV UP PARAM F/U: HCPCS | Performed by: INTERNAL MEDICINE

## 2023-02-23 PROCEDURE — G8482 FLU IMMUNIZE ORDER/ADMIN: HCPCS | Performed by: INTERNAL MEDICINE

## 2023-02-23 PROCEDURE — 1123F ACP DISCUSS/DSCN MKR DOCD: CPT | Performed by: INTERNAL MEDICINE

## 2023-02-23 PROCEDURE — 3074F SYST BP LT 130 MM HG: CPT | Performed by: INTERNAL MEDICINE

## 2023-02-23 PROCEDURE — 90715 TDAP VACCINE 7 YRS/> IM: CPT | Performed by: INTERNAL MEDICINE

## 2023-02-23 RX ORDER — ALBUTEROL SULFATE 90 UG/1
2 AEROSOL, METERED RESPIRATORY (INHALATION) 4 TIMES DAILY PRN
Qty: 18 G | Refills: 1 | Status: SHIPPED | OUTPATIENT
Start: 2023-02-23

## 2023-02-23 SDOH — ECONOMIC STABILITY: FOOD INSECURITY: WITHIN THE PAST 12 MONTHS, THE FOOD YOU BOUGHT JUST DIDN'T LAST AND YOU DIDN'T HAVE MONEY TO GET MORE.: NEVER TRUE

## 2023-02-23 SDOH — ECONOMIC STABILITY: HOUSING INSECURITY
IN THE LAST 12 MONTHS, WAS THERE A TIME WHEN YOU DID NOT HAVE A STEADY PLACE TO SLEEP OR SLEPT IN A SHELTER (INCLUDING NOW)?: NO

## 2023-02-23 SDOH — ECONOMIC STABILITY: INCOME INSECURITY: HOW HARD IS IT FOR YOU TO PAY FOR THE VERY BASICS LIKE FOOD, HOUSING, MEDICAL CARE, AND HEATING?: NOT HARD AT ALL

## 2023-02-23 SDOH — ECONOMIC STABILITY: FOOD INSECURITY: WITHIN THE PAST 12 MONTHS, YOU WORRIED THAT YOUR FOOD WOULD RUN OUT BEFORE YOU GOT MONEY TO BUY MORE.: NEVER TRUE

## 2023-02-23 ASSESSMENT — PATIENT HEALTH QUESTIONNAIRE - PHQ9
SUM OF ALL RESPONSES TO PHQ9 QUESTIONS 1 & 2: 0
1. LITTLE INTEREST OR PLEASURE IN DOING THINGS: 0
SUM OF ALL RESPONSES TO PHQ QUESTIONS 1-9: 0
2. FEELING DOWN, DEPRESSED OR HOPELESS: 0
SUM OF ALL RESPONSES TO PHQ QUESTIONS 1-9: 0

## 2023-02-23 ASSESSMENT — ENCOUNTER SYMPTOMS
ABDOMINAL PAIN: 0
CHEST TIGHTNESS: 0
NAUSEA: 0
PHOTOPHOBIA: 0
VOMITING: 0
TROUBLE SWALLOWING: 0
COUGH: 0

## 2023-02-23 NOTE — PROGRESS NOTES
Rupali Hawkins  1955  male  74 y.o. SUBJECTIVE:       Chief Complaint   Patient presents with    3 Month Follow-Up    Immunizations     #2 shingles       HPI:  Follow-up visit for chronic problems. Patient was recently seen in the emergency room for possible pneumonia. He denies fever chills nausea vomiting shortness of breath. History of reactive airway disease. Patient gets occasional wheezing. Use albuterol about once every 5 days. History of cardiomyopathy. Get exertional fatigue and tiredness. Patient has been following up with the cardiologist.    Past Medical History:   Diagnosis Date    CAD (coronary artery disease)     Hyperlipidemia     Hypertension     Ischemic cardiomyopathy     MI (myocardial infarction) ([facility] [address]      Past Surgical History:   Procedure Laterality Date    CARDIAC DEFIBRILLATOR PLACEMENT       Social History     Socioeconomic History    Marital status: Single     Spouse name: None    Number of children: None    Years of education: None    Highest education level: None   Tobacco Use    Smoking status: Never    Smokeless tobacco: Never   Vaping Use    Vaping Use: Never used   Substance and Sexual Activity    Alcohol use: Never    Drug use: Never    Sexual activity: Not Currently     Social Determinants of Health     Financial Resource Strain: Low Risk     Difficulty of Paying Living Expenses: Not hard at all   Food Insecurity: No Food Insecurity    Worried About Running Out of Food in the Last Year: Never true    [address] in the Last Year: Never true   Transportation Needs: Unknown    Lack of Transportation (Non-Medical): No   Housing Stability: Unknown    Unstable Housing in the Last Year: No     History reviewed. No pertinent family history. Review of Systems   Constitutional:  Positive for fatigue. Negative for diaphoresis and unexpected weight change. HENT:  Negative for trouble swallowing. Eyes:  Negative for photophobia and visual disturbance. Respiratory:  Negative for cough and chest tightness. Cardiovascular:  Negative for chest pain, palpitations and leg swelling. Gastrointestinal:  Negative for abdominal pain, nausea and vomiting. Endocrine: Negative for polyphagia and polyuria. Neurological:  Negative for dizziness, light-headedness and headaches. OBJECTIVE:  Pulse Readings from Last 4 Encounters:   02/23/23 88   02/22/23 70   01/20/23 98   12/21/22 75     Wt Readings from Last 4 Encounters:   02/23/23 210 lb (95.3 kg)   02/22/23 211 lb 12.8 oz (96.1 kg)   01/20/23 211 lb 10.3 oz (96 kg)   12/21/22 208 lb (94.3 kg)     BP Readings from Last 4 Encounters:   02/23/23 122/84   02/22/23 110/70   01/20/23 (!) 144/79   12/21/22 122/62     Physical Exam  Vitals and nursing note reviewed. Constitutional:       Appearance: Normal appearance. Eyes:      Conjunctiva/sclera: Conjunctivae normal.   Cardiovascular:      Rate and Rhythm: Normal rate and regular rhythm. Pulses: Normal pulses. Heart sounds: Normal heart sounds. Pulmonary:      Effort: Pulmonary effort is normal.      Breath sounds: Normal breath sounds. Comments: Questionable in expiratory and expiratory rhonchi in the right lung  Abdominal:      General: Bowel sounds are normal.   Musculoskeletal:      Right lower leg: No edema. Left lower leg: No edema. Neurological:      General: No focal deficit present. Mental Status: He is alert and oriented to person, place, and time.    Psychiatric:         Mood and Affect: Mood normal.         Behavior: Behavior normal.       CBC:   Lab Results   Component Value Date/Time    WBC 9.3 11/12/2022 08:34 AM    HGB 16.7 11/12/2022 08:34 AM    HCT 50.2 11/12/2022 08:34 AM     11/12/2022 08:34 AM     CMP:  Lab Results   Component Value Date/Time     12/21/2022 12:10 PM    K 4.4 12/21/2022 12:10 PM     12/21/2022 12:10 PM    CO2 27 12/21/2022 12:10 PM    ANIONGAP 10 12/21/2022 12:10 PM    GLUCOSE 59 12/21/2022 12:10 PM    BUN 19 12/21/2022 12:10 PM    CREATININE 1.2 12/21/2022 12:10 PM    GFRAA >60 09/20/2022 08:28 AM    CALCIUM 10.2 12/21/2022 12:10 PM    PROT 7.0 12/21/2022 12:10 PM    LABALBU 4.1 12/21/2022 12:10 PM    AGRATIO 1.4 12/21/2022 12:10 PM    BILITOT 0.4 12/21/2022 12:10 PM    ALKPHOS 90 12/21/2022 12:10 PM    ALT 18 12/21/2022 12:10 PM    AST 18 12/21/2022 12:10 PM     URINALYSIS:  Lab Results   Component Value Date/Time    GLUCOSEU Negative 05/19/2022 10:55 AM    KETUA Negative 05/19/2022 10:55 AM    SPECGRAV 1.021 05/19/2022 10:55 AM    BLOODU Negative 05/19/2022 10:55 AM    PHUR 5.5 05/19/2022 10:55 AM    PROTEINU Negative 05/19/2022 10:55 AM    NITRU Negative 05/19/2022 10:55 AM    LEUKOCYTESUR Negative 05/19/2022 10:55 AM    LABMICR Not Indicated 05/19/2022 10:55 AM    URINETYPE NotGiven 05/19/2022 10:55 AM     HBA1C:   Lab Results   Component Value Date/Time    LABA1C 5.8 11/01/2022 09:33 AM    .8 11/01/2022 09:33 AM     MICRO/ALB:   Lab Results   Component Value Date/Time    LABMICR Not Indicated 05/19/2022 10:55 AM     LIPID:  Lab Results   Component Value Date/Time    CHOL 201 05/19/2022 10:55 AM    TRIG 84 05/19/2022 10:55 AM    HDL 42 12/21/2022 12:10 PM    LDLCALC 149 12/21/2022 12:10 PM    LABVLDL 33 12/21/2022 12:10 PM     TSH:   Lab Results   Component Value Date/Time    TSHREFLEX 1.58 05/19/2022 10:55 AM     PSA:   Lab Results   Component Value Date/Time    PSA 1.33 05/19/2022 10:55 AM        ASSESSMENT/PLAN:  Assessment/Plan:  Jerrod Alpers was seen today for 3 month follow-up and immunizations. Diagnoses and all orders for this visit:    Primary hypertension  Cardiomyopathy  Exertional dyspnea  Patient is on multiple medication for cardiomyopathy. He is also following up with the cardiologist.  -     TSH with Reflex;  Future    Need for shingles vaccine  -     Zoster, SHINGRIX, (18 yrs +), IM    Pneumonia of both lungs due to infectious organism, unspecified part of lung  We will repeat chest x-ray. -     XR CHEST (2 VW); Future    Ischemic cardiomyopathy  Other than exertional dyspnea and fatigue, symptom has been stable. Language barrier to communication    Other fatigue  -     Vitamin D 25 Hydroxy; Future  -     TSH with Reflex; Future    Other orders  -     Tdap, BOOSTRIX, (age 8 yrs+), IM  -     albuterol sulfate HFA (VENTOLIN HFA) 108 (90 Base) MCG/ACT inhaler;  Inhale 2 puffs into the lungs 4 times daily as needed for Wheezing          Orders Placed This Encounter   Procedures    XR CHEST (2 VW)     Standing Status:   Future     Standing Expiration Date:   2/23/2024    Zoster, SHINGRIX, (18 yrs +), IM    Tdap, BOOSTRIX, (age 8 yrs+), IM    Vitamin D 25 Hydroxy     Standing Status:   Future     Number of Occurrences:   1     Standing Expiration Date:   2/23/2024    TSH with Reflex     Standing Status:   Future     Number of Occurrences:   1     Standing Expiration Date:   2/23/2024     Current Outpatient Medications   Medication Sig Dispense Refill    albuterol sulfate HFA (VENTOLIN HFA) 108 (90 Base) MCG/ACT inhaler Inhale 2 puffs into the lungs 4 times daily as needed for Wheezing 18 g 1    carvedilol (COREG) 12.5 MG tablet Take 1 tablet by mouth 2 times daily (with meals) 180 tablet 3    sacubitril-valsartan (ENTRESTO) 49-51 MG per tablet Take 1 tablet by mouth 2 times daily (Patient taking differently: Take 1 tablet by mouth daily) 60 tablet 11    rosuvastatin (CRESTOR) 40 MG tablet Take 1 tablet by mouth daily 90 tablet 3    furosemide (LASIX) 40 MG tablet TAKE 1 TABLET BY MOUTH DAILY AS NEEDED FOR WEIGHT GAIN OR SHORTNESS OF BREATH OR SWELLING 90 tablet 2    clopidogrel (PLAVIX) 75 MG tablet Take 1 tablet by mouth daily 90 tablet 3    empagliflozin (JARDIANCE) 10 MG tablet Take 1 tablet by mouth daily 90 tablet 3    spironolactone (ALDACTONE) 25 MG tablet Take 1 tablet by mouth daily (Patient not taking: Reported on 2/23/2023) 90 tablet 5     No current facility-administered medications for this visit. Return in about 3 months (around 5/23/2023). An After Visit Summary was printed and given to the patient. Documentation was done using voice recognition dragon software. Every effort was made to ensure accuracy; however, inadvertent  Unintentional computerized transcription errors may be present.

## 2023-02-24 DIAGNOSIS — E55.9 VITAMIN D DEFICIENCY: Primary | ICD-10-CM

## 2023-02-24 RX ORDER — ERGOCALCIFEROL 1.25 MG/1
50000 CAPSULE ORAL WEEKLY
Qty: 12 CAPSULE | Refills: 1 | Status: SHIPPED | OUTPATIENT
Start: 2023-02-24

## 2023-03-20 ENCOUNTER — HOSPITAL ENCOUNTER (EMERGENCY)
Age: 68
Discharge: HOME | End: 2023-03-20
Attending: EMERGENCY MEDICINE
Payer: COMMERCIAL

## 2023-03-20 ENCOUNTER — APPOINTMENT (OUTPATIENT)
Dept: GENERAL RADIOLOGY | Age: 68
End: 2023-03-20
Payer: COMMERCIAL

## 2023-03-20 VITALS
RESPIRATION RATE: 16 BRPM | HEIGHT: 67 IN | OXYGEN SATURATION: 98 % | BODY MASS INDEX: 33.25 KG/M2 | HEART RATE: 74 BPM | DIASTOLIC BLOOD PRESSURE: 88 MMHG | TEMPERATURE: 97.7 F | SYSTOLIC BLOOD PRESSURE: 148 MMHG | WEIGHT: 211.86 LBS

## 2023-03-20 DIAGNOSIS — J45.901 EXACERBATION OF ASTHMA, UNSPECIFIED ASTHMA SEVERITY, UNSPECIFIED WHETHER PERSISTENT: Primary | ICD-10-CM

## 2023-03-20 PROCEDURE — 71046 X-RAY EXAM CHEST 2 VIEWS: CPT

## 2023-03-20 PROCEDURE — 99283 EMERGENCY DEPT VISIT LOW MDM: CPT

## 2023-03-20 PROCEDURE — 6370000000 HC RX 637 (ALT 250 FOR IP): Performed by: EMERGENCY MEDICINE

## 2023-03-20 RX ORDER — IPRATROPIUM BROMIDE AND ALBUTEROL SULFATE 2.5; .5 MG/3ML; MG/3ML
1 SOLUTION RESPIRATORY (INHALATION) ONCE
Status: COMPLETED | OUTPATIENT
Start: 2023-03-20 | End: 2023-03-20

## 2023-03-20 RX ORDER — PREDNISONE 10 MG/1
60 TABLET ORAL DAILY
Qty: 24 TABLET | Refills: 0 | Status: SHIPPED | OUTPATIENT
Start: 2023-03-20 | End: 2023-03-24

## 2023-03-20 RX ORDER — AZITHROMYCIN 250 MG/1
TABLET, FILM COATED ORAL
Qty: 1 PACKET | Refills: 0 | Status: SHIPPED | OUTPATIENT
Start: 2023-03-20

## 2023-03-20 RX ORDER — PREDNISONE 20 MG/1
60 TABLET ORAL ONCE
Status: COMPLETED | OUTPATIENT
Start: 2023-03-20 | End: 2023-03-20

## 2023-03-20 RX ADMIN — IPRATROPIUM BROMIDE AND ALBUTEROL SULFATE 1 AMPULE: 2.5; .5 SOLUTION RESPIRATORY (INHALATION) at 14:21

## 2023-03-20 RX ADMIN — PREDNISONE 60 MG: 20 TABLET ORAL at 14:20

## 2023-03-20 ASSESSMENT — PAIN - FUNCTIONAL ASSESSMENT: PAIN_FUNCTIONAL_ASSESSMENT: 0-10

## 2023-03-20 ASSESSMENT — PAIN SCALES - GENERAL: PAINLEVEL_OUTOF10: 0

## 2023-03-20 NOTE — ED PROVIDER NOTES
Emergency Physician Note  [facility]    Pt Name: Sultana Callahan  MRN: [id number]  [place] 1955  Date of evaluation: 3/20/2023  Provider: Ramya Patricia MD  PCP: Roshan Barnett MD    Note Open Time: 2:47 PM EDT 3/20/23    Chief Complaint  Other (Pt. C/o phlegm in chest onset a week, using Albuterol inhaler, worse at night)       History of Present Illness  Sultana Callahan is a 74 y.o. male who presents to the ED for cough. Patient reports cough productive of yellow and white sputum. This been going on for about a week. He was told recently need to get a repeat chest x-ray did not obtain it as an outpatient. He denies any chest pain or fevers. History obtained through [place] language video  DAVID. Denies any vomiting or diarrhea. No congestion or sore throat. History from : Patient    Limitations to history : Language [place]    REVIEW OF SYSTEMS :      Review of Systems    Positives and Pertinent negatives as per HPI. Medications/allergies/medical/social/family history  I have reviewed the following from the nursing documentation:      Prior to Admission medications    Medication Sig Start Date End Date Taking?  Authorizing Provider   vitamin D (ERGOCALCIFEROL) 1.25 MG (51119 UT) CAPS capsule Take 1 capsule by mouth once a week 2/24/23   Rachael Herzog MD   albuterol sulfate HFA (VENTOLIN HFA) 108 (90 Base) MCG/ACT inhaler Inhale 2 puffs into the lungs 4 times daily as needed for Wheezing 2/23/23   Rachael Herzog MD   carvedilol (COREG) 12.5 MG tablet Take 1 tablet by mouth 2 times daily (with meals) 12/27/22   Priya Samano MD   sacubitril-valsartan (ENTRESTO) 49-51 MG per tablet Take 1 tablet by mouth 2 times daily  Patient taking differently: Take 1 tablet by mouth daily 12/21/22   Priya Samano MD   rosuvastatin (CRESTOR) 40 MG tablet Take 1 tablet by mouth daily 12/21/22   Priya Samano MD   furosemide (LASIX) 40 MG tablet TAKE 1

## 2023-04-24 RX ORDER — ALBUTEROL SULFATE 90 UG/1
4 AEROSOL, METERED RESPIRATORY (INHALATION) PRN
OUTPATIENT
Start: 2023-04-25

## 2023-04-24 RX ORDER — ONDANSETRON 2 MG/ML
8 INJECTION INTRAMUSCULAR; INTRAVENOUS
OUTPATIENT
Start: 2023-04-25

## 2023-04-24 RX ORDER — EPINEPHRINE 1 MG/ML
0.3 INJECTION, SOLUTION, CONCENTRATE INTRAVENOUS PRN
OUTPATIENT
Start: 2023-04-25

## 2023-04-24 RX ORDER — ACETAMINOPHEN 325 MG/1
650 TABLET ORAL
OUTPATIENT
Start: 2023-04-25

## 2023-04-24 RX ORDER — DIPHENHYDRAMINE HYDROCHLORIDE 50 MG/ML
50 INJECTION INTRAMUSCULAR; INTRAVENOUS
OUTPATIENT
Start: 2023-04-25

## 2023-04-24 RX ORDER — SODIUM CHLORIDE 9 MG/ML
INJECTION, SOLUTION INTRAVENOUS CONTINUOUS
OUTPATIENT
Start: 2023-04-25

## 2023-04-25 ENCOUNTER — HOSPITAL ENCOUNTER (OUTPATIENT)
Dept: INFUSION THERAPY | Age: 68
Setting detail: INFUSION SERIES
Discharge: HOME | End: 2023-04-25
Payer: COMMERCIAL

## 2023-04-25 VITALS
DIASTOLIC BLOOD PRESSURE: 79 MMHG | HEART RATE: 78 BPM | RESPIRATION RATE: 17 BRPM | TEMPERATURE: 98.1 F | HEIGHT: 67 IN | OXYGEN SATURATION: 98 % | BODY MASS INDEX: 32.18 KG/M2 | WEIGHT: 205 LBS | SYSTOLIC BLOOD PRESSURE: 137 MMHG

## 2023-04-25 DIAGNOSIS — I25.5 ISCHEMIC CARDIOMYOPATHY: Primary | ICD-10-CM

## 2023-04-25 PROCEDURE — 96372 THER/PROPH/DIAG INJ SC/IM: CPT

## 2023-04-25 PROCEDURE — 6360000002 HC RX W HCPCS: Performed by: INTERNAL MEDICINE

## 2023-04-25 RX ORDER — ALBUTEROL SULFATE 90 UG/1
4 AEROSOL, METERED RESPIRATORY (INHALATION) PRN
OUTPATIENT
Start: 2023-07-24

## 2023-04-25 RX ORDER — SODIUM CHLORIDE 9 MG/ML
INJECTION, SOLUTION INTRAVENOUS CONTINUOUS
OUTPATIENT
Start: 2023-07-24

## 2023-04-25 RX ORDER — ACETAMINOPHEN 325 MG/1
650 TABLET ORAL
OUTPATIENT
Start: 2023-07-24

## 2023-04-25 RX ORDER — ONDANSETRON 2 MG/ML
8 INJECTION INTRAMUSCULAR; INTRAVENOUS
OUTPATIENT
Start: 2023-07-24

## 2023-04-25 RX ORDER — DIPHENHYDRAMINE HYDROCHLORIDE 50 MG/ML
50 INJECTION INTRAMUSCULAR; INTRAVENOUS
OUTPATIENT
Start: 2023-07-24

## 2023-04-25 RX ORDER — EPINEPHRINE 1 MG/ML
0.3 INJECTION, SOLUTION, CONCENTRATE INTRAVENOUS PRN
OUTPATIENT
Start: 2023-07-24

## 2023-04-25 RX ADMIN — INCLISIRAN 284 MG: 284 INJECTION, SOLUTION SUBCUTANEOUS at 08:38

## 2023-04-25 NOTE — PROGRESS NOTES
Outpatient [address] ([address] [place] [address]) VISIT    NAME:  Sherrie Omalley  YOB: 1955  MEDICAL RECORD NUMBER:  [id number]  Episode Date:  4/25/2023    Patient arrived to [facility]    [] per wheelchair   [x] ambulatory      /79   Pulse 78   Temp 98.1 °F (36.7 °C) (Oral)   Resp 17   Ht 5' 7\" (1.702 m)   Wt 205 lb (93 kg)   SpO2 98%   BMI 32.11 kg/m²     CLINICAL INFORMATION  Treatment Start Date:    ICD-10-CM Primary Diagnosis Code:   [] E78.0 Pure Hypercholesterolemia (including HeFH)   [] E78.2 Mixed Hyperlipidemia   []  E78.4 Other Hyperlipidemia   [] E78.5 Hyperlipidemia,    [] Unspecified Other:  ICD-10-CM Secondary Diagnosis Code: Has patient  with with diagnosis of Hyperlipidemia and ASCVD. Statin therapy?   and has not reached LDL-C target (<70 mg/dL)? [] Yes     [] No      Patient Active Problem List   Diagnosis    Ischemic cardiomyopathy    Nocturnal dyspnea    Gastroesophageal reflux disease with esophagitis without hemorrhage    Hiatal hernia    Coronary artery disease due to lipid rich plaque    Primary hypertension    Language barrier to communication    Pure hypercholesterolemia    Abnormal findings on esophagogastroduodenoscopy (EGD)    Cardiac defibrillator in situ    Symptomatic bradycardia    Sinus node dysfunction (HCC)    AV heart block       PAST MEDICAL HISTORY        Diagnosis Date    CAD (coronary artery disease)     Hyperlipidemia     Hypertension     Ischemic cardiomyopathy     MI (myocardial infarction) ([facility] [address]        Most recent LDL-C    What Cholesterol Lowering Medication is patient currently on: jose d Velázquez works in conjuction with diet and exercise. yes    Exercise Is patient trying to avoid foods high in fat like beef, pork, butter, cheese, whole milk, fried foods and baked goods? yes    Is patient trying to exercise more?     Has patient experienced any side effects: first dose   []

## 2023-05-17 ENCOUNTER — HOSPITAL ENCOUNTER (EMERGENCY)
Age: 68
Discharge: HOME | End: 2023-05-17
Attending: EMERGENCY MEDICINE
Payer: COMMERCIAL

## 2023-05-17 ENCOUNTER — APPOINTMENT (OUTPATIENT)
Dept: GENERAL RADIOLOGY | Age: 68
End: 2023-05-17
Payer: COMMERCIAL

## 2023-05-17 VITALS
RESPIRATION RATE: 16 BRPM | HEIGHT: 67 IN | WEIGHT: 212.3 LBS | HEART RATE: 78 BPM | SYSTOLIC BLOOD PRESSURE: 107 MMHG | OXYGEN SATURATION: 96 % | DIASTOLIC BLOOD PRESSURE: 70 MMHG | BODY MASS INDEX: 33.32 KG/M2 | TEMPERATURE: 97.8 F

## 2023-05-17 DIAGNOSIS — J45.21 MILD INTERMITTENT ASTHMA WITH EXACERBATION: Primary | ICD-10-CM

## 2023-05-17 LAB — SARS-COV-2 RDRP RESP QL NAA+PROBE: NOT DETECTED

## 2023-05-17 PROCEDURE — 6370000000 HC RX 637 (ALT 250 FOR IP): Performed by: EMERGENCY MEDICINE

## 2023-05-17 PROCEDURE — 99284 EMERGENCY DEPT VISIT MOD MDM: CPT

## 2023-05-17 PROCEDURE — 71045 X-RAY EXAM CHEST 1 VIEW: CPT

## 2023-05-17 PROCEDURE — 87635 SARS-COV-2 COVID-19 AMP PRB: CPT

## 2023-05-17 RX ORDER — IPRATROPIUM BROMIDE AND ALBUTEROL SULFATE 2.5; .5 MG/3ML; MG/3ML
1 SOLUTION RESPIRATORY (INHALATION) ONCE
Status: COMPLETED | OUTPATIENT
Start: 2023-05-17 | End: 2023-05-17

## 2023-05-17 RX ORDER — ALBUTEROL SULFATE 90 UG/1
2 AEROSOL, METERED RESPIRATORY (INHALATION) 4 TIMES DAILY PRN
Qty: 18 G | Refills: 0 | Status: SHIPPED | OUTPATIENT
Start: 2023-05-17

## 2023-05-17 RX ORDER — PREDNISONE 50 MG/1
50 TABLET ORAL DAILY
Qty: 4 TABLET | Refills: 0 | Status: SHIPPED | OUTPATIENT
Start: 2023-05-17 | End: 2023-05-21

## 2023-05-17 RX ORDER — BENZONATATE 100 MG/1
100 CAPSULE ORAL ONCE
Status: COMPLETED | OUTPATIENT
Start: 2023-05-17 | End: 2023-05-17

## 2023-05-17 RX ORDER — PREDNISONE 20 MG/1
60 TABLET ORAL ONCE
Status: COMPLETED | OUTPATIENT
Start: 2023-05-17 | End: 2023-05-17

## 2023-05-17 RX ORDER — BENZONATATE 100 MG/1
100 CAPSULE ORAL 2 TIMES DAILY PRN
Qty: 14 CAPSULE | Refills: 0 | Status: SHIPPED | OUTPATIENT
Start: 2023-05-17 | End: 2023-05-24

## 2023-05-17 RX ADMIN — PREDNISONE 60 MG: 20 TABLET ORAL at 18:39

## 2023-05-17 RX ADMIN — BENZONATATE 100 MG: 100 CAPSULE ORAL at 17:49

## 2023-05-17 RX ADMIN — IPRATROPIUM BROMIDE AND ALBUTEROL SULFATE 1 AMPULE: 2.5; .5 SOLUTION RESPIRATORY (INHALATION) at 17:50

## 2023-05-17 ASSESSMENT — ENCOUNTER SYMPTOMS
BLOOD IN STOOL: 0
ABDOMINAL PAIN: 0
FACIAL SWELLING: 0
NAUSEA: 0
VOICE CHANGE: 0
SHORTNESS OF BREATH: 1
BACK PAIN: 0
STRIDOR: 0
COUGH: 1
WHEEZING: 1
COLOR CHANGE: 0
VOMITING: 0
TROUBLE SWALLOWING: 0
PHOTOPHOBIA: 0

## 2023-05-17 ASSESSMENT — PAIN SCALES - GENERAL
PAINLEVEL_OUTOF10: 0
PAINLEVEL_OUTOF10: 0

## 2023-05-17 ASSESSMENT — PAIN - FUNCTIONAL ASSESSMENT: PAIN_FUNCTIONAL_ASSESSMENT: 0-10

## 2023-05-17 NOTE — ED NOTES
Used  [facility] #[id number]     Discharge and education instructions reviewed with patient. Teach-back successful. Pt verbalized understanding and signed d/c papers. Pt denied questions at this time. No acute distress noted. Patient instructed to follow-up as noted - return to emergency department if symptoms worsen. Patient verbalized understanding. Discharged per EDMD with discharge instructions. Pt discharged to private vehicle. Patient stable upon departure. Thanked patient for choosing [facility]) for care.          Rm Miles RN  05/17/23 [id number]

## 2023-05-17 NOTE — ED PROVIDER NOTES
[facility]  eMERGENCY dEPARTMENT eNCOUnter      Pt Name: Cisco Reyes  MRN: [id number]  [place] 1955  Date of evaluation: 5/17/2023  Provider: Clementina Izquierdo MD    [address] [place] [address]       Chief Complaint   Patient presents with    Cough     C/o productive cough x 2 days          HISTORY OF PRESENT ILLNESS   (Location/Symptom, Timing/Onset, Context/Setting, Quality, Duration, Modifying Factors, Severity)  Note limiting factors. History obtained from: the patient via [facility] [place] [address] is a 74 y.o. male with hx of asthma, hypertension, and CAD who presents due to 2 days of cough productive of yellow sputum and wheezing. Patient denies any chest pain, hemoptysis, or leg swelling. Patient ports symptoms are similar to previous heart attack. Patient ports he thinks he can asthma exacerbation. HPI    Nursing Notes were reviewed. REVIEW OFSYSTEMS    (2-9 systems for level 4, 10 or more for level 5)     Review of Systems   Constitutional:  Negative for appetite change, fever and unexpected weight change. HENT:  Negative for facial swelling, trouble swallowing and voice change. Eyes:  Negative for photophobia and visual disturbance. Respiratory:  Positive for cough, shortness of breath and wheezing. Negative for stridor. Cardiovascular:  Negative for chest pain and palpitations. Gastrointestinal:  Negative for abdominal pain, blood in stool, nausea and vomiting. Genitourinary:  Negative for difficulty urinating and dysuria. Musculoskeletal:  Negative for back pain, gait problem and neck pain. Skin:  Negative for color change and wound. Neurological:  Negative for seizures, syncope and speech difficulty. Psychiatric/Behavioral:  Negative for self-injury and suicidal ideas. Except as noted above the remainder of the review of systems was reviewed and negative.        PAST MEDICAL HISTORY     Past Medical History:

## 2023-05-30 ENCOUNTER — NURSE ONLY (OUTPATIENT)
Dept: CARDIOLOGY CLINIC | Age: 68
End: 2023-05-30
Payer: COMMERCIAL

## 2023-05-30 DIAGNOSIS — I50.22 CHRONIC SYSTOLIC (CONGESTIVE) HEART FAILURE (HCC): ICD-10-CM

## 2023-05-30 DIAGNOSIS — Z95.810 CARDIAC DEFIBRILLATOR IN SITU: Primary | ICD-10-CM

## 2023-05-30 DIAGNOSIS — I49.5 SINUS NODE DYSFUNCTION (HCC): ICD-10-CM

## 2023-05-30 DIAGNOSIS — I25.5 ISCHEMIC CARDIOMYOPATHY: ICD-10-CM

## 2023-05-30 DIAGNOSIS — I44.30 AV HEART BLOCK: ICD-10-CM

## 2023-05-30 PROCEDURE — G2066 INTER DEVC REMOTE 30D: HCPCS | Performed by: NURSE PRACTITIONER

## 2023-05-30 PROCEDURE — 93297 REM INTERROG DEV EVAL ICPMS: CPT | Performed by: NURSE PRACTITIONER

## 2023-06-01 NOTE — PROGRESS NOTES
Remote transmission received from patient's dual chamber ICD monitor at home. Transmission shows normal sensing and pacing function. Noted NSVT (Coreg). Ap 2.0%   <0.1% (MVP On)  PVCs 30.7/hr    Optivol is within normal range. TriageF Heart Failure Risk Status on 30-May-2023 is Low*. End of 31-day monitoring period 5/30/23. NP will review. See interrogation under cardiology tab in the [facility] [address] field for more details. Will continue to monitor remotely.

## 2023-06-07 ENCOUNTER — TELEPHONE (OUTPATIENT)
Dept: CARDIOLOGY CLINIC | Age: 68
End: 2023-06-07

## 2023-06-07 NOTE — TELEPHONE ENCOUNTER
I do not see anything scanned into his chart that it was received and signed. Please notify and have her fax another copy and will place in his folder for signature.

## 2023-06-07 NOTE — TELEPHONE ENCOUNTER
Jenifer Cuevas from the [facility] called in this morning, she states she faxed over paperwork on 5/19 for a leqvio reimbursement form for Dr. Karin Sheridan to sign off on, she would like to know if it was received. She can be reached at [phone].

## 2023-06-07 NOTE — TELEPHONE ENCOUNTER
Nusrat Siu, to see if we can get the paperwork faxed over to us again.  Gave her the fax number and our office number

## 2023-06-19 NOTE — PROGRESS NOTES
[facility]      Cardiology Consult    Khanh Pi  [birth year]    June 21, 2023    Referring Physician: Louie Wilson MD  Reason for Referral: CAD, ischemic cardiomyopathy     CC: \"I have some shortness of breath. \"     HPI:  The patient is 74 y.o. male with a past medical history significant for hypertension, hyperlipidemia, ischemic cardiomyopathy, and coronary artery disease who presents today for management of heart failure. He was initially seen for a pre-operative risk assessment prior to a colonoscopy scheduled 6/27/22. A Ukrainian phone  was used during the visit today. He reported a history of a prior MI in 2020 while living in [facility] but states a coronary angiogram was not performed. He stated he would be treated with medical therapy. A prior stress test was completed 3/2021 showed a large fixed defect with an LVEF of 28%. He denied a prior echocardiogram. He reported a history of gastric ulcers and follows with GI. He completed an echocardiogram that showed a severely reduced LVEF at 25%. He was started medical therapy but unfortunately his repeat echocardiogram showed the EF had not recovered at 20-25%. He was referred to Dr. Maria D Adan and underwent ICD placement 11/21/22. Today, he is here for follow up. He reports worsening SEGAL recently and weight gain. He is not sure the weight gain is fluid as he says his diet has been poor too. He has started the Leviqo infusion and is tolerating well. He is scheduled for his second dose 7/27/2023. He has only been taking his Entresto once daily due to a misunderstanding and for unclear reasons also discontinued Aldactone. Patient denies exertional chest pain/pressure, dyspnea at rest, PND, orthopnea, palpitations, lightheadedness, changes in LE edema, and syncope.      Past Medical History:   Diagnosis Date    CAD (coronary artery disease)     Hyperlipidemia     Hypertension     Ischemic cardiomyopathy     MI (myocardial

## 2023-06-21 ENCOUNTER — OFFICE VISIT (OUTPATIENT)
Dept: CARDIOLOGY CLINIC | Age: 68
End: 2023-06-21
Payer: COMMERCIAL

## 2023-06-21 VITALS
OXYGEN SATURATION: 96 % | HEIGHT: 67 IN | HEART RATE: 74 BPM | SYSTOLIC BLOOD PRESSURE: 116 MMHG | WEIGHT: 216 LBS | BODY MASS INDEX: 33.9 KG/M2 | DIASTOLIC BLOOD PRESSURE: 78 MMHG

## 2023-06-21 DIAGNOSIS — I25.10 CORONARY ARTERY DISEASE INVOLVING NATIVE CORONARY ARTERY OF NATIVE HEART WITHOUT ANGINA PECTORIS: Primary | ICD-10-CM

## 2023-06-21 DIAGNOSIS — I50.22 CHRONIC SYSTOLIC HF (HEART FAILURE) (HCC): ICD-10-CM

## 2023-06-21 DIAGNOSIS — I10 ESSENTIAL HYPERTENSION: ICD-10-CM

## 2023-06-21 DIAGNOSIS — E78.2 MIXED HYPERLIPIDEMIA: ICD-10-CM

## 2023-06-21 PROCEDURE — 1036F TOBACCO NON-USER: CPT | Performed by: INTERNAL MEDICINE

## 2023-06-21 PROCEDURE — G8427 DOCREV CUR MEDS BY ELIG CLIN: HCPCS | Performed by: INTERNAL MEDICINE

## 2023-06-21 PROCEDURE — 1123F ACP DISCUSS/DSCN MKR DOCD: CPT | Performed by: INTERNAL MEDICINE

## 2023-06-21 PROCEDURE — 99214 OFFICE O/P EST MOD 30 MIN: CPT | Performed by: INTERNAL MEDICINE

## 2023-06-21 PROCEDURE — 3017F COLORECTAL CA SCREEN DOC REV: CPT | Performed by: INTERNAL MEDICINE

## 2023-06-21 PROCEDURE — 3074F SYST BP LT 130 MM HG: CPT | Performed by: INTERNAL MEDICINE

## 2023-06-21 PROCEDURE — G8417 CALC BMI ABV UP PARAM F/U: HCPCS | Performed by: INTERNAL MEDICINE

## 2023-06-21 PROCEDURE — 3078F DIAST BP <80 MM HG: CPT | Performed by: INTERNAL MEDICINE

## 2023-06-21 RX ORDER — SPIRONOLACTONE 25 MG/1
25 TABLET ORAL DAILY
Qty: 90 TABLET | Refills: 3 | Status: SHIPPED | OUTPATIENT
Start: 2023-06-21

## 2023-06-30 DIAGNOSIS — I25.10 CORONARY ARTERY DISEASE INVOLVING NATIVE CORONARY ARTERY OF NATIVE HEART WITHOUT ANGINA PECTORIS: ICD-10-CM

## 2023-06-30 DIAGNOSIS — I50.22 CHRONIC SYSTOLIC HF (HEART FAILURE) (HCC): ICD-10-CM

## 2023-06-30 LAB
ALBUMIN SERPL-MCNC: 4.6 G/DL (ref 3.4–5)
ALBUMIN/GLOB SERPL: 1.9 {RATIO} (ref 1.1–2.2)
ALP SERPL-CCNC: 101 U/L (ref 40–129)
ALT SERPL-CCNC: 20 U/L (ref 10–40)
ANION GAP SERPL CALCULATED.3IONS-SCNC: 10 MMOL/L (ref 3–16)
AST SERPL-CCNC: 19 U/L (ref 15–37)
BILIRUB SERPL-MCNC: 0.6 MG/DL (ref 0–1)
BUN SERPL-MCNC: 23 MG/DL (ref 7–20)
CALCIUM SERPL-MCNC: 9.8 MG/DL (ref 8.3–10.6)
CHLORIDE SERPL-SCNC: 101 MMOL/L (ref 99–110)
CO2 SERPL-SCNC: 24 MMOL/L (ref 21–32)
CREAT SERPL-MCNC: 1.4 MG/DL (ref 0.8–1.3)
DEPRECATED RDW RBC AUTO: 13.9 % (ref 12.4–15.4)
GFR SERPLBLD CREATININE-BSD FMLA CKD-EPI: 56 ML/MIN/{1.73_M2}
GLUCOSE SERPL-MCNC: 105 MG/DL (ref 70–99)
HCT VFR BLD AUTO: 47.7 % (ref 40.5–52.5)
HGB BLD-MCNC: 16.1 G/DL (ref 13.5–17.5)
MCH RBC QN AUTO: 30.7 PG (ref 26–34)
MCHC RBC AUTO-ENTMCNC: 33.8 G/DL (ref 31–36)
MCV RBC AUTO: 90.8 FL (ref 80–100)
NT-PROBNP SERPL-MCNC: 363 PG/ML (ref 0–124)
PLATELET # BLD AUTO: 187 K/UL (ref 135–450)
PMV BLD AUTO: 9 FL (ref 5–10.5)
POTASSIUM SERPL-SCNC: 4.7 MMOL/L (ref 3.5–5.1)
PROT SERPL-MCNC: 7 G/DL (ref 6.4–8.2)
RBC # BLD AUTO: 5.25 M/UL (ref 4.2–5.9)
SODIUM SERPL-SCNC: 135 MMOL/L (ref 136–145)
WBC # BLD AUTO: 8.1 K/UL (ref 4–11)

## 2023-07-13 ENCOUNTER — TELEPHONE (OUTPATIENT)
Dept: CARDIOLOGY CLINIC | Age: 68
End: 2023-07-13

## 2023-07-13 ENCOUNTER — HOSPITAL ENCOUNTER (EMERGENCY)
Age: 68
Discharge: HOME | End: 2023-07-13
Attending: EMERGENCY MEDICINE
Payer: COMMERCIAL

## 2023-07-13 VITALS
HEART RATE: 78 BPM | DIASTOLIC BLOOD PRESSURE: 79 MMHG | BODY MASS INDEX: 34.57 KG/M2 | OXYGEN SATURATION: 96 % | TEMPERATURE: 97.1 F | WEIGHT: 220.24 LBS | RESPIRATION RATE: 20 BRPM | HEIGHT: 67 IN | SYSTOLIC BLOOD PRESSURE: 117 MMHG

## 2023-07-13 DIAGNOSIS — E78.2 MIXED HYPERLIPIDEMIA: Primary | ICD-10-CM

## 2023-07-13 DIAGNOSIS — R22.0 MOUTH SWELLING: Primary | ICD-10-CM

## 2023-07-13 DIAGNOSIS — K04.7 DENTAL INFECTION: ICD-10-CM

## 2023-07-13 PROCEDURE — 99283 EMERGENCY DEPT VISIT LOW MDM: CPT

## 2023-07-13 RX ORDER — CLINDAMYCIN HYDROCHLORIDE 150 MG/1
300 CAPSULE ORAL 3 TIMES DAILY
Qty: 60 CAPSULE | Refills: 0 | Status: SHIPPED | OUTPATIENT
Start: 2023-07-13 | End: 2023-07-23

## 2023-07-13 ASSESSMENT — PAIN - FUNCTIONAL ASSESSMENT: PAIN_FUNCTIONAL_ASSESSMENT: 0-10

## 2023-07-13 ASSESSMENT — PAIN DESCRIPTION - PAIN TYPE: TYPE: ACUTE PAIN

## 2023-07-13 ASSESSMENT — PAIN DESCRIPTION - ORIENTATION: ORIENTATION: LEFT

## 2023-07-13 ASSESSMENT — PAIN DESCRIPTION - LOCATION: LOCATION: JAW

## 2023-07-13 ASSESSMENT — PAIN SCALES - GENERAL: PAINLEVEL_OUTOF10: 10

## 2023-07-13 NOTE — TELEPHONE ENCOUNTER
Spoke to the infusion center and patient needs an updated lipid profile prior to his next Leqvio. Order has been placed please let him know.

## 2023-07-13 NOTE — ED TRIAGE NOTES
Patient came to ER with complaints of swollen left lower jaw. No red areas. No open areas. No known injury. Patient complains of pain at a 10. Patient stated wants a work note till Monday.

## 2023-07-13 NOTE — ED NOTES
Discharge instructions reviewed. Patient verbalized understanding. All discharge instructions were gone over with . [address] called at [phone].      Roddy Capps RN  07/13/23 4881

## 2023-07-13 NOTE — ED PROVIDER NOTES
IMPRESSION  1. Mouth swelling    2. Dental infection        DISPOSITION  Decision To Discharge 07/13/2023 11:00:29 AM     Clarita Peacock MD    Note: This chart was created using voice recognition dictation software. Efforts were made by me to ensure accuracy, however some errors may be present due to limitations of this technology and occasionally words are not transcribed correctly.         Clarita Peacock MD  07/13/23 110

## 2023-07-13 NOTE — DISCHARGE INSTRUCTIONS
Dental Emergency Referrals    [address] [facility] residents only)    [facility] AT [place]  [address] [facility] ([place], [place]). [phone]   [address]  [phone]   [facility]  [place]  [phone]   [facility] AT [place]  (entrance on [address]. [facility]  [address] [facility]  [phone]   [facility]   [address]  [phone]   [facility]  [address].  [phone]       [address] offering [address] [facility]  [address] [facility].  [phone]   [facility].  [phone]     [facility] MEDICAL [place]  [address] 2nd floor  [phone]   [facility]  [address] [phone] ([address])  [place]: [address] [facility] Street: [address] [place]   [phone] [address] [facility]  [phone]     [facility]  [address] [facility] [place]  [phone]   [address] [place] [address]  [address]  [address] [facility]  [address] [facility].  Oral Surgery Dept: [phone]  Dental Clinic: [address]  [facility] [address] Floor  [phone]     [address]  [address]4Th Floor [phone]   [facility]   [address], [place], [address]  [place] : [address] [facility] [place] : [phone]     [facility]  [address] [facility] [address]    Other [address] in the area    [facility] (Dental Urgent Care)  [place]  (Across from [facility])  [address] [facility]  [phone]?       Pediatric Only Dentists    [address] [place] [address] Drive   Up to age 21  [facility]  Up to age 24  Simran:

## 2023-07-14 DIAGNOSIS — E78.2 MIXED HYPERLIPIDEMIA: ICD-10-CM

## 2023-07-14 NOTE — TELEPHONE ENCOUNTER
Called pt in regards to message below to confirm if he got message. Pt is Citizen of [place] speaking. Pt v/u and states he will try to go in today.

## 2023-07-15 LAB
CHOLEST SERPL-MCNC: 154 MG/DL (ref 0–199)
HDLC SERPL-MCNC: 53 MG/DL (ref 40–60)
LDL CHOLESTEROL CALCULATED: 78 MG/DL
TRIGL SERPL-MCNC: 115 MG/DL (ref 0–150)
VLDLC SERPL CALC-MCNC: 23 MG/DL

## 2023-07-18 ENCOUNTER — NURSE ONLY (OUTPATIENT)
Dept: CARDIOLOGY CLINIC | Age: 68
End: 2023-07-18
Payer: COMMERCIAL

## 2023-07-18 DIAGNOSIS — I25.5 ISCHEMIC CARDIOMYOPATHY: Primary | ICD-10-CM

## 2023-07-18 DIAGNOSIS — I50.22 CHRONIC SYSTOLIC HF (HEART FAILURE) (HCC): ICD-10-CM

## 2023-07-18 DIAGNOSIS — I25.10 CORONARY ARTERY DISEASE INVOLVING NATIVE CORONARY ARTERY OF NATIVE HEART WITHOUT ANGINA PECTORIS: ICD-10-CM

## 2023-07-18 DIAGNOSIS — Z95.810 CARDIAC DEFIBRILLATOR IN SITU: ICD-10-CM

## 2023-07-18 PROCEDURE — 93295 DEV INTERROG REMOTE 1/2/MLT: CPT | Performed by: INTERNAL MEDICINE

## 2023-07-18 PROCEDURE — 93296 REM INTERROG EVL PM/IDS: CPT | Performed by: INTERNAL MEDICINE

## 2023-07-18 PROCEDURE — 93297 REM INTERROG DEV EVAL ICPMS: CPT | Performed by: INTERNAL MEDICINE

## 2023-07-18 RX ORDER — CLOPIDOGREL BISULFATE 75 MG/1
75 TABLET ORAL DAILY
Qty: 90 TABLET | Refills: 3 | Status: SHIPPED | OUTPATIENT
Start: 2023-07-18

## 2023-07-27 ENCOUNTER — HOSPITAL ENCOUNTER (OUTPATIENT)
Dept: INFUSION THERAPY | Age: 68
Setting detail: INFUSION SERIES
Discharge: HOME | End: 2023-07-27
Payer: COMMERCIAL

## 2023-07-27 VITALS
RESPIRATION RATE: 16 BRPM | DIASTOLIC BLOOD PRESSURE: 78 MMHG | HEART RATE: 79 BPM | SYSTOLIC BLOOD PRESSURE: 124 MMHG | TEMPERATURE: 98.3 F

## 2023-07-27 DIAGNOSIS — I25.5 ISCHEMIC CARDIOMYOPATHY: Primary | ICD-10-CM

## 2023-07-27 PROCEDURE — 6360000002 HC RX W HCPCS: Performed by: INTERNAL MEDICINE

## 2023-07-27 PROCEDURE — 96372 THER/PROPH/DIAG INJ SC/IM: CPT

## 2023-07-27 RX ORDER — EPINEPHRINE 1 MG/ML
0.3 INJECTION, SOLUTION, CONCENTRATE INTRAVENOUS PRN
OUTPATIENT
Start: 2024-01-20

## 2023-07-27 RX ORDER — DIPHENHYDRAMINE HYDROCHLORIDE 50 MG/ML
50 INJECTION INTRAMUSCULAR; INTRAVENOUS
OUTPATIENT
Start: 2024-01-20

## 2023-07-27 RX ORDER — ACETAMINOPHEN 325 MG/1
650 TABLET ORAL
OUTPATIENT
Start: 2024-01-20

## 2023-07-27 RX ORDER — SODIUM CHLORIDE 9 MG/ML
INJECTION, SOLUTION INTRAVENOUS CONTINUOUS
OUTPATIENT
Start: 2024-01-20

## 2023-07-27 RX ORDER — ONDANSETRON 2 MG/ML
8 INJECTION INTRAMUSCULAR; INTRAVENOUS
OUTPATIENT
Start: 2024-01-20

## 2023-07-27 RX ORDER — ALBUTEROL SULFATE 90 UG/1
4 AEROSOL, METERED RESPIRATORY (INHALATION) PRN
OUTPATIENT
Start: 2024-01-20

## 2023-07-27 RX ADMIN — INCLISIRAN 284 MG: 284 INJECTION, SOLUTION SUBCUTANEOUS at 08:33

## 2023-07-27 NOTE — DISCHARGE INSTRUCTIONS
Outpatient Infusion Discharge Instructions  [facility]  [address], [address] [place] [address] Drive  Telephone: [phone]    NAME:  Ammon Spatz OF BIRTH:  1955  MEDICAL RECORD NUMBER:  [id number]  DATE:  @ED@    Reason for Outpatient Infusion Visit: ***    If you develop any these symptoms please contact you Doctor    [x] Nausea and/or vomiting not relieved with medication   [x] Swelling, redness, and/or bleeding at injection or IV site    [x] Fever or chills  [x] Rash or itching   [x] Shortness of breath  [x] Please review After Visit Summary (AVS) information on    [] Other      Outpatient [address] [facility],6Th Floor South: Should you experience any significant changes in your health or have questions about your care please contact the [address] [place] [address] St at [address] [place] [address] 8:00 am - 4:00 pm.  If you need help outside these hours and cannot wait until we are again available, contact your Primary Care Physician or go to the hospital emergency room.        Electronically signed by Yaima Hughes RN on 7/27/2023 at 8:33 AM

## 2023-08-21 PROBLEM — I50.22 CHRONIC SYSTOLIC HF (HEART FAILURE) (HCC): Status: ACTIVE | Noted: 2023-08-21

## 2023-08-21 NOTE — PROGRESS NOTES
[facility]   Electrophysiology      Date: 8/22/2023    Primary Cardiologist: Bert Herrera MD  PCP: Almaz Moser MD     Chief Complaint:   Chief Complaint   Patient presents with    Follow-up     History of Present Illness:    I saw Ivon Vasquez in the office for electrophysiology follow up today. He is a 74 y.o. male with a past medical history of gastric ulcers, hypertension, hyperlipidemia, ischemic cardiomyopathy, coronary artery disease and reported a history of a prior MI in 2020 while living in [place] . Stress test 3/2021 showed a large fixed defect with an LVEF of 28%. Echo on 7/25/2022 showed LVEF 20-25 % and repeat Echo after optimized guideline directed medical therapy 10/21/2022 showed LVEF remained depressed at 20-25%. He underwent Medtronic dual chamber ICD implantation on 11/21/22 with Dr. Alex Sumner. He presents today for device follow up. We did use an  for our visit. He has been doing well since his last follow-up and has been working out regularly without any significant limitations. He denies any dyspnea or chest pain. No palpitations. No edema. No syncope. Allergies: Allergies   Allergen Reactions    Pcn [Penicillins] Anaphylaxis    Aspirin Hives     Home Medications:  Prior to Visit Medications    Medication Sig Taking?  Authorizing Provider   clopidogrel (PLAVIX) 75 MG tablet Take 1 tablet by mouth daily Yes Bert Herrera MD   spironolactone (ALDACTONE) 25 MG tablet Take 1 tablet by mouth daily Yes Bert Herrera MD   sacubitril-valsartan (ENTRESTO) 49-51 MG per tablet Take 1 tablet by mouth 2 times daily Yes Bert Herrera MD   albuterol sulfate HFA (VENTOLIN HFA) 108 (90 Base) MCG/ACT inhaler Inhale 2 puffs into the lungs 4 times daily as needed for Wheezing Yes Shirin Pradhan MD   albuterol sulfate HFA (VENTOLIN HFA) 108 (90 Base) MCG/ACT inhaler Inhale 2 puffs into the lungs 4 times daily as needed for Wheezing Yes Almaz Moser MD

## 2023-08-22 ENCOUNTER — OFFICE VISIT (OUTPATIENT)
Dept: CARDIOLOGY CLINIC | Age: 68
End: 2023-08-22
Payer: COMMERCIAL

## 2023-08-22 ENCOUNTER — NURSE ONLY (OUTPATIENT)
Dept: CARDIOLOGY CLINIC | Age: 68
End: 2023-08-22

## 2023-08-22 VITALS
HEIGHT: 67 IN | OXYGEN SATURATION: 96 % | SYSTOLIC BLOOD PRESSURE: 128 MMHG | WEIGHT: 220.4 LBS | HEART RATE: 97 BPM | BODY MASS INDEX: 34.59 KG/M2 | DIASTOLIC BLOOD PRESSURE: 74 MMHG

## 2023-08-22 DIAGNOSIS — I10 ESSENTIAL HYPERTENSION: ICD-10-CM

## 2023-08-22 DIAGNOSIS — I44.30 AV HEART BLOCK: ICD-10-CM

## 2023-08-22 DIAGNOSIS — Z95.810 CARDIAC DEFIBRILLATOR IN SITU: Primary | ICD-10-CM

## 2023-08-22 DIAGNOSIS — I25.5 ISCHEMIC CARDIOMYOPATHY: ICD-10-CM

## 2023-08-22 DIAGNOSIS — I25.10 CORONARY ARTERY DISEASE INVOLVING NATIVE CORONARY ARTERY OF NATIVE HEART WITHOUT ANGINA PECTORIS: ICD-10-CM

## 2023-08-22 DIAGNOSIS — R00.1 SYMPTOMATIC BRADYCARDIA: ICD-10-CM

## 2023-08-22 DIAGNOSIS — I50.22 CHRONIC SYSTOLIC HF (HEART FAILURE) (HCC): ICD-10-CM

## 2023-08-22 DIAGNOSIS — I25.5 ISCHEMIC CARDIOMYOPATHY: Primary | ICD-10-CM

## 2023-08-22 DIAGNOSIS — Z95.810 CARDIAC DEFIBRILLATOR IN SITU: ICD-10-CM

## 2023-08-22 DIAGNOSIS — I49.5 SINUS NODE DYSFUNCTION (HCC): ICD-10-CM

## 2023-08-22 PROCEDURE — 93000 ELECTROCARDIOGRAM COMPLETE: CPT | Performed by: NURSE PRACTITIONER

## 2023-08-22 PROCEDURE — 3074F SYST BP LT 130 MM HG: CPT | Performed by: NURSE PRACTITIONER

## 2023-08-22 PROCEDURE — 99214 OFFICE O/P EST MOD 30 MIN: CPT | Performed by: NURSE PRACTITIONER

## 2023-08-22 PROCEDURE — 1123F ACP DISCUSS/DSCN MKR DOCD: CPT | Performed by: NURSE PRACTITIONER

## 2023-08-22 PROCEDURE — 1036F TOBACCO NON-USER: CPT | Performed by: NURSE PRACTITIONER

## 2023-08-22 PROCEDURE — G8417 CALC BMI ABV UP PARAM F/U: HCPCS | Performed by: NURSE PRACTITIONER

## 2023-08-22 PROCEDURE — 3017F COLORECTAL CA SCREEN DOC REV: CPT | Performed by: NURSE PRACTITIONER

## 2023-08-22 PROCEDURE — G8427 DOCREV CUR MEDS BY ELIG CLIN: HCPCS | Performed by: NURSE PRACTITIONER

## 2023-08-22 PROCEDURE — 3078F DIAST BP <80 MM HG: CPT | Performed by: NURSE PRACTITIONER

## 2023-08-22 ASSESSMENT — ENCOUNTER SYMPTOMS
SORE THROAT: 0
VOMITING: 0
TROUBLE SWALLOWING: 0
WHEEZING: 0
BACK PAIN: 0
COUGH: 0
NAUSEA: 0
CONSTIPATION: 0
BLOOD IN STOOL: 0
COLOR CHANGE: 0
SHORTNESS OF BREATH: 0
SINUS PRESSURE: 0
DIARRHEA: 0
ABDOMINAL PAIN: 0

## 2023-09-26 PROCEDURE — G2066 INTER DEVC REMOTE 30D: HCPCS | Performed by: NURSE PRACTITIONER

## 2023-09-26 PROCEDURE — 93297 REM INTERROG DEV EVAL ICPMS: CPT | Performed by: NURSE PRACTITIONER

## 2023-10-08 DIAGNOSIS — I25.5 ISCHEMIC CARDIOMYOPATHY: ICD-10-CM

## 2023-10-09 RX ORDER — EMPAGLIFLOZIN 10 MG/1
10 TABLET, FILM COATED ORAL EVERY MORNING
Qty: 90 TABLET | Refills: 3 | OUTPATIENT
Start: 2023-10-09

## 2023-10-09 RX ORDER — EMPAGLIFLOZIN 10 MG/1
10 TABLET, FILM COATED ORAL EVERY MORNING
Qty: 90 TABLET | Refills: 3 | Status: SHIPPED | OUTPATIENT
Start: 2023-10-09

## 2023-10-09 NOTE — TELEPHONE ENCOUNTER
Last OV: 8/22/23  Next OV: 12/20/23  Last refill: 8/31/22  #90  3 R/F  Most recent Labs: 6/30/23, 7/14/23  Last EKG (if needed): 8/22/23

## 2023-10-20 PROCEDURE — 93296 REM INTERROG EVL PM/IDS: CPT | Performed by: INTERNAL MEDICINE

## 2023-10-20 PROCEDURE — 93295 DEV INTERROG REMOTE 1/2/MLT: CPT | Performed by: INTERNAL MEDICINE

## 2023-10-28 DIAGNOSIS — J18.9 PNEUMONIA OF BOTH LUNGS DUE TO INFECTIOUS ORGANISM, UNSPECIFIED PART OF LUNG: ICD-10-CM

## 2023-10-28 DIAGNOSIS — I25.5 ISCHEMIC CARDIOMYOPATHY: ICD-10-CM

## 2023-10-30 RX ORDER — ALBUTEROL SULFATE 90 UG/1
2 AEROSOL, METERED RESPIRATORY (INHALATION) 4 TIMES DAILY PRN
Qty: 18 G | Refills: 1 | OUTPATIENT
Start: 2023-10-30

## 2023-11-02 ENCOUNTER — OFFICE VISIT (OUTPATIENT)
Dept: INTERNAL MEDICINE CLINIC | Age: 68
End: 2023-11-02
Payer: COMMERCIAL

## 2023-11-02 ENCOUNTER — HOSPITAL ENCOUNTER (OUTPATIENT)
Dept: GENERAL RADIOLOGY | Age: 68
Discharge: HOME | End: 2023-11-02
Payer: COMMERCIAL

## 2023-11-02 ENCOUNTER — HOSPITAL ENCOUNTER (OUTPATIENT)
Age: 68
Discharge: HOME | End: 2023-11-02
Payer: COMMERCIAL

## 2023-11-02 VITALS
DIASTOLIC BLOOD PRESSURE: 64 MMHG | BODY MASS INDEX: 34.77 KG/M2 | SYSTOLIC BLOOD PRESSURE: 126 MMHG | OXYGEN SATURATION: 98 % | HEART RATE: 73 BPM | WEIGHT: 222 LBS

## 2023-11-02 DIAGNOSIS — I25.5 ISCHEMIC CARDIOMYOPATHY: ICD-10-CM

## 2023-11-02 DIAGNOSIS — Z78.9 LANGUAGE BARRIER TO COMMUNICATION: ICD-10-CM

## 2023-11-02 DIAGNOSIS — R06.2 WHEEZING: Primary | ICD-10-CM

## 2023-11-02 DIAGNOSIS — J20.9 ACUTE BRONCHITIS, UNSPECIFIED ORGANISM: ICD-10-CM

## 2023-11-02 DIAGNOSIS — R06.2 WHEEZING: ICD-10-CM

## 2023-11-02 DIAGNOSIS — J45.901 REACTIVE AIRWAY DISEASE WITH ACUTE EXACERBATION, UNSPECIFIED ASTHMA SEVERITY, UNSPECIFIED WHETHER PERSISTENT: ICD-10-CM

## 2023-11-02 LAB
DEPRECATED RDW RBC AUTO: 14.1 % (ref 12.4–15.4)
HCT VFR BLD AUTO: 51.7 % (ref 40.5–52.5)
HGB BLD-MCNC: 17.4 G/DL (ref 13.5–17.5)
MCH RBC QN AUTO: 31 PG (ref 26–34)
MCHC RBC AUTO-ENTMCNC: 33.6 G/DL (ref 31–36)
MCV RBC AUTO: 92.3 FL (ref 80–100)
NT-PROBNP SERPL-MCNC: 273 PG/ML (ref 0–124)
PLATELET # BLD AUTO: 205 K/UL (ref 135–450)
PMV BLD AUTO: 8.9 FL (ref 5–10.5)
RBC # BLD AUTO: 5.6 M/UL (ref 4.2–5.9)
WBC # BLD AUTO: 8.4 K/UL (ref 4–11)

## 2023-11-02 PROCEDURE — G8484 FLU IMMUNIZE NO ADMIN: HCPCS | Performed by: INTERNAL MEDICINE

## 2023-11-02 PROCEDURE — G8427 DOCREV CUR MEDS BY ELIG CLIN: HCPCS | Performed by: INTERNAL MEDICINE

## 2023-11-02 PROCEDURE — 3078F DIAST BP <80 MM HG: CPT | Performed by: INTERNAL MEDICINE

## 2023-11-02 PROCEDURE — G8417 CALC BMI ABV UP PARAM F/U: HCPCS | Performed by: INTERNAL MEDICINE

## 2023-11-02 PROCEDURE — 1123F ACP DISCUSS/DSCN MKR DOCD: CPT | Performed by: INTERNAL MEDICINE

## 2023-11-02 PROCEDURE — 3017F COLORECTAL CA SCREEN DOC REV: CPT | Performed by: INTERNAL MEDICINE

## 2023-11-02 PROCEDURE — 99214 OFFICE O/P EST MOD 30 MIN: CPT | Performed by: INTERNAL MEDICINE

## 2023-11-02 PROCEDURE — 3074F SYST BP LT 130 MM HG: CPT | Performed by: INTERNAL MEDICINE

## 2023-11-02 PROCEDURE — 1036F TOBACCO NON-USER: CPT | Performed by: INTERNAL MEDICINE

## 2023-11-02 PROCEDURE — 71046 X-RAY EXAM CHEST 2 VIEWS: CPT

## 2023-11-02 RX ORDER — PREDNISONE 20 MG/1
20 TABLET ORAL 2 TIMES DAILY
Qty: 10 TABLET | Refills: 0 | Status: SHIPPED | OUTPATIENT
Start: 2023-11-02 | End: 2023-11-07

## 2023-11-02 RX ORDER — ALBUTEROL SULFATE 90 UG/1
2 AEROSOL, METERED RESPIRATORY (INHALATION) 4 TIMES DAILY PRN
Qty: 18 G | Refills: 1 | Status: SHIPPED | OUTPATIENT
Start: 2023-11-02

## 2023-11-02 RX ORDER — DOXYCYCLINE HYCLATE 100 MG
100 TABLET ORAL 2 TIMES DAILY
Qty: 20 TABLET | Refills: 0 | Status: SHIPPED | OUTPATIENT
Start: 2023-11-02 | End: 2023-11-12

## 2023-11-02 RX ORDER — GUAIFENESIN 600 MG/1
600 TABLET, EXTENDED RELEASE ORAL 2 TIMES DAILY
Qty: 30 TABLET | Refills: 0 | Status: SHIPPED | OUTPATIENT
Start: 2023-11-02 | End: 2023-11-17

## 2023-11-02 ASSESSMENT — ENCOUNTER SYMPTOMS
VOICE CHANGE: 0
SHORTNESS OF BREATH: 0
ABDOMINAL PAIN: 0
SORE THROAT: 1
VOMITING: 0
COUGH: 1
NAUSEA: 0
WHEEZING: 1
PHOTOPHOBIA: 0
TROUBLE SWALLOWING: 0

## 2023-11-02 NOTE — RESULT ENCOUNTER NOTE
No pneumonia or acute changes noted by radiologist in chest x-ray. Medication as prescribed.   Follow-up as advised

## 2023-11-02 NOTE — PROGRESS NOTES
Metabolic Panel     Standing Status:   Future     Number of Occurrences:   1     Standing Expiration Date:   11/2/2024    Brain Natriuretic Peptide     Standing Status:   Future     Number of Occurrences:   1     Standing Expiration Date:   11/2/2024     Current Outpatient Medications   Medication Sig Dispense Refill    albuterol sulfate HFA (VENTOLIN HFA) 108 (90 Base) MCG/ACT inhaler Inhale 2 puffs into the lungs 4 times daily as needed for Wheezing 18 g 1    doxycycline hyclate (VIBRA-TABS) 100 MG tablet Take 1 tablet by mouth 2 times daily for 10 days 20 tablet 0    predniSONE (DELTASONE) 20 MG tablet Take 1 tablet by mouth 2 times daily for 5 days 10 tablet 0    guaiFENesin (MUCINEX) 600 MG extended release tablet Take 1 tablet by mouth 2 times daily for 15 days 30 tablet 0    JARDIANCE 10 MG tablet TAKE 1 TABLET BY MOUTH IN THE MORNING 90 tablet 3    clopidogrel (PLAVIX) 75 MG tablet Take 1 tablet by mouth daily 90 tablet 3    spironolactone (ALDACTONE) 25 MG tablet Take 1 tablet by mouth daily 90 tablet 3    sacubitril-valsartan (ENTRESTO) 49-51 MG per tablet Take 1 tablet by mouth 2 times daily 60 tablet 11    vitamin D (ERGOCALCIFEROL) 1.25 MG (04007 UT) CAPS capsule Take 1 capsule by mouth once a week 12 capsule 1    carvedilol (COREG) 12.5 MG tablet Take 1 tablet by mouth 2 times daily (with meals) 180 tablet 3    rosuvastatin (CRESTOR) 40 MG tablet Take 1 tablet by mouth daily 90 tablet 3    furosemide (LASIX) 40 MG tablet TAKE 1 TABLET BY MOUTH DAILY AS NEEDED FOR WEIGHT GAIN OR SHORTNESS OF BREATH OR SWELLING 90 tablet 2    albuterol sulfate HFA (VENTOLIN HFA) 108 (90 Base) MCG/ACT inhaler Inhale 2 puffs into the lungs 4 times daily as needed for Wheezing 18 g 0     No current facility-administered medications for this visit. Return in about 4 days (around 11/6/2023). An After Visit Summary was printed and given to the patient.   Documentation was done using voice recognition dragon

## 2023-11-03 DIAGNOSIS — I25.5 ISCHEMIC CARDIOMYOPATHY: ICD-10-CM

## 2023-11-03 LAB
ANION GAP SERPL CALCULATED.3IONS-SCNC: 11 MMOL/L (ref 3–16)
BUN SERPL-MCNC: 27 MG/DL (ref 7–20)
CALCIUM SERPL-MCNC: 10.8 MG/DL (ref 8.3–10.6)
CHLORIDE SERPL-SCNC: 102 MMOL/L (ref 99–110)
CO2 SERPL-SCNC: 26 MMOL/L (ref 21–32)
CREAT SERPL-MCNC: 1.4 MG/DL (ref 0.8–1.3)
GFR SERPLBLD CREATININE-BSD FMLA CKD-EPI: 55 ML/MIN/{1.73_M2}
GLUCOSE SERPL-MCNC: 107 MG/DL (ref 70–99)
POTASSIUM SERPL-SCNC: 5.2 MMOL/L (ref 3.5–5.1)
SODIUM SERPL-SCNC: 139 MMOL/L (ref 136–145)

## 2023-11-03 RX ORDER — FUROSEMIDE 40 MG/1
TABLET ORAL
Qty: 90 TABLET | Refills: 0 | Status: SHIPPED | OUTPATIENT
Start: 2023-11-03

## 2023-11-08 ENCOUNTER — OFFICE VISIT (OUTPATIENT)
Dept: INTERNAL MEDICINE CLINIC | Age: 68
End: 2023-11-08
Payer: COMMERCIAL

## 2023-11-08 VITALS
SYSTOLIC BLOOD PRESSURE: 118 MMHG | WEIGHT: 219 LBS | DIASTOLIC BLOOD PRESSURE: 64 MMHG | BODY MASS INDEX: 34.3 KG/M2 | OXYGEN SATURATION: 97 % | HEART RATE: 72 BPM

## 2023-11-08 DIAGNOSIS — I25.5 ISCHEMIC CARDIOMYOPATHY: ICD-10-CM

## 2023-11-08 DIAGNOSIS — J45.901 REACTIVE AIRWAY DISEASE WITH ACUTE EXACERBATION, UNSPECIFIED ASTHMA SEVERITY, UNSPECIFIED WHETHER PERSISTENT: Primary | ICD-10-CM

## 2023-11-08 DIAGNOSIS — Z78.9 LANGUAGE BARRIER TO COMMUNICATION: ICD-10-CM

## 2023-11-08 DIAGNOSIS — I50.9 CHRONIC CONGESTIVE HEART FAILURE, UNSPECIFIED HEART FAILURE TYPE (HCC): ICD-10-CM

## 2023-11-08 PROCEDURE — G8417 CALC BMI ABV UP PARAM F/U: HCPCS | Performed by: INTERNAL MEDICINE

## 2023-11-08 PROCEDURE — G8484 FLU IMMUNIZE NO ADMIN: HCPCS | Performed by: INTERNAL MEDICINE

## 2023-11-08 PROCEDURE — 99214 OFFICE O/P EST MOD 30 MIN: CPT | Performed by: INTERNAL MEDICINE

## 2023-11-08 PROCEDURE — 1036F TOBACCO NON-USER: CPT | Performed by: INTERNAL MEDICINE

## 2023-11-08 PROCEDURE — G8427 DOCREV CUR MEDS BY ELIG CLIN: HCPCS | Performed by: INTERNAL MEDICINE

## 2023-11-08 PROCEDURE — 3074F SYST BP LT 130 MM HG: CPT | Performed by: INTERNAL MEDICINE

## 2023-11-08 PROCEDURE — 1123F ACP DISCUSS/DSCN MKR DOCD: CPT | Performed by: INTERNAL MEDICINE

## 2023-11-08 PROCEDURE — 3078F DIAST BP <80 MM HG: CPT | Performed by: INTERNAL MEDICINE

## 2023-11-08 PROCEDURE — 3017F COLORECTAL CA SCREEN DOC REV: CPT | Performed by: INTERNAL MEDICINE

## 2023-11-08 ASSESSMENT — ENCOUNTER SYMPTOMS
COUGH: 0
NAUSEA: 0
CHEST TIGHTNESS: 0
PHOTOPHOBIA: 0
VOMITING: 0
ABDOMINAL PAIN: 0
STRIDOR: 0
SHORTNESS OF BREATH: 0

## 2023-11-08 NOTE — PROGRESS NOTES
Cici Metzger  1955  male  74 y.o. SUBJECTIVE:       Chief Complaint   Patient presents with    Follow-up     4 day f/u feels a lot better        HPI:  Follow-up visit. Since last visit patient reports significant improvement of his cough, wheezing, shortness of breath. He took Lasix for total 5 days. He is using albuterol as needed. Patient denies any more night cough. He denies calf pain or leg swelling. Patient denies any exertional chest pain, dyspnea, palpitations, syncope, orthopnea, edema or paroxysmal nocturnal dyspnea. He has future appointment with cardiologist in December. Past Medical History:   Diagnosis Date    CAD (coronary artery disease)     Hyperlipidemia     Hypertension     Ischemic cardiomyopathy     MI (myocardial infarction) ([address])      Past Surgical History:   Procedure Laterality Date    CARDIAC DEFIBRILLATOR PLACEMENT       Social History     Socioeconomic History    Marital status: Single   Tobacco Use    Smoking status: Never    Smokeless tobacco: Never   Vaping Use    Vaping Use: Never used   Substance and Sexual Activity    Alcohol use: Never    Drug use: Never    Sexual activity: Not Currently     Social Determinants of Health     Financial Resource Strain: Low Risk  (2/23/2023)    Overall Financial Resource Strain (CARDIA)     Difficulty of Paying Living Expenses: Not hard at all   Food Insecurity: No Food Insecurity (2/23/2023)    Hunger Vital Sign     Worried About Running Out of Food in the Last Year: Never true     Ran Out of Food in the Last Year: Never true   Transportation Needs: Unknown (2/23/2023)    PRAPARE - Transportation     Lack of Transportation (Non-Medical): No   Housing Stability: Unknown (2/23/2023)    Housing Stability Vital Sign     Unstable Housing in the Last Year: No     Family History   Problem Relation Age of Onset    Diabetes Mother        Review of Systems   Constitutional:  Negative for diaphoresis and unexpected weight change.

## 2023-12-01 PROCEDURE — 93297 REM INTERROG DEV EVAL ICPMS: CPT | Performed by: NURSE PRACTITIONER

## 2023-12-01 PROCEDURE — G2066 INTER DEVC REMOTE 30D: HCPCS | Performed by: NURSE PRACTITIONER

## 2024-01-04 PROCEDURE — 93297 REM INTERROG DEV EVAL ICPMS: CPT | Performed by: NURSE PRACTITIONER

## 2024-01-07 DIAGNOSIS — I25.83 CORONARY ARTERY DISEASE DUE TO LIPID RICH PLAQUE: ICD-10-CM

## 2024-01-07 DIAGNOSIS — I25.5 ISCHEMIC CARDIOMYOPATHY: ICD-10-CM

## 2024-01-07 DIAGNOSIS — R06.00 NOCTURNAL DYSPNEA: ICD-10-CM

## 2024-01-07 DIAGNOSIS — I25.10 CORONARY ARTERY DISEASE DUE TO LIPID RICH PLAQUE: ICD-10-CM

## 2024-01-07 DIAGNOSIS — I10 PRIMARY HYPERTENSION: ICD-10-CM

## 2024-01-08 RX ORDER — CARVEDILOL 12.5 MG/1
12.5 TABLET ORAL 2 TIMES DAILY WITH MEALS
Qty: 180 TABLET | Refills: 3 | Status: SHIPPED | OUTPATIENT
Start: 2024-01-08

## 2024-01-17 DIAGNOSIS — E78.00 PURE HYPERCHOLESTEROLEMIA: Primary | ICD-10-CM

## 2024-01-17 RX ORDER — ACETAMINOPHEN 325 MG/1
650 TABLET ORAL
OUTPATIENT
Start: 2024-01-30

## 2024-01-17 RX ORDER — DIPHENHYDRAMINE HYDROCHLORIDE 50 MG/ML
50 INJECTION INTRAMUSCULAR; INTRAVENOUS
OUTPATIENT
Start: 2024-01-30

## 2024-01-17 RX ORDER — ALBUTEROL SULFATE 90 UG/1
4 AEROSOL, METERED RESPIRATORY (INHALATION) PRN
OUTPATIENT
Start: 2024-01-30

## 2024-01-17 RX ORDER — SODIUM CHLORIDE 9 MG/ML
INJECTION, SOLUTION INTRAVENOUS CONTINUOUS
OUTPATIENT
Start: 2024-01-30

## 2024-01-17 RX ORDER — ONDANSETRON 2 MG/ML
8 INJECTION INTRAMUSCULAR; INTRAVENOUS
OUTPATIENT
Start: 2024-01-30

## 2024-01-17 RX ORDER — EPINEPHRINE 1 MG/ML
0.3 INJECTION, SOLUTION, CONCENTRATE INTRAVENOUS PRN
OUTPATIENT
Start: 2024-01-30

## 2024-01-19 PROCEDURE — 93295 DEV INTERROG REMOTE 1/2/MLT: CPT | Performed by: INTERNAL MEDICINE

## 2024-01-19 PROCEDURE — 93296 REM INTERROG EVL PM/IDS: CPT | Performed by: INTERNAL MEDICINE

## 2024-01-20 ENCOUNTER — HOSPITAL ENCOUNTER (EMERGENCY)
Age: 69
Discharge: HOME | End: 2024-01-20
Attending: EMERGENCY MEDICINE
Payer: COMMERCIAL

## 2024-01-20 ENCOUNTER — APPOINTMENT (OUTPATIENT)
Dept: CT IMAGING | Age: 69
End: 2024-01-20
Payer: COMMERCIAL

## 2024-01-20 VITALS
SYSTOLIC BLOOD PRESSURE: 138 MMHG | HEART RATE: 87 BPM | BODY MASS INDEX: 36.67 KG/M2 | WEIGHT: 234.13 LBS | TEMPERATURE: 98.5 F | OXYGEN SATURATION: 100 % | DIASTOLIC BLOOD PRESSURE: 86 MMHG | RESPIRATION RATE: 20 BRPM

## 2024-01-20 DIAGNOSIS — M51.36 DEGENERATIVE DISC DISEASE, LUMBAR: ICD-10-CM

## 2024-01-20 DIAGNOSIS — M54.50 ACUTE RIGHT-SIDED LOW BACK PAIN WITHOUT SCIATICA: Primary | ICD-10-CM

## 2024-01-20 DIAGNOSIS — I70.0 AORTIC ATHEROSCLEROSIS (HCC): ICD-10-CM

## 2024-01-20 PROCEDURE — 99284 EMERGENCY DEPT VISIT MOD MDM: CPT

## 2024-01-20 PROCEDURE — 74176 CT ABD & PELVIS W/O CONTRAST: CPT

## 2024-01-20 PROCEDURE — 6370000000 HC RX 637 (ALT 250 FOR IP): Performed by: EMERGENCY MEDICINE

## 2024-01-20 RX ORDER — LIDOCAINE 4 G/G
1 PATCH TOPICAL ONCE
Status: DISCONTINUED | OUTPATIENT
Start: 2024-01-20 | End: 2024-01-20 | Stop reason: HOSPADM

## 2024-01-20 RX ORDER — ACETAMINOPHEN 325 MG/1
650 TABLET ORAL ONCE
Status: COMPLETED | OUTPATIENT
Start: 2024-01-20 | End: 2024-01-20

## 2024-01-20 RX ORDER — METHOCARBAMOL 500 MG/1
1500 TABLET, FILM COATED ORAL ONCE
Status: COMPLETED | OUTPATIENT
Start: 2024-01-20 | End: 2024-01-20

## 2024-01-20 RX ORDER — METHOCARBAMOL 500 MG/1
500-1000 TABLET, FILM COATED ORAL 3 TIMES DAILY PRN
Qty: 18 TABLET | Refills: 0 | Status: SHIPPED | OUTPATIENT
Start: 2024-01-20 | End: 2024-01-23

## 2024-01-20 RX ADMIN — ACETAMINOPHEN 650 MG: 325 TABLET ORAL at 14:39

## 2024-01-20 RX ADMIN — METHOCARBAMOL 1500 MG: 500 TABLET ORAL at 14:39

## 2024-01-20 ASSESSMENT — ENCOUNTER SYMPTOMS
BACK PAIN: 1
SHORTNESS OF BREATH: 0
CHEST TIGHTNESS: 0
ABDOMINAL PAIN: 0

## 2024-01-20 ASSESSMENT — PAIN SCALES - GENERAL
PAINLEVEL_OUTOF10: 6
PAINLEVEL_OUTOF10: 6

## 2024-01-20 ASSESSMENT — LIFESTYLE VARIABLES
HOW OFTEN DO YOU HAVE A DRINK CONTAINING ALCOHOL: NEVER
HOW MANY STANDARD DRINKS CONTAINING ALCOHOL DO YOU HAVE ON A TYPICAL DAY: PATIENT DOES NOT DRINK

## 2024-01-20 NOTE — DISCHARGE INSTRUCTIONS
Try over-the-counter Lidoderm patches as needed for pain.  Take Tylenol as needed for discomfort.  You can also try ibuprofen as needed.  Take Robaxin as needed for muscle relaxer to see if this helps.  Do not drive with this.    Follow-up with your primary doctor in 3 to 5 days for repeat assessment if pain persists.  You can also follow-up with neurosurgery if needed.    If you develop worsening back pain with new numbness or weakness in the legs, inability to walk, new onset chest pain or shortness of breath, or any other concerns over the next 12 to 24 hours, then return to the emergency department for further evaluation.

## 2024-01-20 NOTE — ED NOTES
Pt discharged at this time. Discharge instructions and medications reviewed,  Questions were answered. PT verbalized understanding. Using   VSS, Afebrile.  Follow up appointments were discussed.

## 2024-01-20 NOTE — ED PROVIDER NOTES
CAPS CAPSULE    Take 1 capsule by mouth once a week       ALLERGIES     Pcn [penicillins] and Aspirin    FAMILY HISTORY       Family History   Problem Relation Age of Onset    Diabetes Mother           SOCIAL HISTORY       Social History     Socioeconomic History    Marital status: Single   Tobacco Use    Smoking status: Never    Smokeless tobacco: Never   Vaping Use    Vaping Use: Never used   Substance and Sexual Activity    Alcohol use: Never    Drug use: Never    Sexual activity: Not Currently     Social Determinants of Health     Financial Resource Strain: Low Risk  (2/23/2023)    Overall Financial Resource Strain (CARDIA)     Difficulty of Paying Living Expenses: Not hard at all   Transportation Needs: Unknown (2/23/2023)    PRAPARE - Transportation     Lack of Transportation (Non-Medical): No   Housing Stability: Unknown (2/23/2023)    Housing Stability Vital Sign     Unstable Housing in the Last Year: No       SCREENINGS    Cathy Coma Scale  Eye Opening: Spontaneous  Best Verbal Response: Oriented  Best Motor Response: Obeys commands  Cathy Coma Scale Score: 15           PHYSICAL EXAM    (up to 7 for level 4, 8 or more for level 5)     ED Triage Vitals   BP Temp Temp src Pulse Resp SpO2 Height Weight   -- -- -- -- -- -- -- --       Physical Exam  Vitals and nursing note reviewed.   Constitutional:       General: He is awake. He is not in acute distress.     Appearance: Normal appearance. He is well-developed and well-groomed. He is obese. He is not ill-appearing, toxic-appearing or diaphoretic.   HENT:      Head: Normocephalic and atraumatic.      Right Ear: External ear normal.      Left Ear: External ear normal.      Nose: Nose normal.      Mouth/Throat:      Mouth: Mucous membranes are moist.   Eyes:      General: No scleral icterus.        Right eye: No discharge.         Left eye: No discharge.   Neck:      Trachea: Trachea and phonation normal. No tracheal deviation.   Cardiovascular:      Rate and  and his wife felt comfortable with this plan.  At this time, no concern for cauda equina syndrome and no need for MRI based on a very reassuring exam neurologically and improving while in the ED.    He was told not to drive if using Robaxin.    I was the primary provider for the patient.      The patient tolerated their visit well.   The patient and / or the family were informed of the results of any tests, a time was given to answer questions.    FINAL IMPRESSION      1. Acute right-sided low back pain without sciatica    2. Degenerative disc disease, lumbar    3. Aortic atherosclerosis (HCC)          DISPOSITION/PLAN   DISPOSITION Decision To Discharge 01/20/2024 03:16:59 PM-discharged in improved, stable condition      PATIENT REFERRED TO:  [facility]  [address] [facility] [address] [phone]  Go to   If symptoms worsen    ABA Herzog MD  [address] [facility] [id number] [phone]    In 3 days  For any other concerns with your low back pain    [facility]  [facility] [address] [phone]  Call in 1 week  As needed for persistent low back pain or symptoms      DISCHARGEMEDICATIONS:  Discharge Medication List as of 1/20/2024  3:38 PM        START taking these medications    Details   methocarbamol (ROBAXIN) 500 MG tablet Take 1-2 tablets by mouth 3 times daily as needed (low back pain), Disp-18 tablet, R-0Print             DISCONTINUED MEDICATIONS:  Discharge Medication List as of 1/20/2024  3:38 PM                 (Please note that portions of this note were completed with a voicerecognition program.  Efforts were made to edit the dictations but occasionally words are mis-transcribed.)    Uriah Masters MD (electronically signed)            Uriah Masters MD  01/22/24 8418

## 2024-01-22 ENCOUNTER — TELEPHONE (OUTPATIENT)
Dept: INFUSION THERAPY | Age: 69
End: 2024-01-22

## 2024-01-22 NOTE — TELEPHONE ENCOUNTER
Called and spoke with patient through [employer] for [place]ian interpretation. Reminded patient that he is scheduled for his Leqvio injection next week on Tuesday 1/30/24 @ 8:30 AM. Reminded patient that he needs blood drawn for fasting lipid panel prior to his appointment. Per  patient will have labs drawn tomorrow morning at [facility] and he is aware they are fasting labs.

## 2024-01-23 DIAGNOSIS — E78.00 PURE HYPERCHOLESTEROLEMIA: ICD-10-CM

## 2024-01-23 LAB
CHOLEST SERPL-MCNC: 172 MG/DL (ref 0–199)
HDLC SERPL-MCNC: 48 MG/DL (ref 40–60)
LDLC SERPL CALC-MCNC: 95 MG/DL
TRIGL SERPL-MCNC: 143 MG/DL (ref 0–150)
VLDLC SERPL CALC-MCNC: 29 MG/DL

## 2024-01-29 ENCOUNTER — TELEPHONE (OUTPATIENT)
Dept: CARDIOLOGY CLINIC | Age: 69
End: 2024-01-29

## 2024-01-29 NOTE — TELEPHONE ENCOUNTER
Infusion center calling letting us know that they need a new order for \" Leqvio 284 mg SQ. 2nd dose.' Due to the order has .  Patient is coming in tomorrow  and they would like the new order put in today.    Infusion number [phone]

## 2024-01-30 ENCOUNTER — HOSPITAL ENCOUNTER (OUTPATIENT)
Dept: INFUSION THERAPY | Age: 69
Setting detail: INFUSION SERIES
Discharge: HOME | End: 2024-01-30
Payer: COMMERCIAL

## 2024-01-30 VITALS
HEART RATE: 70 BPM | SYSTOLIC BLOOD PRESSURE: 115 MMHG | DIASTOLIC BLOOD PRESSURE: 71 MMHG | RESPIRATION RATE: 16 BRPM | TEMPERATURE: 98.5 F

## 2024-01-30 DIAGNOSIS — E78.00 PURE HYPERCHOLESTEROLEMIA: ICD-10-CM

## 2024-01-30 DIAGNOSIS — I25.5 ISCHEMIC CARDIOMYOPATHY: Primary | ICD-10-CM

## 2024-01-30 PROCEDURE — 96372 THER/PROPH/DIAG INJ SC/IM: CPT

## 2024-01-30 PROCEDURE — 6360000002 HC RX W HCPCS: Performed by: INTERNAL MEDICINE

## 2024-01-30 RX ORDER — DIPHENHYDRAMINE HYDROCHLORIDE 50 MG/ML
50 INJECTION INTRAMUSCULAR; INTRAVENOUS
Status: CANCELLED | OUTPATIENT
Start: 2024-10-26

## 2024-01-30 RX ORDER — ACETAMINOPHEN 325 MG/1
650 TABLET ORAL
Status: CANCELLED | OUTPATIENT
Start: 2024-10-26

## 2024-01-30 RX ORDER — ONDANSETRON 2 MG/ML
8 INJECTION INTRAMUSCULAR; INTRAVENOUS
Status: CANCELLED | OUTPATIENT
Start: 2024-10-26

## 2024-01-30 RX ORDER — ALBUTEROL SULFATE 90 UG/1
4 AEROSOL, METERED RESPIRATORY (INHALATION) PRN
Status: CANCELLED | OUTPATIENT
Start: 2024-10-26

## 2024-01-30 RX ORDER — EPINEPHRINE 1 MG/ML
0.3 INJECTION, SOLUTION, CONCENTRATE INTRAVENOUS PRN
Status: CANCELLED | OUTPATIENT
Start: 2024-10-26

## 2024-01-30 RX ORDER — SODIUM CHLORIDE 9 MG/ML
INJECTION, SOLUTION INTRAVENOUS CONTINUOUS
Status: CANCELLED | OUTPATIENT
Start: 2024-10-26

## 2024-01-30 RX ADMIN — INCLISIRAN 284 MG: 284 INJECTION, SOLUTION SUBCUTANEOUS at 08:24

## 2024-01-30 NOTE — PROGRESS NOTES
Outpatient Infusion Center  [facility]     LEQVIO (Inclisiran) VISIT    NAME:  Frank Guerrero  YOB: 1955  MEDICAL RECORD NUMBER:  [id number]  Episode Date:  1/30/2024    Patient arrived to Outpatient Infusion Center    [] per wheelchair   [x] ambulatory      /71   Pulse 70   Temp 98.5 °F (36.9 °C) (Oral)   Resp 16     ICD-10-CM Primary Diagnosis Code:   [] E78.0 Pure Hypercholesterolemia (including HeFH)   [] E78.2 Mixed Hyperlipidemia   []  E78.4 Other Hyperlipidemia   [x] E78.5 Hyperlipidemia,    [] Unspecified Other:  ASCVD diagnosis:       Has patient reached LDL-C target (<70 mg/dL)?   [] Yes     [x] No      Patient Active Problem List   Diagnosis    Ischemic cardiomyopathy    Nocturnal dyspnea    Gastroesophageal reflux disease with esophagitis without hemorrhage    Hiatal hernia    Coronary artery disease involving native coronary artery of native heart without angina pectoris    Essential hypertension    Language barrier to communication    Pure hypercholesterolemia    Abnormal findings on esophagogastroduodenoscopy (EGD)    Cardiac defibrillator in situ    Symptomatic bradycardia    Sinus node dysfunction (HCC)    AV heart block    Chronic systolic HF (heart failure) (HCC)    Chronic congestive heart failure (HCC)       PAST MEDICAL HISTORY        Diagnosis Date    CAD (coronary artery disease)     Hyperlipidemia     Hypertension     Ischemic cardiomyopathy     MI (myocardial infarction) (HCC)        Most recent LDL-C    What Cholesterol Lowering Medication is patient currently on:Mae Kim works in conjuction with diet and exercise.    Exercise Is patient trying to avoid foods high in fat like beef, pork, butter, cheese, whole milk, fried foods and baked goods?    Is patient trying to exercise more?    Has patient experienced any side effects with last dose:   [] Injection site reaction like pain, redness or rash   [] Joint Pain, arthralgia   [] Urinary  Tract Infection   [] Diarrhea   [] Chest Cold, Bronchitis   [] Pain in legs or arms   [] SOB                    Response to treatment:  Well tolerated by patient.      Patient to follow up with [facility]    Electronically signed by Vivi Mora RN on 1/30/2024 at 8:25 AM

## 2024-01-30 NOTE — DISCHARGE INSTRUCTIONS
Outpatient Infusion Discharge Instructions  [facility]  [address] [facility] [address] [place], [place] [address]  Telephone: [phone]      FAX [phone]    NAME:  Frank Guerrero  YOB: 1955  MEDICAL RECORD NUMBER:  [id number]  DATE:  @ED@    Reason for Outpatient Infusion Visit: ***    If you develop any these symptoms please contact you Doctor    [] Nausea and/or vomiting not relieved with medication   [] Swelling, redness, and/or bleeding at injection or IV site    [] Fever or chills  [] Rash or itching   [] Shortness of breath  [] Please review After Visit Summary (AVS) information on    [] Other      Outpatient Infusion Center Information: Should you experience any significant changes in your health or have questions about your care please contact the [facility] at [phone] MONDAY - FRIDAY 8:00 am - 4:00 pm.  If you need help outside these hours and cannot wait until we are again available, contact your Primary Care Physician or go to the hospital emergency room.       Electronically signed by Vivi Mora RN on 1/30/2024 at 8:29 AM

## 2024-02-03 DIAGNOSIS — I25.5 ISCHEMIC CARDIOMYOPATHY: ICD-10-CM

## 2024-02-04 PROCEDURE — 93297 REM INTERROG DEV EVAL ICPMS: CPT | Performed by: NURSE PRACTITIONER

## 2024-02-05 RX ORDER — FUROSEMIDE 40 MG/1
TABLET ORAL
Qty: 90 TABLET | Refills: 0 | Status: SHIPPED | OUTPATIENT
Start: 2024-02-05

## 2024-02-15 ENCOUNTER — OFFICE VISIT (OUTPATIENT)
Dept: INTERNAL MEDICINE CLINIC | Age: 69
End: 2024-02-15
Payer: COMMERCIAL

## 2024-02-15 VITALS
BODY MASS INDEX: 35.55 KG/M2 | HEART RATE: 75 BPM | SYSTOLIC BLOOD PRESSURE: 124 MMHG | DIASTOLIC BLOOD PRESSURE: 64 MMHG | OXYGEN SATURATION: 96 % | WEIGHT: 227 LBS

## 2024-02-15 DIAGNOSIS — Z12.5 SCREENING FOR MALIGNANT NEOPLASM OF PROSTATE: ICD-10-CM

## 2024-02-15 DIAGNOSIS — I10 ESSENTIAL HYPERTENSION: ICD-10-CM

## 2024-02-15 DIAGNOSIS — I25.5 ISCHEMIC CARDIOMYOPATHY: Primary | ICD-10-CM

## 2024-02-15 DIAGNOSIS — I50.9 CHRONIC CONGESTIVE HEART FAILURE, UNSPECIFIED HEART FAILURE TYPE (HCC): ICD-10-CM

## 2024-02-15 DIAGNOSIS — R73.03 PREDIABETES: ICD-10-CM

## 2024-02-15 DIAGNOSIS — Z78.9 LANGUAGE BARRIER TO COMMUNICATION: ICD-10-CM

## 2024-02-15 DIAGNOSIS — J45.901 REACTIVE AIRWAY DISEASE WITH ACUTE EXACERBATION, UNSPECIFIED ASTHMA SEVERITY, UNSPECIFIED WHETHER PERSISTENT: ICD-10-CM

## 2024-02-15 LAB
ANION GAP SERPL CALCULATED.3IONS-SCNC: 13 MMOL/L (ref 3–16)
BUN SERPL-MCNC: 22 MG/DL (ref 7–20)
CALCIUM SERPL-MCNC: 9.5 MG/DL (ref 8.3–10.6)
CHLORIDE SERPL-SCNC: 100 MMOL/L (ref 99–110)
CO2 SERPL-SCNC: 25 MMOL/L (ref 21–32)
CREAT SERPL-MCNC: 1.4 MG/DL (ref 0.8–1.3)
GFR SERPLBLD CREATININE-BSD FMLA CKD-EPI: 55 ML/MIN/{1.73_M2}
GLUCOSE SERPL-MCNC: 91 MG/DL (ref 70–99)
POTASSIUM SERPL-SCNC: 4.8 MMOL/L (ref 3.5–5.1)
PSA SERPL DL<=0.01 NG/ML-MCNC: 1.1 NG/ML (ref 0–4)
SODIUM SERPL-SCNC: 138 MMOL/L (ref 136–145)

## 2024-02-15 PROCEDURE — 3074F SYST BP LT 130 MM HG: CPT | Performed by: INTERNAL MEDICINE

## 2024-02-15 PROCEDURE — G8484 FLU IMMUNIZE NO ADMIN: HCPCS | Performed by: INTERNAL MEDICINE

## 2024-02-15 PROCEDURE — 1123F ACP DISCUSS/DSCN MKR DOCD: CPT | Performed by: INTERNAL MEDICINE

## 2024-02-15 PROCEDURE — 1036F TOBACCO NON-USER: CPT | Performed by: INTERNAL MEDICINE

## 2024-02-15 PROCEDURE — G8417 CALC BMI ABV UP PARAM F/U: HCPCS | Performed by: INTERNAL MEDICINE

## 2024-02-15 PROCEDURE — 3078F DIAST BP <80 MM HG: CPT | Performed by: INTERNAL MEDICINE

## 2024-02-15 PROCEDURE — 99214 OFFICE O/P EST MOD 30 MIN: CPT | Performed by: INTERNAL MEDICINE

## 2024-02-15 PROCEDURE — G8427 DOCREV CUR MEDS BY ELIG CLIN: HCPCS | Performed by: INTERNAL MEDICINE

## 2024-02-15 PROCEDURE — 3017F COLORECTAL CA SCREEN DOC REV: CPT | Performed by: INTERNAL MEDICINE

## 2024-02-15 NOTE — PROGRESS NOTES
Frank Carlson Cesar  1955  male  68 y.o.    SUBJECTIVE:       Chief Complaint   Patient presents with    Follow-up     3 month        HPI:  Follow-up visit for chronic problems.  Patient denies any exertional chest pain, dyspnea, palpitations, syncope, orthopnea, edema or paroxysmal nocturnal dyspnea.  Patient has been going to the gym regularly.  He even does not feel exertional dyspnea.  Past Medical History:   Diagnosis Date    CAD (coronary artery disease)     Hyperlipidemia     Hypertension     Ischemic cardiomyopathy     MI (myocardial infarction) (HCC)      Past Surgical History:   Procedure Laterality Date    CARDIAC DEFIBRILLATOR PLACEMENT       Social History     Socioeconomic History    Marital status: Single   Tobacco Use    Smoking status: Never    Smokeless tobacco: Never   Vaping Use    Vaping Use: Never used   Substance and Sexual Activity    Alcohol use: Never    Drug use: Never    Sexual activity: Not Currently     Social Determinants of Health     Financial Resource Strain: Low Risk  (2/23/2023)    Overall Financial Resource Strain (CARDIA)     Difficulty of Paying Living Expenses: Not hard at all   Transportation Needs: Unknown (2/23/2023)    PRAPARE - Transportation     Lack of Transportation (Non-Medical): No   Housing Stability: Unknown (2/23/2023)    Housing Stability Vital Sign     Unstable Housing in the Last Year: No     Family History   Problem Relation Age of Onset    Diabetes Mother        Review of Systems   Constitutional:  Negative for diaphoresis and unexpected weight change.   Eyes:  Negative for photophobia and visual disturbance.   Respiratory:  Negative for cough, chest tightness, shortness of breath and stridor.    Cardiovascular:  Negative for chest pain, palpitations and leg swelling.   Gastrointestinal:  Negative for abdominal pain, nausea and vomiting.   Neurological:  Negative for dizziness and light-headedness.       OBJECTIVE:  Pulse Readings from Last 4 Encounters:

## 2024-02-16 LAB
EST. AVERAGE GLUCOSE BLD GHB EST-MCNC: 116.9 MG/DL
HBA1C MFR BLD: 5.7 %

## 2024-02-16 NOTE — RESULT ENCOUNTER NOTE
Stable labs changes.  Mild chronic renal impairment which is stable.  Prediabetes also stable.  No change of medicine

## 2024-03-04 ENCOUNTER — HOSPITAL ENCOUNTER (EMERGENCY)
Age: 69
Discharge: HOME | End: 2024-03-04
Attending: EMERGENCY MEDICINE
Payer: COMMERCIAL

## 2024-03-04 ENCOUNTER — APPOINTMENT (OUTPATIENT)
Dept: GENERAL RADIOLOGY | Age: 69
End: 2024-03-04
Payer: COMMERCIAL

## 2024-03-04 VITALS
BODY MASS INDEX: 34.64 KG/M2 | RESPIRATION RATE: 20 BRPM | SYSTOLIC BLOOD PRESSURE: 127 MMHG | TEMPERATURE: 99.7 F | OXYGEN SATURATION: 95 % | HEART RATE: 101 BPM | DIASTOLIC BLOOD PRESSURE: 88 MMHG | WEIGHT: 220.68 LBS | HEIGHT: 67 IN

## 2024-03-04 DIAGNOSIS — J20.8 VIRAL BRONCHITIS: Primary | ICD-10-CM

## 2024-03-04 LAB
FLUAV RNA UPPER RESP QL NAA+PROBE: NEGATIVE
FLUBV AG NPH QL: NEGATIVE
S PYO AG THROAT QL: NEGATIVE
SARS-COV-2 RDRP RESP QL NAA+PROBE: NOT DETECTED

## 2024-03-04 PROCEDURE — 87635 SARS-COV-2 COVID-19 AMP PRB: CPT

## 2024-03-04 PROCEDURE — 87804 INFLUENZA ASSAY W/OPTIC: CPT

## 2024-03-04 PROCEDURE — 6370000000 HC RX 637 (ALT 250 FOR IP): Performed by: EMERGENCY MEDICINE

## 2024-03-04 PROCEDURE — 99284 EMERGENCY DEPT VISIT MOD MDM: CPT

## 2024-03-04 PROCEDURE — 71046 X-RAY EXAM CHEST 2 VIEWS: CPT

## 2024-03-04 PROCEDURE — 87880 STREP A ASSAY W/OPTIC: CPT

## 2024-03-04 PROCEDURE — 87081 CULTURE SCREEN ONLY: CPT

## 2024-03-04 RX ORDER — ACETAMINOPHEN 500 MG
1000 TABLET ORAL ONCE
Status: COMPLETED | OUTPATIENT
Start: 2024-03-04 | End: 2024-03-04

## 2024-03-04 RX ORDER — PREDNISONE 20 MG/1
TABLET ORAL
Qty: 10 TABLET | Refills: 0 | Status: SHIPPED | OUTPATIENT
Start: 2024-03-04 | End: 2024-03-14

## 2024-03-04 RX ORDER — GUAIFENESIN 600 MG/1
600 TABLET, EXTENDED RELEASE ORAL 2 TIMES DAILY
Qty: 20 TABLET | Refills: 0 | Status: SHIPPED | OUTPATIENT
Start: 2024-03-04 | End: 2024-03-14

## 2024-03-04 RX ORDER — ALBUTEROL SULFATE 90 UG/1
2 AEROSOL, METERED RESPIRATORY (INHALATION) 4 TIMES DAILY PRN
Qty: 18 G | Refills: 0 | Status: SHIPPED | OUTPATIENT
Start: 2024-03-04

## 2024-03-04 RX ADMIN — ACETAMINOPHEN 1000 MG: 500 TABLET ORAL at 15:57

## 2024-03-04 ASSESSMENT — PAIN SCALES - GENERAL
PAINLEVEL_OUTOF10: 0
PAINLEVEL_OUTOF10: 0

## 2024-03-04 ASSESSMENT — PAIN - FUNCTIONAL ASSESSMENT: PAIN_FUNCTIONAL_ASSESSMENT: 0-10

## 2024-03-04 NOTE — ED NOTES
Discharge instructions reviewed with  Sierra [id number].  Patient verbalized understanding.  No questions at this time.  Patient has prescriptions x3 at pharmacy.

## 2024-03-04 NOTE — ED PROVIDER NOTES
[facility]  eMERGENCY dEPARTMENT eNCOUnter      Pt Name: Frank Guerrero  MRN: [id number]  Birthdate 1955  Date of evaluation: 3/4/2024  Provider: GRISEL OREILLY MD    CHIEF COMPLAINT       Chief Complaint   Patient presents with    Cough    Concern For COVID-19    Pharyngitis    Generalized Body Aches    Letter for School/Work         CRITICAL CARE TIME   Total Critical Care time was 0 minutes, excluding separately reportable procedures.  There was a high probability of clinically significant/life threatening deterioration in the patient's condition which required my urgent intervention.        HISTORY OF PRESENT ILLNESS  (Location/Symptom, Timing/Onset, Context/Setting, Quality, Duration, Modifying Factors, Severity.)   History From: Patient  Limitations to history : Language Iraqi    Frank Guerrero is a 68 y.o. male who presents to the emergency department complaining of being sick since Saturday.  He has had generalized bodyaches, sore throat, congestion, nonproductive cough.  No vomiting or diarrhea.  He states he was exposed to 2 people at work who had COVID.      Nursing Notes were reviewed and I agree.      SCREENINGS                         CIWA Assessment  BP: 127/88  Pulse: (!) 101           REVIEW OF SYSTEMS    (2-9 systems for level 4, 10 or more for level 5)     General: Body aches.  No documented fever.  HEENT: Sore throat and rhinorrhea.  No earache.  Cardiovascular: No chest pain.  Pulmonary: Nonproductive cough.  He states he feels a little bit short of breath when he lies down at night.  GI: No abdominal pain, vomiting, or diarrhea.  Musculoskeletal: Body aches.  Neuro: No headache or dizziness.    Except as noted above the remainder of the review of systems was reviewed and negative.       PAST MEDICAL HISTORY     Past Medical History:   Diagnosis Date    CAD (coronary artery disease)     Hyperlipidemia     Hypertension     Ischemic cardiomyopathy

## 2024-03-04 NOTE — ED TRIAGE NOTES
Patient came to ER with complaints of cough, sore thorat, lack of appetite, but drinking fluids ok.  General body aches.  Patient stated exposed to Covid at work.

## 2024-03-04 NOTE — DISCHARGE INSTRUCTIONS
Take prednisone daily as prescribed until completed.    Use albuterol every 6 hours as needed for coughing/wheezing/shortness of breath.    Mucinex 2 times daily for congestion.    Take Tylenol every 6 hours as needed for fever body aches.

## 2024-03-06 LAB — S PYO THROAT QL CULT: NORMAL

## 2024-04-05 ENCOUNTER — CLINICAL DOCUMENTATION (OUTPATIENT)
Facility: HOSPITAL | Age: 69
End: 2024-04-05

## 2024-04-05 NOTE — PROGRESS NOTES
Patient Assistance    Met with: No patient contact     Navigator Type: Infusion  Documentation Type: Assistance Review  Contact Type: No Contact  Navigation Status: Copay Enrollment  Status of Patient Insurance Coverage: Patient has active coverage          Additional notes: LEQVIO Copay Enrollment     Drug Name: OTHER  Other Drug Name: Leqvio  Form of PAP Assistance: Copay

## 2024-05-17 ENCOUNTER — OFFICE VISIT (OUTPATIENT)
Dept: INTERNAL MEDICINE CLINIC | Age: 69
End: 2024-05-17
Payer: COMMERCIAL

## 2024-05-17 VITALS
OXYGEN SATURATION: 96 % | HEIGHT: 67 IN | DIASTOLIC BLOOD PRESSURE: 76 MMHG | BODY MASS INDEX: 36.07 KG/M2 | SYSTOLIC BLOOD PRESSURE: 122 MMHG | HEART RATE: 74 BPM | WEIGHT: 229.8 LBS

## 2024-05-17 DIAGNOSIS — J45.901 REACTIVE AIRWAY DISEASE WITH ACUTE EXACERBATION, UNSPECIFIED ASTHMA SEVERITY, UNSPECIFIED WHETHER PERSISTENT: ICD-10-CM

## 2024-05-17 DIAGNOSIS — I10 ESSENTIAL HYPERTENSION: ICD-10-CM

## 2024-05-17 DIAGNOSIS — I50.9 CHRONIC CONGESTIVE HEART FAILURE, UNSPECIFIED HEART FAILURE TYPE (HCC): ICD-10-CM

## 2024-05-17 DIAGNOSIS — I25.5 ISCHEMIC CARDIOMYOPATHY: Primary | ICD-10-CM

## 2024-05-17 PROCEDURE — 3078F DIAST BP <80 MM HG: CPT | Performed by: INTERNAL MEDICINE

## 2024-05-17 PROCEDURE — 3017F COLORECTAL CA SCREEN DOC REV: CPT | Performed by: INTERNAL MEDICINE

## 2024-05-17 PROCEDURE — 1036F TOBACCO NON-USER: CPT | Performed by: INTERNAL MEDICINE

## 2024-05-17 PROCEDURE — 99214 OFFICE O/P EST MOD 30 MIN: CPT | Performed by: INTERNAL MEDICINE

## 2024-05-17 PROCEDURE — 1123F ACP DISCUSS/DSCN MKR DOCD: CPT | Performed by: INTERNAL MEDICINE

## 2024-05-17 PROCEDURE — G8427 DOCREV CUR MEDS BY ELIG CLIN: HCPCS | Performed by: INTERNAL MEDICINE

## 2024-05-17 PROCEDURE — 3074F SYST BP LT 130 MM HG: CPT | Performed by: INTERNAL MEDICINE

## 2024-05-17 PROCEDURE — G8417 CALC BMI ABV UP PARAM F/U: HCPCS | Performed by: INTERNAL MEDICINE

## 2024-05-17 RX ORDER — ALBUTEROL SULFATE 90 UG/1
2 AEROSOL, METERED RESPIRATORY (INHALATION) 4 TIMES DAILY PRN
Qty: 18 G | Refills: 0 | Status: SHIPPED | OUTPATIENT
Start: 2024-05-17

## 2024-05-17 SDOH — ECONOMIC STABILITY: FOOD INSECURITY: WITHIN THE PAST 12 MONTHS, THE FOOD YOU BOUGHT JUST DIDN'T LAST AND YOU DIDN'T HAVE MONEY TO GET MORE.: NEVER TRUE

## 2024-05-17 SDOH — ECONOMIC STABILITY: FOOD INSECURITY: WITHIN THE PAST 12 MONTHS, YOU WORRIED THAT YOUR FOOD WOULD RUN OUT BEFORE YOU GOT MONEY TO BUY MORE.: NEVER TRUE

## 2024-05-17 SDOH — ECONOMIC STABILITY: INCOME INSECURITY: HOW HARD IS IT FOR YOU TO PAY FOR THE VERY BASICS LIKE FOOD, HOUSING, MEDICAL CARE, AND HEATING?: NOT HARD AT ALL

## 2024-05-17 ASSESSMENT — ENCOUNTER SYMPTOMS
PHOTOPHOBIA: 0
STRIDOR: 0
CHEST TIGHTNESS: 0
COUGH: 0
ABDOMINAL PAIN: 0
VOICE CHANGE: 0
VOMITING: 0
NAUSEA: 0

## 2024-05-17 NOTE — PROGRESS NOTES
Frank Guerrero  1955  male  68 y.o.    SUBJECTIVE:       Chief Complaint   Patient presents with    3 Month Follow-Up     3 month follow up, no other concerns at this time.       HPI:  Follow-up visit for chronic problems.  History of ischemic cardiomyopathy, CHF reactive airway disease hypertension prediabetes.  Patient denies any exertional chest pain, dyspnea, palpitations, syncope, orthopnea, edema or paroxysmal nocturnal dyspnea.  The patient denies cough, chest pain, dyspnea, wheezing or hemoptysis.  Patient is requesting refill of albuterol    Past Medical History:   Diagnosis Date    CAD (coronary artery disease)     Hyperlipidemia     Hypertension     Ischemic cardiomyopathy     MI (myocardial infarction) (HCC)      Past Surgical History:   Procedure Laterality Date    CARDIAC DEFIBRILLATOR PLACEMENT       Social History     Socioeconomic History    Marital status: Single     Spouse name: None    Number of children: None    Years of education: None    Highest education level: None   Tobacco Use    Smoking status: Never    Smokeless tobacco: Never   Vaping Use    Vaping Use: Never used   Substance and Sexual Activity    Alcohol use: Never    Drug use: Never    Sexual activity: Not Currently     Social Determinants of Health     Financial Resource Strain: Low Risk  (5/17/2024)    Overall Financial Resource Strain (CARDIA)     Difficulty of Paying Living Expenses: Not hard at all   Food Insecurity: No Food Insecurity (5/17/2024)    Hunger Vital Sign     Worried About Running Out of Food in the Last Year: Never true     Ran Out of Food in the Last Year: Never true   Transportation Needs: Unknown (5/17/2024)    PRAPARE - Transportation     Lack of Transportation (Non-Medical): No   Housing Stability: Unknown (5/17/2024)    Housing Stability Vital Sign     Unstable Housing in the Last Year: No     Family History   Problem Relation Age of Onset    Diabetes Mother        Review of Systems   Constitutional:

## 2024-05-17 NOTE — PROGRESS NOTES
Frank Guerrero  1955  male  68 y.o.    SUBJECTIVE:       Chief Complaint   Patient presents with    3 Month Follow-Up     3 month follow up, no other concerns at this time.       HPI:  ***    Past Medical History:   Diagnosis Date    CAD (coronary artery disease)     Hyperlipidemia     Hypertension     Ischemic cardiomyopathy     MI (myocardial infarction) (HCC)      Past Surgical History:   Procedure Laterality Date    CARDIAC DEFIBRILLATOR PLACEMENT       Social History     Socioeconomic History    Marital status: Single     Spouse name: None    Number of children: None    Years of education: None    Highest education level: None   Tobacco Use    Smoking status: Never    Smokeless tobacco: Never   Vaping Use    Vaping Use: Never used   Substance and Sexual Activity    Alcohol use: Never    Drug use: Never    Sexual activity: Not Currently     Social Determinants of Health     Financial Resource Strain: Low Risk  (5/17/2024)    Overall Financial Resource Strain (CARDIA)     Difficulty of Paying Living Expenses: Not hard at all   Food Insecurity: No Food Insecurity (5/17/2024)    Hunger Vital Sign     Worried About Running Out of Food in the Last Year: Never true     Ran Out of Food in the Last Year: Never true   Transportation Needs: Unknown (5/17/2024)    PRAPARE - Transportation     Lack of Transportation (Non-Medical): No   Housing Stability: Unknown (5/17/2024)    Housing Stability Vital Sign     Unstable Housing in the Last Year: No     Family History   Problem Relation Age of Onset    Diabetes Mother        Review of Systems    OBJECTIVE:  Pulse Readings from Last 4 Encounters:   05/17/24 74   03/04/24 (!) 101   02/15/24 75   01/30/24 70     Wt Readings from Last 4 Encounters:   05/17/24 104.2 kg (229 lb 12.8 oz)   03/04/24 100.1 kg (220 lb 10.9 oz)   02/15/24 103 kg (227 lb)   01/20/24 106.2 kg (234 lb 2.1 oz)     BP Readings from Last 4 Encounters:   05/17/24 122/76   03/04/24 127/88   02/15/24

## 2024-06-08 DIAGNOSIS — J45.901 REACTIVE AIRWAY DISEASE WITH ACUTE EXACERBATION, UNSPECIFIED ASTHMA SEVERITY, UNSPECIFIED WHETHER PERSISTENT: ICD-10-CM

## 2024-06-10 RX ORDER — ALBUTEROL SULFATE 90 UG/1
AEROSOL, METERED RESPIRATORY (INHALATION)
Qty: 6.7 G | Refills: 1 | Status: SHIPPED | OUTPATIENT
Start: 2024-06-10

## 2024-06-10 NOTE — TELEPHONE ENCOUNTER
Medication:   Requested Prescriptions     Pending Prescriptions Disp Refills    albuterol sulfate HFA (PROVENTIL;VENTOLIN;PROAIR) 108 (90 Base) MCG/ACT inhaler [Pharmacy Med Name: ALBUTEROL HFA INH (200 PUFFS) 6.7GM] 6.7 g      Sig: INHALE 2 PUFFS INTO THE LUNGS FOUR TIMES DAILY AS NEEDED FOR WHEEZING        Last Filled:  5/17/24    Patient Phone Number: [phone] (home)     Last appt: 5/17/2024   Next appt: 8/23/2024    Last OARRS:        No data to display

## 2024-06-24 NOTE — PROGRESS NOTES
[facility]      Cardiology Consult    Frank Guerrero  1955 June 26, 2024    Referring Physician: ABA Herzog MD  Reason for Referral: CAD, ischemic cardiomyopathy     CC: \"There is nothing new\"    HPI:  The patient is 68 y.o. male with a past medical history significant for hypertension, hyperlipidemia, ischemic cardiomyopathy, and coronary artery disease who presents today for management of heart failure. He was initially seen for a pre-operative risk assessment prior to a colonoscopy scheduled 6/27/22. He reported a history of a prior MI in 2020 while living in [place] but states a coronary angiogram was not performed. He stated he would be treated with medical therapy. A prior stress test was completed 3/2021 showed a large fixed defect with an LVEF of 28%. He denied a prior echocardiogram. He reported a history of gastric ulcers and follows with GI. He completed an echocardiogram that showed a severely reduced LVEF at 25%. He was started medical therapy but unfortunately his repeat echocardiogram showed the EF had not recovered at 20-25%. He was referred to Dr. Bunch and underwent ICD placement 11/21/22. The  phone is used it today's office visit. He presents for follow up and states that overall he is feeling well. He does report some SEGAL while at the gym, but denies any acute changes. His weight is stable and he denies any worsening lower extremity edema. He denies any chest pains or pressure. He reports medication compliance and is tolerating. He states his next Leqvio injection is 7/30/24. He denies any abnormal bleeding or bruising. He denies exertional chest pain/pressure, dyspnea at rest, worsening SEGAL, PND, orthopnea, palpitations, lightheadedness, weight changes, changes in LE edema, and syncope.    Past Medical History:   Diagnosis Date    CAD (coronary artery disease)     Hyperlipidemia     Hypertension     Ischemic cardiomyopathy     MI (myocardial

## 2024-06-26 ENCOUNTER — OFFICE VISIT (OUTPATIENT)
Dept: CARDIOLOGY CLINIC | Age: 69
End: 2024-06-26
Payer: COMMERCIAL

## 2024-06-26 VITALS
WEIGHT: 231 LBS | SYSTOLIC BLOOD PRESSURE: 116 MMHG | OXYGEN SATURATION: 95 % | DIASTOLIC BLOOD PRESSURE: 72 MMHG | HEART RATE: 77 BPM | BODY MASS INDEX: 36.26 KG/M2 | HEIGHT: 67 IN

## 2024-06-26 DIAGNOSIS — I10 ESSENTIAL HYPERTENSION: ICD-10-CM

## 2024-06-26 DIAGNOSIS — I50.22 CHRONIC SYSTOLIC CONGESTIVE HEART FAILURE (HCC): Primary | ICD-10-CM

## 2024-06-26 DIAGNOSIS — I25.10 CORONARY ARTERY DISEASE INVOLVING NATIVE CORONARY ARTERY OF NATIVE HEART WITHOUT ANGINA PECTORIS: ICD-10-CM

## 2024-06-26 DIAGNOSIS — Z95.810 CARDIAC DEFIBRILLATOR IN SITU: ICD-10-CM

## 2024-06-26 DIAGNOSIS — E78.5 HYPERLIPIDEMIA LDL GOAL <70: ICD-10-CM

## 2024-06-26 DIAGNOSIS — I25.5 ISCHEMIC CARDIOMYOPATHY: ICD-10-CM

## 2024-06-26 PROCEDURE — 1123F ACP DISCUSS/DSCN MKR DOCD: CPT | Performed by: INTERNAL MEDICINE

## 2024-06-26 PROCEDURE — 93000 ELECTROCARDIOGRAM COMPLETE: CPT | Performed by: INTERNAL MEDICINE

## 2024-06-26 PROCEDURE — G8427 DOCREV CUR MEDS BY ELIG CLIN: HCPCS | Performed by: INTERNAL MEDICINE

## 2024-06-26 PROCEDURE — 3074F SYST BP LT 130 MM HG: CPT | Performed by: INTERNAL MEDICINE

## 2024-06-26 PROCEDURE — G8417 CALC BMI ABV UP PARAM F/U: HCPCS | Performed by: INTERNAL MEDICINE

## 2024-06-26 PROCEDURE — 1036F TOBACCO NON-USER: CPT | Performed by: INTERNAL MEDICINE

## 2024-06-26 PROCEDURE — 3017F COLORECTAL CA SCREEN DOC REV: CPT | Performed by: INTERNAL MEDICINE

## 2024-06-26 PROCEDURE — 3078F DIAST BP <80 MM HG: CPT | Performed by: INTERNAL MEDICINE

## 2024-06-26 PROCEDURE — 99214 OFFICE O/P EST MOD 30 MIN: CPT | Performed by: INTERNAL MEDICINE

## 2024-06-26 RX ORDER — CLOPIDOGREL BISULFATE 75 MG/1
75 TABLET ORAL DAILY
Qty: 90 TABLET | Refills: 3 | Status: SHIPPED | OUTPATIENT
Start: 2024-06-26

## 2024-06-26 RX ORDER — SPIRONOLACTONE 25 MG/1
25 TABLET ORAL DAILY
Qty: 90 TABLET | Refills: 3 | Status: SHIPPED | OUTPATIENT
Start: 2024-06-26

## 2024-06-26 RX ORDER — CARVEDILOL 12.5 MG/1
12.5 TABLET ORAL 2 TIMES DAILY WITH MEALS
Qty: 180 TABLET | Refills: 3 | Status: SHIPPED | OUTPATIENT
Start: 2024-06-26

## 2024-06-26 RX ORDER — ROSUVASTATIN CALCIUM 40 MG/1
40 TABLET, COATED ORAL DAILY
Qty: 90 TABLET | Refills: 3 | Status: SHIPPED | OUTPATIENT
Start: 2024-06-26

## 2024-07-05 ENCOUNTER — TELEPHONE (OUTPATIENT)
Dept: INFUSION THERAPY | Age: 69
End: 2024-07-05

## 2024-07-05 DIAGNOSIS — E78.00 PURE HYPERCHOLESTEROLEMIA: Primary | ICD-10-CM

## 2024-07-05 DIAGNOSIS — E78.5 HYPERLIPIDEMIA, UNSPECIFIED HYPERLIPIDEMIA TYPE: Primary | ICD-10-CM

## 2024-07-05 RX ORDER — ONDANSETRON 2 MG/ML
8 INJECTION INTRAMUSCULAR; INTRAVENOUS
OUTPATIENT
Start: 2024-07-30

## 2024-07-05 RX ORDER — DIPHENHYDRAMINE HYDROCHLORIDE 50 MG/ML
50 INJECTION INTRAMUSCULAR; INTRAVENOUS
OUTPATIENT
Start: 2024-07-30

## 2024-07-05 RX ORDER — ACETAMINOPHEN 325 MG/1
650 TABLET ORAL
OUTPATIENT
Start: 2024-07-30

## 2024-07-05 RX ORDER — SODIUM CHLORIDE 9 MG/ML
INJECTION, SOLUTION INTRAVENOUS CONTINUOUS
OUTPATIENT
Start: 2024-07-30

## 2024-07-05 RX ORDER — ALBUTEROL SULFATE 90 UG/1
4 AEROSOL, METERED RESPIRATORY (INHALATION) PRN
OUTPATIENT
Start: 2024-07-30

## 2024-07-05 RX ORDER — EPINEPHRINE 1 MG/ML
0.3 INJECTION, SOLUTION, CONCENTRATE INTRAVENOUS PRN
OUTPATIENT
Start: 2024-07-30

## 2024-07-05 NOTE — TELEPHONE ENCOUNTER
Called and spoke with patient about his upcoming infusion appointment for Leqvio injection using  Services. Instructed patient that he needs to have fasting Lipid panel drawn prior to his appointment on 7/31/24. Per  patient verbalizes understanding.

## 2024-07-06 DIAGNOSIS — E78.00 PURE HYPERCHOLESTEROLEMIA: ICD-10-CM

## 2024-07-06 LAB
CHOLEST SERPL-MCNC: 178 MG/DL (ref 0–199)
HDLC SERPL-MCNC: 46 MG/DL (ref 40–60)
LDLC SERPL CALC-MCNC: 102 MG/DL
TRIGL SERPL-MCNC: 148 MG/DL (ref 0–150)
VLDLC SERPL CALC-MCNC: 30 MG/DL

## 2024-07-19 PROCEDURE — 93295 DEV INTERROG REMOTE 1/2/MLT: CPT | Performed by: INTERNAL MEDICINE

## 2024-07-19 PROCEDURE — 93296 REM INTERROG EVL PM/IDS: CPT | Performed by: INTERNAL MEDICINE

## 2024-07-31 ENCOUNTER — HOSPITAL ENCOUNTER (OUTPATIENT)
Dept: INFUSION THERAPY | Age: 69
Setting detail: INFUSION SERIES
Discharge: HOME | End: 2024-07-31
Payer: COMMERCIAL

## 2024-07-31 VITALS
DIASTOLIC BLOOD PRESSURE: 75 MMHG | TEMPERATURE: 98.7 F | RESPIRATION RATE: 16 BRPM | OXYGEN SATURATION: 94 % | SYSTOLIC BLOOD PRESSURE: 123 MMHG | HEART RATE: 76 BPM

## 2024-07-31 DIAGNOSIS — E78.5 HYPERLIPIDEMIA, UNSPECIFIED HYPERLIPIDEMIA TYPE: Primary | ICD-10-CM

## 2024-07-31 DIAGNOSIS — I25.10 CORONARY ARTERY DISEASE INVOLVING NATIVE CORONARY ARTERY OF NATIVE HEART WITHOUT ANGINA PECTORIS: ICD-10-CM

## 2024-07-31 DIAGNOSIS — E78.00 PURE HYPERCHOLESTEROLEMIA: ICD-10-CM

## 2024-07-31 DIAGNOSIS — I25.5 ISCHEMIC CARDIOMYOPATHY: ICD-10-CM

## 2024-07-31 PROCEDURE — 6360000002 HC RX W HCPCS: Performed by: INTERNAL MEDICINE

## 2024-07-31 PROCEDURE — 96372 THER/PROPH/DIAG INJ SC/IM: CPT

## 2024-07-31 RX ORDER — ALBUTEROL SULFATE 90 UG/1
4 AEROSOL, METERED RESPIRATORY (INHALATION) PRN
OUTPATIENT
Start: 2025-04-27

## 2024-07-31 RX ORDER — ACETAMINOPHEN 325 MG/1
650 TABLET ORAL
OUTPATIENT
Start: 2025-04-27

## 2024-07-31 RX ORDER — SODIUM CHLORIDE 9 MG/ML
INJECTION, SOLUTION INTRAVENOUS CONTINUOUS
OUTPATIENT
Start: 2025-04-27

## 2024-07-31 RX ORDER — DIPHENHYDRAMINE HYDROCHLORIDE 50 MG/ML
50 INJECTION INTRAMUSCULAR; INTRAVENOUS
OUTPATIENT
Start: 2025-04-27

## 2024-07-31 RX ORDER — EPINEPHRINE 1 MG/ML
0.3 INJECTION, SOLUTION, CONCENTRATE INTRAVENOUS PRN
OUTPATIENT
Start: 2025-04-27

## 2024-07-31 RX ORDER — ONDANSETRON 2 MG/ML
8 INJECTION INTRAMUSCULAR; INTRAVENOUS
OUTPATIENT
Start: 2025-04-27

## 2024-07-31 RX ADMIN — INCLISIRAN 284 MG: 284 INJECTION, SOLUTION SUBCUTANEOUS at 08:26

## 2024-07-31 NOTE — DISCHARGE INSTRUCTIONS
Outpatient Infusion Discharge Instructions  [facility]  [address] [place], [place] [address]  Telephone: [phone]      FAX [phone]    NAME:  Frank Guerrero  YOB: 1955  MEDICAL RECORD NUMBER:  [id number]  DATE:  @ED@    Reason for Outpatient Infusion Visit: Leqvio    If you develop any these symptoms please contact you Doctor    [] Nausea and/or vomiting not relieved with medication   [] Swelling, redness, and/or bleeding at injection or IV site    [] Fever or chills  [] Rash or itching   [] Shortness of breath  [] Please review After Visit Summary (AVS) information on    [] Other      Outpatient Infusion Center Information: Should you experience any significant changes in your health or have questions about your care please contact the [facility] at [phone] MONDAY - FRIDAY 8:00 am - 4:00 pm.  If you need help outside these hours and cannot wait until we are again available, contact your Primary Care Physician or go to the hospital emergency room.       Electronically signed by Vivi Mora RN on 7/31/2024 at 8:24 AM

## 2024-07-31 NOTE — PROGRESS NOTES
Outpatient Infusion Center  [facility]     LEQVIO (Inclisiran) VISIT    NAME:  Frank Guerrero  YOB: 1955  MEDICAL RECORD NUMBER:  [id number]  Episode Date:  7/31/2024    Patient arrived to Outpatient Infusion Center    [] per wheelchair   [x] ambulatory      /75   Pulse 76   Temp 98.7 °F (37.1 °C) (Oral)   Resp 16   SpO2 94%     ICD-10-CM Primary Diagnosis Code:   [] E78.0 Pure Hypercholesterolemia (including HeFH)   [] E78.2 Mixed Hyperlipidemia   []  E78.4 Other Hyperlipidemia   [x] E78.5 Hyperlipidemia,    [] Unspecified Other:  ASCVD diagnosis:       Has patient reached LDL-C target (<70 mg/dL)?   [] Yes     [x] No      Patient Active Problem List   Diagnosis    Ischemic cardiomyopathy    Nocturnal dyspnea    Gastroesophageal reflux disease with esophagitis without hemorrhage    Hiatal hernia    Coronary artery disease involving native coronary artery of native heart without angina pectoris    Essential hypertension    Language barrier to communication    Pure hypercholesterolemia    Abnormal findings on esophagogastroduodenoscopy (EGD)    Cardiac defibrillator in situ    Symptomatic bradycardia    Sinus node dysfunction (HCC)    AV heart block    Chronic systolic HF (heart failure) (HCC)    Chronic congestive heart failure (HCC)    Hyperlipemia       PAST MEDICAL HISTORY        Diagnosis Date    CAD (coronary artery disease)     Hyperlipidemia     Hypertension     Ischemic cardiomyopathy     MI (myocardial infarction) (HCC)        Most recent LDL-C 102    What Cholesterol Lowering Medication is patient currently on: Mae Kim works in conjuction with diet and exercise.    Exercise Is patient trying to avoid foods high in fat like beef, pork, butter, cheese, whole milk, fried foods and baked goods?    Is patient trying to exercise more?    Has patient experienced any side effects with last dose: none   [] Injection site reaction like pain, redness or rash    [] Joint Pain, arthralgia   [] Urinary Tract Infection   [] Diarrhea   [] Chest Cold, Bronchitis   [] Pain in legs or arms   [] SOB                    Response to treatment:  Well tolerated by patient.      Patient to follow up with 3 months Nov 1    Electronically signed by Vivi Mora RN on 7/31/2024 at 8:22 AM

## 2024-08-12 DIAGNOSIS — I25.10 CORONARY ARTERY DISEASE INVOLVING NATIVE CORONARY ARTERY OF NATIVE HEART WITHOUT ANGINA PECTORIS: ICD-10-CM

## 2024-08-12 RX ORDER — CLOPIDOGREL BISULFATE 75 MG/1
75 TABLET ORAL DAILY
Qty: 90 TABLET | Refills: 3 | OUTPATIENT
Start: 2024-08-12

## 2024-08-23 ENCOUNTER — OFFICE VISIT (OUTPATIENT)
Dept: INTERNAL MEDICINE CLINIC | Age: 69
End: 2024-08-23

## 2024-08-23 VITALS
HEART RATE: 71 BPM | WEIGHT: 231 LBS | OXYGEN SATURATION: 95 % | DIASTOLIC BLOOD PRESSURE: 82 MMHG | SYSTOLIC BLOOD PRESSURE: 124 MMHG | HEIGHT: 67 IN | BODY MASS INDEX: 36.26 KG/M2

## 2024-08-23 DIAGNOSIS — K21.9 GASTROESOPHAGEAL REFLUX DISEASE WITHOUT ESOPHAGITIS: ICD-10-CM

## 2024-08-23 DIAGNOSIS — R10.11 RUQ PAIN: ICD-10-CM

## 2024-08-23 DIAGNOSIS — K21.9 GASTROESOPHAGEAL REFLUX DISEASE WITHOUT ESOPHAGITIS: Primary | ICD-10-CM

## 2024-08-23 DIAGNOSIS — I10 ESSENTIAL HYPERTENSION: ICD-10-CM

## 2024-08-23 DIAGNOSIS — Z78.9 LANGUAGE BARRIER TO COMMUNICATION: ICD-10-CM

## 2024-08-23 DIAGNOSIS — R73.03 PREDIABETES: ICD-10-CM

## 2024-08-23 DIAGNOSIS — I50.9 CHRONIC CONGESTIVE HEART FAILURE, UNSPECIFIED HEART FAILURE TYPE (HCC): ICD-10-CM

## 2024-08-23 LAB
ALBUMIN SERPL-MCNC: 4.2 G/DL (ref 3.4–5)
ALBUMIN/GLOB SERPL: 1.6 {RATIO} (ref 1.1–2.2)
ALP SERPL-CCNC: 91 U/L (ref 40–129)
ALT SERPL-CCNC: 27 U/L (ref 10–40)
ANION GAP SERPL CALCULATED.3IONS-SCNC: 8 MMOL/L (ref 3–16)
AST SERPL-CCNC: 26 U/L (ref 15–37)
BILIRUB SERPL-MCNC: 0.7 MG/DL (ref 0–1)
BILIRUB UR QL STRIP.AUTO: NEGATIVE
BUN SERPL-MCNC: 23 MG/DL (ref 7–20)
CALCIUM SERPL-MCNC: 9.7 MG/DL (ref 8.3–10.6)
CHLORIDE SERPL-SCNC: 100 MMOL/L (ref 99–110)
CHOLEST SERPL-MCNC: 151 MG/DL (ref 0–199)
CLARITY UR: CLEAR
CO2 SERPL-SCNC: 27 MMOL/L (ref 21–32)
COLOR UR: YELLOW
CREAT SERPL-MCNC: 1.4 MG/DL (ref 0.8–1.3)
DEPRECATED RDW RBC AUTO: 14.4 % (ref 12.4–15.4)
EST. AVERAGE GLUCOSE BLD GHB EST-MCNC: 119.8 MG/DL
GFR SERPLBLD CREATININE-BSD FMLA CKD-EPI: 55 ML/MIN/{1.73_M2}
GLUCOSE SERPL-MCNC: 89 MG/DL (ref 70–99)
GLUCOSE UR STRIP.AUTO-MCNC: >=1000 MG/DL
HBA1C MFR BLD: 5.8 %
HCT VFR BLD AUTO: 49 % (ref 40.5–52.5)
HDLC SERPL-MCNC: 38 MG/DL (ref 40–60)
HGB BLD-MCNC: 16.5 G/DL (ref 13.5–17.5)
HGB UR QL STRIP.AUTO: NEGATIVE
KETONES UR STRIP.AUTO-MCNC: NEGATIVE MG/DL
LDLC SERPL CALC-MCNC: 78 MG/DL
LEUKOCYTE ESTERASE UR QL STRIP.AUTO: NEGATIVE
LIPASE SERPL-CCNC: 37 U/L (ref 13–60)
MCH RBC QN AUTO: 30.8 PG (ref 26–34)
MCHC RBC AUTO-ENTMCNC: 33.8 G/DL (ref 31–36)
MCV RBC AUTO: 91.2 FL (ref 80–100)
NITRITE UR QL STRIP.AUTO: NEGATIVE
PH UR STRIP.AUTO: 5.5 [PH] (ref 5–8)
PLATELET # BLD AUTO: 205 K/UL (ref 135–450)
PMV BLD AUTO: 8.8 FL (ref 5–10.5)
POTASSIUM SERPL-SCNC: 4.5 MMOL/L (ref 3.5–5.1)
PROT SERPL-MCNC: 6.9 G/DL (ref 6.4–8.2)
PROT UR STRIP.AUTO-MCNC: NEGATIVE MG/DL
RBC # BLD AUTO: 5.37 M/UL (ref 4.2–5.9)
SODIUM SERPL-SCNC: 135 MMOL/L (ref 136–145)
SP GR UR STRIP.AUTO: 1.02 (ref 1–1.03)
TRIGL SERPL-MCNC: 174 MG/DL (ref 0–150)
TSH SERPL DL<=0.005 MIU/L-ACNC: 2.6 UIU/ML (ref 0.27–4.2)
UA DIPSTICK W REFLEX MICRO PNL UR: ABNORMAL
URN SPEC COLLECT METH UR: ABNORMAL
UROBILINOGEN UR STRIP-ACNC: 1 E.U./DL
VLDLC SERPL CALC-MCNC: 35 MG/DL
WBC # BLD AUTO: 7.9 K/UL (ref 4–11)

## 2024-08-23 RX ORDER — PANTOPRAZOLE SODIUM 40 MG/1
40 TABLET, DELAYED RELEASE ORAL
Qty: 30 TABLET | Refills: 0 | Status: SHIPPED | OUTPATIENT
Start: 2024-08-23 | End: 2024-08-23

## 2024-08-23 RX ORDER — PANTOPRAZOLE SODIUM 40 MG/1
40 TABLET, DELAYED RELEASE ORAL
Qty: 90 TABLET | Refills: 0 | Status: SHIPPED | OUTPATIENT
Start: 2024-08-23

## 2024-08-23 ASSESSMENT — ENCOUNTER SYMPTOMS
RECTAL PAIN: 0
CONSTIPATION: 0
SHORTNESS OF BREATH: 0
ANAL BLEEDING: 0
WHEEZING: 0
PHOTOPHOBIA: 0
DIARRHEA: 0
BLOOD IN STOOL: 0

## 2024-08-23 NOTE — TELEPHONE ENCOUNTER
Last OV: 8/23/2024  Next OV: 9/27/2024    Next appointment due:around 9/20/2024     Last fill:8/23/24  Refills:0

## 2024-08-23 NOTE — PROGRESS NOTES
Status:   Future     Number of Occurrences:   1     Standing Expiration Date:   8/23/2025    Lipase     Standing Status:   Future     Number of Occurrences:   1     Standing Expiration Date:   8/23/2025     Current Outpatient Medications   Medication Sig Dispense Refill    pantoprazole (PROTONIX) 40 MG tablet TAKE 1 TABLET BY MOUTH EVERY MORNING BEFORE BREAKFAST 90 tablet 0    sacubitril-valsartan (ENTRESTO) 49-51 MG per tablet Take 1 tablet by mouth 2 times daily 180 tablet 3    carvedilol (COREG) 12.5 MG tablet Take 1 tablet by mouth 2 times daily (with meals) 180 tablet 3    spironolactone (ALDACTONE) 25 MG tablet Take 1 tablet by mouth daily 90 tablet 3    rosuvastatin (CRESTOR) 40 MG tablet Take 1 tablet by mouth daily 90 tablet 3    empagliflozin (JARDIANCE) 10 MG tablet Take 1 tablet by mouth every morning 90 tablet 3    clopidogrel (PLAVIX) 75 MG tablet Take 1 tablet by mouth daily 90 tablet 3    albuterol sulfate HFA (PROVENTIL;VENTOLIN;PROAIR) 108 (90 Base) MCG/ACT inhaler INHALE 2 PUFFS INTO THE LUNGS FOUR TIMES DAILY AS NEEDED FOR WHEEZING 6.7 g 1    furosemide (LASIX) 40 MG tablet TAKE 1 TABLET BY MOUTH DAILY AS NEEDED FOR WEIGHT GAIN OR SHORTNESS OF BREATH OR SWELLING 90 tablet 0    vitamin D (ERGOCALCIFEROL) 1.25 MG (30346 UT) CAPS capsule Take 1 capsule by mouth once a week (Patient not taking: Reported on 6/26/2024) 12 capsule 1     No current facility-administered medications for this visit.       Return in about 4 weeks (around 9/20/2024) for ruq pain, gas.  An After Visit Summary was printed and given to the patient.  Documentation was done using voice recognition dragon software.  Every effort was made to ensure accuracy; however, inadvertent  Unintentional computerized transcription errors may be present.

## 2024-08-25 NOTE — RESULT ENCOUNTER NOTE
Minor lab changes but is stable.  In addition to current medications, encourage diet, increase physical activity lifestyle changes and weight loss

## 2024-08-30 ENCOUNTER — HOSPITAL ENCOUNTER (OUTPATIENT)
Dept: ULTRASOUND IMAGING | Age: 69
Discharge: HOME | End: 2024-08-30
Payer: COMMERCIAL

## 2024-08-30 DIAGNOSIS — R10.11 RUQ PAIN: ICD-10-CM

## 2024-08-30 DIAGNOSIS — K21.9 GASTROESOPHAGEAL REFLUX DISEASE WITHOUT ESOPHAGITIS: ICD-10-CM

## 2024-08-30 PROCEDURE — 76705 ECHO EXAM OF ABDOMEN: CPT

## 2024-08-30 NOTE — RESULT ENCOUNTER NOTE
No evidence of gallstone or inflammation.  Keep medicine as prescribed and keep appointment as advised

## 2024-09-27 ENCOUNTER — OFFICE VISIT (OUTPATIENT)
Dept: INTERNAL MEDICINE CLINIC | Age: 69
End: 2024-09-27

## 2024-09-27 VITALS
HEIGHT: 67 IN | BODY MASS INDEX: 37.1 KG/M2 | DIASTOLIC BLOOD PRESSURE: 72 MMHG | OXYGEN SATURATION: 96 % | HEART RATE: 76 BPM | SYSTOLIC BLOOD PRESSURE: 112 MMHG | WEIGHT: 236.4 LBS

## 2024-09-27 DIAGNOSIS — K21.9 GASTROESOPHAGEAL REFLUX DISEASE WITHOUT ESOPHAGITIS: Primary | ICD-10-CM

## 2024-09-27 DIAGNOSIS — K21.9 GASTROESOPHAGEAL REFLUX DISEASE WITHOUT ESOPHAGITIS: ICD-10-CM

## 2024-09-27 DIAGNOSIS — R10.13 DYSPEPSIA: ICD-10-CM

## 2024-09-27 DIAGNOSIS — Z78.9 LANGUAGE BARRIER TO COMMUNICATION: ICD-10-CM

## 2024-09-27 RX ORDER — FAMOTIDINE 20 MG/1
20 TABLET, FILM COATED ORAL NIGHTLY PRN
Qty: 30 TABLET | Refills: 0 | Status: SHIPPED | OUTPATIENT
Start: 2024-09-27 | End: 2024-09-27

## 2024-09-27 RX ORDER — FAMOTIDINE 20 MG/1
TABLET, FILM COATED ORAL
Qty: 90 TABLET | Refills: 1 | Status: SHIPPED | OUTPATIENT
Start: 2024-09-27

## 2024-09-27 ASSESSMENT — ENCOUNTER SYMPTOMS
CONSTIPATION: 0
SHORTNESS OF BREATH: 0
NAUSEA: 0
BLOOD IN STOOL: 0
VOMITING: 0
ANAL BLEEDING: 0
PHOTOPHOBIA: 0
WHEEZING: 0

## 2024-10-16 ENCOUNTER — TELEPHONE (OUTPATIENT)
Dept: CARDIOLOGY CLINIC | Age: 69
End: 2024-10-16

## 2024-10-16 NOTE — TELEPHONE ENCOUNTER
CARDIAC CLEARANCE     What type of procedure are you having?  EGD    Which physician is performing your procedure?  DR. YEFRI MORGAN    When is your procedure scheduled?  10/24/24    Where are you having this procedure?  [facility]    Are you taking Blood Thinners?   If so what? (Name/dose/frequesncy)  clopidogrel (PLAVIX) 75 MG tablet     Does the surgeon want you to stop your blood thinner?  If so for how long?  5 DAYS PRIOR    Are you having any new or worsening cardiac symptoms?   N/A    Phone Number and Contact Name for Physicians office:  [phone]    Fax number to send information:  [phone]    Cardiac History:  Last office visit with Cardiologist:  06/2024    Cardiac Procedures:    Cardiac Testing:

## 2024-10-16 NOTE — TELEPHONE ENCOUNTER
Dr. Jaime, please review and advise for a cardiac clearance in Dr. Hines's absence. Thank you.     Patient will be undergoing an EGD on 10/24/24. Requesting Plavix to be held 5 days prior. History of CHF, EF 20-25% s/p ICD, CAD (no PCI). Last saw Dr. Hines on 6/26/24.

## 2024-10-24 ENCOUNTER — ANESTHESIA (OUTPATIENT)
Dept: ENDOSCOPY | Age: 69
End: 2024-10-24
Payer: COMMERCIAL

## 2024-10-24 ENCOUNTER — APPOINTMENT (OUTPATIENT)
Dept: ENDOSCOPY | Age: 69
End: 2024-10-24
Attending: INTERNAL MEDICINE
Payer: COMMERCIAL

## 2024-10-24 ENCOUNTER — HOSPITAL ENCOUNTER (OUTPATIENT)
Age: 69
Setting detail: OUTPATIENT SURGERY
Discharge: HOME | End: 2024-10-24
Attending: INTERNAL MEDICINE | Admitting: INTERNAL MEDICINE
Payer: COMMERCIAL

## 2024-10-24 ENCOUNTER — ANESTHESIA EVENT (OUTPATIENT)
Dept: ENDOSCOPY | Age: 69
End: 2024-10-24
Payer: COMMERCIAL

## 2024-10-24 VITALS
DIASTOLIC BLOOD PRESSURE: 84 MMHG | WEIGHT: 230 LBS | OXYGEN SATURATION: 99 % | TEMPERATURE: 97.2 F | HEIGHT: 67 IN | RESPIRATION RATE: 16 BRPM | SYSTOLIC BLOOD PRESSURE: 111 MMHG | HEART RATE: 85 BPM | BODY MASS INDEX: 36.1 KG/M2

## 2024-10-24 DIAGNOSIS — R10.13 DYSPEPSIA: ICD-10-CM

## 2024-10-24 LAB
GLUCOSE BLD-MCNC: 93 MG/DL (ref 70–99)
PERFORMED ON: NORMAL

## 2024-10-24 PROCEDURE — 2709999900 HC NON-CHARGEABLE SUPPLY: Performed by: INTERNAL MEDICINE

## 2024-10-24 PROCEDURE — 88305 TISSUE EXAM BY PATHOLOGIST: CPT

## 2024-10-24 PROCEDURE — 6360000002 HC RX W HCPCS

## 2024-10-24 PROCEDURE — 3700000000 HC ANESTHESIA ATTENDED CARE: Performed by: INTERNAL MEDICINE

## 2024-10-24 PROCEDURE — 88342 IMHCHEM/IMCYTCHM 1ST ANTB: CPT

## 2024-10-24 PROCEDURE — 3700000001 HC ADD 15 MINUTES (ANESTHESIA): Performed by: INTERNAL MEDICINE

## 2024-10-24 PROCEDURE — 7100000011 HC PHASE II RECOVERY - ADDTL 15 MIN: Performed by: INTERNAL MEDICINE

## 2024-10-24 PROCEDURE — 3609012400 HC EGD TRANSORAL BIOPSY SINGLE/MULTIPLE: Performed by: INTERNAL MEDICINE

## 2024-10-24 PROCEDURE — 7100000010 HC PHASE II RECOVERY - FIRST 15 MIN: Performed by: INTERNAL MEDICINE

## 2024-10-24 RX ORDER — PROPOFOL 10 MG/ML
INJECTION, EMULSION INTRAVENOUS
Status: DISCONTINUED | OUTPATIENT
Start: 2024-10-24 | End: 2024-10-24 | Stop reason: SDUPTHER

## 2024-10-24 RX ORDER — LIDOCAINE HYDROCHLORIDE 20 MG/ML
INJECTION, SOLUTION INTRAVENOUS
Status: DISCONTINUED | OUTPATIENT
Start: 2024-10-24 | End: 2024-10-24 | Stop reason: SDUPTHER

## 2024-10-24 RX ADMIN — PROPOFOL 50 MG: 10 INJECTION, EMULSION INTRAVENOUS at 08:29

## 2024-10-24 RX ADMIN — LIDOCAINE HYDROCHLORIDE 100 MG: 20 INJECTION, SOLUTION INTRAVENOUS at 08:29

## 2024-10-24 RX ADMIN — PROPOFOL 50 MG: 10 INJECTION, EMULSION INTRAVENOUS at 08:40

## 2024-10-24 RX ADMIN — PROPOFOL 50 MG: 10 INJECTION, EMULSION INTRAVENOUS at 08:37

## 2024-10-24 RX ADMIN — PROPOFOL 50 MG: 10 INJECTION, EMULSION INTRAVENOUS at 08:34

## 2024-10-24 ASSESSMENT — PAIN SCALES - GENERAL: PAINLEVEL_OUTOF10: 0

## 2024-10-24 ASSESSMENT — LIFESTYLE VARIABLES: SMOKING_STATUS: 0

## 2024-10-24 ASSESSMENT — PAIN - FUNCTIONAL ASSESSMENT: PAIN_FUNCTIONAL_ASSESSMENT: NONE - DENIES PAIN

## 2024-10-24 NOTE — ANESTHESIA POSTPROCEDURE EVALUATION
Department of Anesthesiology  Postprocedure Note    Patient: Frank Guerrero  MRN: [id number]  YOB: 1955  Date of evaluation: 10/24/2024    Procedure Summary       Date: 10/24/24 Room / Location: [place] [address] / [facility]    Anesthesia Start: 0824 Anesthesia Stop: 0847    Procedure: ESOPHAGOGASTRODUODENOSCOPY BIOPSY small bowel bx r/o celiac gastric bx eval gastritis and r/o HP gastric nodule bx Diagnosis:       Dyspepsia      (Dyspepsia [R10.13])    Surgeons: Yanira Hewitt MD Responsible Provider: Adiel Ya MD    Anesthesia Type: TIVA ASA Status: 3            Anesthesia Type: No value filed.    Matty Phase I: Matty Score: 10    Matty Phase II: Matty Score: 10    Anesthesia Post Evaluation    Patient location during evaluation: bedside  Level of consciousness: awake and alert  Airway patency: patent  Nausea & Vomiting: no vomiting  Cardiovascular status: blood pressure returned to baseline  Respiratory status: acceptable  Hydration status: euvolemic  Multimodal analgesia pain management approach  Pain management: adequate    No notable events documented.

## 2024-10-24 NOTE — H&P
Pre-operative History and Physical    Patient: Frank Guerrero  : 1955  Acct#:     History Obtained From:  patient    HISTORY OF PRESENT ILLNESS:    The patient is a 68 y.o. male who presents with dyspepsia    Past Medical History:        Diagnosis Date    CAD (coronary artery disease)     Hyperlipidemia     Hypertension     Ischemic cardiomyopathy     MI (myocardial infarction) (HCC)      Past Surgical History:        Procedure Laterality Date    CARDIAC DEFIBRILLATOR PLACEMENT       Medications Prior to Admission:   No current facility-administered medications on file prior to encounter.     Current Outpatient Medications on File Prior to Encounter   Medication Sig Dispense Refill    pantoprazole (PROTONIX) 40 MG tablet TAKE 1 TABLET BY MOUTH EVERY MORNING BEFORE BREAKFAST 90 tablet 0    sacubitril-valsartan (ENTRESTO) 49-51 MG per tablet Take 1 tablet by mouth 2 times daily 180 tablet 3    carvedilol (COREG) 12.5 MG tablet Take 1 tablet by mouth 2 times daily (with meals) 180 tablet 3    rosuvastatin (CRESTOR) 40 MG tablet Take 1 tablet by mouth daily 90 tablet 3    empagliflozin (JARDIANCE) 10 MG tablet Take 1 tablet by mouth every morning 90 tablet 3    albuterol sulfate HFA (PROVENTIL;VENTOLIN;PROAIR) 108 (90 Base) MCG/ACT inhaler INHALE 2 PUFFS INTO THE LUNGS FOUR TIMES DAILY AS NEEDED FOR WHEEZING 6.7 g 1    famotidine (PEPCID) 20 MG tablet TAKE 1 TABLET BY MOUTH EVERY NIGHT AS NEEDED FOR HEART BURN 90 tablet 1    spironolactone (ALDACTONE) 25 MG tablet Take 1 tablet by mouth daily (Patient not taking: Reported on 10/24/2024) 90 tablet 3    clopidogrel (PLAVIX) 75 MG tablet Take 1 tablet by mouth daily 90 tablet 3    furosemide (LASIX) 40 MG tablet TAKE 1 TABLET BY MOUTH DAILY AS NEEDED FOR WEIGHT GAIN OR SHORTNESS OF BREATH OR SWELLING 90 tablet 0    vitamin D (ERGOCALCIFEROL) 1.25 MG (38767 UT) CAPS capsule Take 1 capsule by mouth once a week (Patient not taking: Reported on

## 2024-10-24 NOTE — DISCHARGE INSTRUCTIONS
ENDOSCOPY DISCHARGE INSTRUCTIONS:    Call the physician that did your procedure for any questions or concern:    [facility]: [phone]  DR. CAL MORGAN        ACTIVITY:    There are potential side effects to the medications used for sedation and anesthesia during your procedure.  These include:  Dizziness or light-headedness, confusion or memory loss, delayed reaction times, loss of coordination, nausea and vomiting.  Because of your increased risk for injury, we ask that you observe the following precautions:  For the next 24 hours,  DO NOT operate an automobile, bicycle, motorcycle, , power tools or large equipment of any kind.  Do not drink alcohol, sign any legal documents or make any legal decisions for 24 hours.  Do not bend your head over lower than your heart.  DO sit on the side of bed/couch awhile before getting up.  Plan on bedrest or quiet relaxation today.  You may resume normal activities in 24 hours.    DIET:    Your first meal today should be light, avoiding spicy and fatty foods.  If you tolerate this first meal, then you may advance to your regular diet unless otherwise advised by your physician.    NORMAL SYMPTOMS:  -Mild sore throat if you’ve had an EGD   -Gaseous discomfort    NOTIFY YOUR PHYSICIAN IF THESE SYMPTOMS OCCUR:  1. Fever (greater than 100)  5. Increased abdominal bloating  2. Severe pain    6. Excessive bleeding  3. Nausea and vomiting  7. Chest pain                                                                    4. Chills    8. Shortness of breath    ADDITIONAL INSTRUCTIONS:    Biopsy results: Call [facility] for biopsy results in 1 week    Educational Information:    Recommendations:   Follow pathology results  Continue pantoprazole and Pepcid  Strict diet and lifestyle instructions for GERD as discussed.  Encouraged to lose weight  Patient may need repeat EGD/EUS depending on biopsy results of the gastric nodule  Follow-up with us in the office in 3 to 4

## 2024-10-24 NOTE — PROGRESS NOTES
Ambulatory Surgery/Procedure Discharge Note    Vitals:    10/24/24 0910   BP: 111/84   Pulse: 85   Resp: 16   Temp:    SpO2: 99%       In: 120 [P.O.:120]  Out: -     Restroom use offered before discharge.  Yes    Pain assessment:  none  Pain Level: 0    Patient denies pain. VSS. Patient tolerating all PO intake. Patient offered to void before discharge. Discharge instructions given to patient and patients wife. Patient is ready for discharge.    Patient discharged to home/self care. Patient discharged via wheel chair by transporter to waiting family/S.O.       10/24/2024 9:30 AM

## 2024-10-24 NOTE — PROGRESS NOTES
Pt alert and oriented. IV started. Pt states he is not a diabetic but takes Jardiance. Pt does not know why he takes Jardiance.

## 2024-10-24 NOTE — ANESTHESIA PRE PROCEDURE
Department of Anesthesiology  Preprocedure Note       Name:  Frank Guerrero   Age:  68 y.o.  :  1955                                          MRN:  [id number]         Date:  10/24/2024      Surgeon: Surgeon(s):  Yanira Hewitt MD    Procedure: Procedure(s):  ESOPHAGOGASTRODUODENOSCOPY    Medications prior to admission:   Prior to Admission medications    Medication Sig Start Date End Date Taking? Authorizing Provider   famotidine (PEPCID) 20 MG tablet TAKE 1 TABLET BY MOUTH EVERY NIGHT AS NEEDED FOR HEART BURN 24   ABA Herzog MD   pantoprazole (PROTONIX) 40 MG tablet TAKE 1 TABLET BY MOUTH EVERY MORNING BEFORE BREAKFAST 24   ABA Herzog MD   sacubitril-valsartan (ENTRESTO) 49-51 MG per tablet Take 1 tablet by mouth 2 times daily 24   Carl Hines MD   carvedilol (COREG) 12.5 MG tablet Take 1 tablet by mouth 2 times daily (with meals) 24   Carl Hines MD   spironolactone (ALDACTONE) 25 MG tablet Take 1 tablet by mouth daily 24   Carl Hines MD   rosuvastatin (CRESTOR) 40 MG tablet Take 1 tablet by mouth daily 24   Carl Hines MD   empagliflozin (JARDIANCE) 10 MG tablet Take 1 tablet by mouth every morning 24   Carl Hines MD   clopidogrel (PLAVIX) 75 MG tablet Take 1 tablet by mouth daily 24   Carl Hines MD   albuterol sulfate HFA (PROVENTIL;VENTOLIN;PROAIR) 108 (90 Base) MCG/ACT inhaler INHALE 2 PUFFS INTO THE LUNGS FOUR TIMES DAILY AS NEEDED FOR WHEEZING 6/10/24   ABA Herzog MD   furosemide (LASIX) 40 MG tablet TAKE 1 TABLET BY MOUTH DAILY AS NEEDED FOR WEIGHT GAIN OR SHORTNESS OF BREATH OR SWELLING 24   ABA Herzog MD   vitamin D (ERGOCALCIFEROL) 1.25 MG (64415 UT) CAPS capsule Take 1 capsule by mouth once a week  Patient not taking: Reported on 2024   ABA Herzog MD       Current medications:    No current facility-administered medications for this encounter.       Allergies:

## 2024-10-24 NOTE — PROCEDURES
forceps..  The pylorus was patent and the scope was advanced into the duodenum.  Duodenum: Visualization of the duodenal bulb and the second portion of the duodenum demonstrated normal mucosa, biopsies obtained.  The scope was then withdrawn back into the stomach, it was decompressed, and the scope was completely withdrawn.    Impression:    Diffuse gastritis   6 mm nodule with yellowish hue seen in the gastric antrum, multiple biopsies were obtained.  Another 3 mm similar nodule seen proximally, removed by cold biopsy forceps.        Recommendations:   Follow pathology results  Continue pantoprazole and Pepcid  Strict diet and lifestyle instructions for GERD as discussed.  Encouraged to lose weight  Patient may need repeat EGD/EUS depending on biopsy results of the gastric nodule  Follow-up with us in the office in 3 to 4 weeks.          Alexi Hewitt MD  [facility]  [phone]

## 2024-11-18 DIAGNOSIS — K21.9 GASTROESOPHAGEAL REFLUX DISEASE WITHOUT ESOPHAGITIS: ICD-10-CM

## 2024-11-18 DIAGNOSIS — R10.11 RUQ PAIN: ICD-10-CM

## 2024-11-18 RX ORDER — PANTOPRAZOLE SODIUM 40 MG/1
40 TABLET, DELAYED RELEASE ORAL
Qty: 90 TABLET | Refills: 0 | Status: SHIPPED | OUTPATIENT
Start: 2024-11-18

## 2024-11-18 NOTE — TELEPHONE ENCOUNTER
Medication:   Requested Prescriptions     Pending Prescriptions Disp Refills    pantoprazole (PROTONIX) 40 MG tablet [Pharmacy Med Name: PANTOPRAZOLE 40MG TABLETS] 90 tablet 0     Sig: TAKE 1 TABLET BY MOUTH EVERY MORNING BEFORE BREAKFAST        Last Filled:  8/23/24    Patient Phone Number: [phone] (home)     Last appt: 9/27/2024   Next appt: 1/10/2025    Last OARRS:        No data to display

## 2024-12-13 NOTE — PROGRESS NOTES
[facility]      Cardiology Consult    Frank Guerrero  1955 December 18, 2024    Referring Physician: ABA Herzog MD  Reason for Referral: CAD, ischemic cardiomyopathy     CC: \"more short of breath\"     HPI:  The patient is 69 y.o. male with a past medical history significant for hypertension, hyperlipidemia, ischemic cardiomyopathy, and coronary artery disease who presents today for management of heart failure. He was initially seen for a pre-operative risk assessment prior to a colonoscopy scheduled 6/27/22. He reported a history of a prior MI in 2020 while living in [place] but states a coronary angiogram was not performed. He stated he would be treated with medical therapy. A prior stress test was completed 3/2021 showed a large fixed defect with an LVEF of 28%. He denied a prior echocardiogram. He reported a history of gastric ulcers and follows with GI. He completed an echocardiogram that showed a severely reduced LVEF at 25%. He was started medical therapy but unfortunately his repeat echocardiogram showed the EF had not recovered at 20-25%. He was referred to Dr. Bunch and underwent ICD placement 11/21/22. The  phone is used it today's office visit. He reports shortness of breath that has been progressively worsening over the past month. He states he endorsed worsening shortness of breath prior to his EGD. He describes orthopnea but denies weight changes or LE edema. He states he does feel better when he takes a dose of Lasix. He continues to exercise and denies associated chest pains. He states he takes Lasix a few times per week. He states he continues to exercise and denies any chest pains or pressure. He reports medication compliance and is tolerating. He denies any abnormal bleeding or bruising. He denies exertional chest pain/pressure, dyspnea at rest,palpitations, lightheadedness, weight changes, changes in LE edema, and syncope.    Past Medical

## 2024-12-18 ENCOUNTER — OFFICE VISIT (OUTPATIENT)
Dept: CARDIOLOGY CLINIC | Age: 69
End: 2024-12-18
Payer: COMMERCIAL

## 2024-12-18 VITALS
SYSTOLIC BLOOD PRESSURE: 110 MMHG | HEART RATE: 73 BPM | DIASTOLIC BLOOD PRESSURE: 74 MMHG | HEIGHT: 67 IN | OXYGEN SATURATION: 94 % | BODY MASS INDEX: 37.67 KG/M2 | WEIGHT: 240 LBS

## 2024-12-18 DIAGNOSIS — I25.5 ISCHEMIC CARDIOMYOPATHY: ICD-10-CM

## 2024-12-18 DIAGNOSIS — I50.22 CHRONIC SYSTOLIC CONGESTIVE HEART FAILURE (HCC): ICD-10-CM

## 2024-12-18 DIAGNOSIS — R06.09 DOE (DYSPNEA ON EXERTION): ICD-10-CM

## 2024-12-18 DIAGNOSIS — I10 ESSENTIAL HYPERTENSION: ICD-10-CM

## 2024-12-18 DIAGNOSIS — I25.10 CORONARY ARTERY DISEASE INVOLVING NATIVE CORONARY ARTERY OF NATIVE HEART WITHOUT ANGINA PECTORIS: ICD-10-CM

## 2024-12-18 DIAGNOSIS — Z95.810 CARDIAC DEFIBRILLATOR IN SITU: ICD-10-CM

## 2024-12-18 DIAGNOSIS — E78.5 HYPERLIPIDEMIA LDL GOAL <70: ICD-10-CM

## 2024-12-18 DIAGNOSIS — I50.22 CHRONIC SYSTOLIC CONGESTIVE HEART FAILURE (HCC): Primary | ICD-10-CM

## 2024-12-18 LAB
ANION GAP SERPL CALCULATED.3IONS-SCNC: 10 MMOL/L (ref 3–16)
BUN SERPL-MCNC: 28 MG/DL (ref 7–20)
CALCIUM SERPL-MCNC: 10.3 MG/DL (ref 8.3–10.6)
CHLORIDE SERPL-SCNC: 101 MMOL/L (ref 99–110)
CO2 SERPL-SCNC: 27 MMOL/L (ref 21–32)
CREAT SERPL-MCNC: 1.6 MG/DL (ref 0.8–1.3)
DEPRECATED RDW RBC AUTO: 14.2 % (ref 12.4–15.4)
GFR SERPLBLD CREATININE-BSD FMLA CKD-EPI: 47 ML/MIN/{1.73_M2}
GLUCOSE SERPL-MCNC: 106 MG/DL (ref 70–99)
HCT VFR BLD AUTO: 51.5 % (ref 40.5–52.5)
HGB BLD-MCNC: 17.2 G/DL (ref 13.5–17.5)
MCH RBC QN AUTO: 30.6 PG (ref 26–34)
MCHC RBC AUTO-ENTMCNC: 33.5 G/DL (ref 31–36)
MCV RBC AUTO: 91.4 FL (ref 80–100)
NT-PROBNP SERPL-MCNC: 224 PG/ML (ref 0–124)
PLATELET # BLD AUTO: 243 K/UL (ref 135–450)
PMV BLD AUTO: 8.9 FL (ref 5–10.5)
POTASSIUM SERPL-SCNC: 4.5 MMOL/L (ref 3.5–5.1)
RBC # BLD AUTO: 5.64 M/UL (ref 4.2–5.9)
SODIUM SERPL-SCNC: 138 MMOL/L (ref 136–145)
WBC # BLD AUTO: 6.9 K/UL (ref 4–11)

## 2024-12-18 PROCEDURE — 3017F COLORECTAL CA SCREEN DOC REV: CPT | Performed by: INTERNAL MEDICINE

## 2024-12-18 PROCEDURE — 3078F DIAST BP <80 MM HG: CPT | Performed by: INTERNAL MEDICINE

## 2024-12-18 PROCEDURE — 1036F TOBACCO NON-USER: CPT | Performed by: INTERNAL MEDICINE

## 2024-12-18 PROCEDURE — 3074F SYST BP LT 130 MM HG: CPT | Performed by: INTERNAL MEDICINE

## 2024-12-18 PROCEDURE — 1123F ACP DISCUSS/DSCN MKR DOCD: CPT | Performed by: INTERNAL MEDICINE

## 2024-12-18 PROCEDURE — 99214 OFFICE O/P EST MOD 30 MIN: CPT | Performed by: INTERNAL MEDICINE

## 2024-12-18 PROCEDURE — G8484 FLU IMMUNIZE NO ADMIN: HCPCS | Performed by: INTERNAL MEDICINE

## 2024-12-18 PROCEDURE — 93000 ELECTROCARDIOGRAM COMPLETE: CPT | Performed by: INTERNAL MEDICINE

## 2024-12-18 PROCEDURE — G8427 DOCREV CUR MEDS BY ELIG CLIN: HCPCS | Performed by: INTERNAL MEDICINE

## 2024-12-18 PROCEDURE — G8417 CALC BMI ABV UP PARAM F/U: HCPCS | Performed by: INTERNAL MEDICINE

## 2024-12-18 RX ORDER — INCLISIRAN 284 MG/1.5ML
284 INJECTION, SOLUTION SUBCUTANEOUS SEE ADMIN INSTRUCTIONS
Qty: 1.5 ML | Refills: 0
Start: 2024-12-18

## 2024-12-18 RX ORDER — FUROSEMIDE 40 MG/1
40 TABLET ORAL DAILY
Qty: 90 TABLET | Refills: 3 | Status: SHIPPED | OUTPATIENT
Start: 2024-12-18

## 2024-12-18 RX ORDER — ALBUTEROL SULFATE 90 UG/1
2 INHALANT RESPIRATORY (INHALATION) 4 TIMES DAILY
Qty: 6.7 G | Refills: 1 | Status: SHIPPED | OUTPATIENT
Start: 2024-12-18

## 2024-12-18 RX ORDER — CLOPIDOGREL BISULFATE 75 MG/1
75 TABLET ORAL DAILY
Qty: 90 TABLET | Refills: 3 | Status: SHIPPED | OUTPATIENT
Start: 2024-12-18

## 2024-12-19 DIAGNOSIS — R79.89 ELEVATED SERUM CREATININE: Primary | ICD-10-CM

## 2025-01-03 ENCOUNTER — HOSPITAL ENCOUNTER (OUTPATIENT)
Dept: CARDIOLOGY | Age: 70
Discharge: HOME | End: 2025-01-03
Attending: INTERNAL MEDICINE
Payer: COMMERCIAL

## 2025-01-03 ENCOUNTER — TELEPHONE (OUTPATIENT)
Dept: CARDIOLOGY CLINIC | Age: 70
End: 2025-01-03

## 2025-01-03 VITALS
HEIGHT: 67 IN | DIASTOLIC BLOOD PRESSURE: 74 MMHG | BODY MASS INDEX: 37.67 KG/M2 | SYSTOLIC BLOOD PRESSURE: 110 MMHG | WEIGHT: 240 LBS

## 2025-01-03 DIAGNOSIS — I25.10 CORONARY ARTERY DISEASE INVOLVING NATIVE CORONARY ARTERY OF NATIVE HEART WITHOUT ANGINA PECTORIS: ICD-10-CM

## 2025-01-03 DIAGNOSIS — I25.5 ISCHEMIC CARDIOMYOPATHY: ICD-10-CM

## 2025-01-03 DIAGNOSIS — I50.22 CHRONIC SYSTOLIC CONGESTIVE HEART FAILURE (HCC): Primary | ICD-10-CM

## 2025-01-03 DIAGNOSIS — I50.22 CHRONIC SYSTOLIC HEART FAILURE (HCC): ICD-10-CM

## 2025-01-03 LAB
ECHO AO ASC DIAM: 3.5 CM
ECHO AO ASCENDING AORTA INDEX: 1.61 CM/M2
ECHO AV AREA PEAK VELOCITY: 1.6 CM2
ECHO AV AREA VTI: 1.5 CM2
ECHO AV AREA/BSA PEAK VELOCITY: 0.7 CM2/M2
ECHO AV AREA/BSA VTI: 0.7 CM2/M2
ECHO AV CUSP MM: 2.3 CM
ECHO AV MEAN GRADIENT: 4 MMHG
ECHO AV MEAN VELOCITY: 1 M/S
ECHO AV PEAK GRADIENT: 8 MMHG
ECHO AV PEAK VELOCITY: 1.4 M/S
ECHO AV VELOCITY RATIO: 0.5
ECHO AV VTI: 28.3 CM
ECHO BSA: 2.27 M2
ECHO EST RA PRESSURE: 3 MMHG
ECHO LA AREA 2C: 21.4 CM2
ECHO LA AREA 4C: 16.9 CM2
ECHO LA MAJOR AXIS: 5.5 CM
ECHO LA MINOR AXIS: 6.2 CM
ECHO LA VOL BP: 54 ML (ref 18–58)
ECHO LA VOL MOD A2C: 62 ML (ref 18–58)
ECHO LA VOL MOD A4C: 51 ML (ref 18–58)
ECHO LA VOL/BSA BIPLANE: 25 ML/M2 (ref 16–34)
ECHO LA VOLUME INDEX MOD A2C: 28 ML/M2 (ref 16–34)
ECHO LA VOLUME INDEX MOD A4C: 23 ML/M2 (ref 16–34)
ECHO LV E' LATERAL VELOCITY: 6.57 CM/S
ECHO LV E' SEPTAL VELOCITY: 4.6 CM/S
ECHO LV EDV A2C: 180 ML
ECHO LV EDV A4C: 165 ML
ECHO LV EDV INDEX A4C: 76 ML/M2
ECHO LV EDV NDEX A2C: 83 ML/M2
ECHO LV EJECTION FRACTION A2C: 22 %
ECHO LV EJECTION FRACTION A4C: 26 %
ECHO LV EJECTION FRACTION BIPLANE: 25 % (ref 55–100)
ECHO LV ESV A2C: 141 ML
ECHO LV ESV A4C: 122 ML
ECHO LV ESV INDEX A2C: 65 ML/M2
ECHO LV ESV INDEX A4C: 56 ML/M2
ECHO LV FRACTIONAL SHORTENING: 18 % (ref 28–44)
ECHO LV GLOBAL LONGITUDINAL STRAIN (GLS): -4.4 %
ECHO LV GLOBAL LONGITUDINAL STRAIN (GLS): -5.3 %
ECHO LV GLOBAL LONGITUDINAL STRAIN (GLS): -5.6 %
ECHO LV GLOBAL LONGITUDINAL STRAIN (GLS): -6.1 %
ECHO LV INTERNAL DIMENSION DIASTOLE INDEX: 3.03 CM/M2
ECHO LV INTERNAL DIMENSION DIASTOLIC: 6.6 CM (ref 4.2–5.9)
ECHO LV INTERNAL DIMENSION SYSTOLIC INDEX: 2.48 CM/M2
ECHO LV INTERNAL DIMENSION SYSTOLIC: 5.4 CM
ECHO LV IVSD: 1 CM (ref 0.6–1)
ECHO LV MASS 2D: 290.6 G (ref 88–224)
ECHO LV MASS INDEX 2D: 133.3 G/M2 (ref 49–115)
ECHO LV POSTERIOR WALL DIASTOLIC: 1 CM (ref 0.6–1)
ECHO LV RELATIVE WALL THICKNESS RATIO: 0.3
ECHO LVOT AREA: 3.1 CM2
ECHO LVOT AV VTI INDEX: 0.48
ECHO LVOT DIAM: 2 CM
ECHO LVOT MEAN GRADIENT: 1 MMHG
ECHO LVOT PEAK GRADIENT: 2 MMHG
ECHO LVOT PEAK VELOCITY: 0.7 M/S
ECHO LVOT STROKE VOLUME INDEX: 19.6 ML/M2
ECHO LVOT SV: 42.7 ML
ECHO LVOT VTI: 13.6 CM
ECHO MV A VELOCITY: 0.5 M/S
ECHO MV AREA VTI: 1.7 CM2
ECHO MV E DECELERATION TIME (DT): 189 MS
ECHO MV E VELOCITY: 0.75 M/S
ECHO MV E/A RATIO: 1.5
ECHO MV E/E' LATERAL: 11.42
ECHO MV E/E' RATIO (AVERAGED): 13.86
ECHO MV E/E' SEPTAL: 16.3
ECHO MV LVOT VTI INDEX: 1.86
ECHO MV MAX VELOCITY: 0.9 M/S
ECHO MV MEAN GRADIENT: 2 MMHG
ECHO MV MEAN VELOCITY: 0.6 M/S
ECHO MV PEAK GRADIENT: 3 MMHG
ECHO MV VTI: 25.3 CM
ECHO PV MAX VELOCITY: 0.9 M/S
ECHO PV MEAN GRADIENT: 2 MMHG
ECHO PV MEAN VELOCITY: 0.6 M/S
ECHO PV PEAK GRADIENT: 3 MMHG
ECHO PV VTI: 14.6 CM
ECHO RA AREA 4C: 23.3 CM2
ECHO RA END SYSTOLIC VOLUME APICAL 4 CHAMBER INDEX BSA: 38 ML/M2
ECHO RA VOLUME: 83 ML
ECHO RIGHT VENTRICULAR SYSTOLIC PRESSURE (RVSP): 26 MMHG
ECHO RV TAPSE: 1.9 CM (ref 1.7–?)
ECHO TV REGURGITANT MAX VELOCITY: 2.38 M/S
ECHO TV REGURGITANT PEAK GRADIENT: 23 MMHG

## 2025-01-03 PROCEDURE — 93356 MYOCRD STRAIN IMG SPCKL TRCK: CPT | Performed by: INTERNAL MEDICINE

## 2025-01-03 PROCEDURE — 93306 TTE W/DOPPLER COMPLETE: CPT | Performed by: INTERNAL MEDICINE

## 2025-01-03 PROCEDURE — 93306 TTE W/DOPPLER COMPLETE: CPT

## 2025-01-03 NOTE — TELEPHONE ENCOUNTER
Thuy please call patient to schedule LHC/RHC with next available interventionalist at [facility]. This is a Dr. Hines patient.     Patient speaks Haitian and  line must be used to communicate with patient ([phone]). Thank you.       Pre-Procedure Instructions   You will need to fast for at least 8 hours prior to procedure. No caffeine the morning of.   You will need to hold your anticoagulation, Eliquis for 1 day prior. (Verified with Dr. Hines).   Hold your diuretic, Lasix the morning of procedure.   Hold all diabetic medications including, Metfomin.  If you take Lantus/Levemir only take ½ your normal dose the evening before.  All other medications can be taken in the morning with sips of water.   Do not use any lotions, creams or perfume the morning of procedure.   Pre-procedure lab work will need to be completed 5-7 days prior to procedure.   Please have a responsible adult to drive you home after procedure. We advise you have someone to stay with you for 24 hours following procedure for precautionary measures. Depending on procedure you may require an overnight stay.   Cath lab will provide you with all post procedure instructions.     If you have any questions regarding the procedure itself or medications, please call [phone] and ask to speak with a nurse.

## 2025-01-06 NOTE — TELEPHONE ENCOUNTER
Date of Procedure: Thursday 1/16/25 @ [facility] with Dr. Santillan     Time of arrival: 6:30 am      Procedure time: 8:00 am     Called and spoke to Frank with the  and he is agreeable to date and time. Reviewed and emailed Frank his procedure date, time and instructions and he verbalized understanding. Encouraged to call with any questions or concerns.     Published on [employer] and e-mail to Rosalba.

## 2025-01-07 NOTE — PROGRESS NOTES
Cardiac Electrophysiology Consultation   Date: 1/8/2025   Reason for Consultation: Ischemic Cardiomyopathy   Consult Requesting Physician: Carl Hines MD  Primary Care Physician: ABA Herzog MD     Chief Complaint:   Here for f/u     HPI: Frank Guerrero is a 69 y.o. patient with a history of gastric ulcers hypertension, hyperlipidemia, ischemic cardiomyopathy, coronary artery disease and reported a history of a prior MI in 2020 while living in [place] . Stress test 3/2021 showed a large fixed defect with an LVEF of 28%. Echo on 7/25/2022 showed LVEF 20-25 % and repeat Echo after optimized guideline directed medical therapy 10/21/2022 showed LVEF remained depressed at 20- 25 %.    He was seen 11/09/22 for ICD evaluation. He had class I indication for ICD for primary prevention given LVEF <35%, on GDMT >3 months, FC II-III. Underwent [employer] dual chamber ICD 11/21/2022.    Seen in clinic with Dr. Hines 12/18/2024 and had complaints of worsening SOB. Echo ordered.     Echo completed and noted LVEF of 20-25% with severely dilated left ventricle.     Today, he presents to office for follow up. Reports he is doing well overall with no concerns at this time. Tolerating current medications well with no concerns at this time. He has plans for upcoming [facility] 01/16/2025 with Dr. Hines.      Past Medical History:   Diagnosis Date    CAD (coronary artery disease)     Hyperlipidemia     Hypertension     Ischemic cardiomyopathy     MI (myocardial infarction) (HCC)       Past Surgical History:   Procedure Laterality Date    CARDIAC DEFIBRILLATOR PLACEMENT      UPPER GASTROINTESTINAL ENDOSCOPY N/A 10/24/2024    ESOPHAGOGASTRODUODENOSCOPY BIOPSY small bowel bx r/o celiac gastric bx eval gastritis and r/o HP gastric nodule bx performed by Yanira Hewitt MD at [facility] ENDOSCOPY       Allergies:  Allergies   Allergen Reactions    Pcn [Penicillins] Anaphylaxis    Aspirin Hives     Medication:   Prior to Admission

## 2025-01-08 ENCOUNTER — NURSE ONLY (OUTPATIENT)
Dept: CARDIOLOGY CLINIC | Age: 70
End: 2025-01-08

## 2025-01-08 ENCOUNTER — OFFICE VISIT (OUTPATIENT)
Dept: CARDIOLOGY CLINIC | Age: 70
End: 2025-01-08

## 2025-01-08 VITALS
DIASTOLIC BLOOD PRESSURE: 68 MMHG | WEIGHT: 240 LBS | HEART RATE: 72 BPM | BODY MASS INDEX: 37.67 KG/M2 | OXYGEN SATURATION: 94 % | HEIGHT: 67 IN | SYSTOLIC BLOOD PRESSURE: 108 MMHG

## 2025-01-08 DIAGNOSIS — I25.5 ISCHEMIC CARDIOMYOPATHY: ICD-10-CM

## 2025-01-08 DIAGNOSIS — R00.1 SYMPTOMATIC BRADYCARDIA: ICD-10-CM

## 2025-01-08 DIAGNOSIS — I50.22 CHRONIC SYSTOLIC CONGESTIVE HEART FAILURE (HCC): ICD-10-CM

## 2025-01-08 DIAGNOSIS — I50.22 CHRONIC SYSTOLIC HF (HEART FAILURE) (HCC): ICD-10-CM

## 2025-01-08 DIAGNOSIS — I25.10 CORONARY ARTERY DISEASE INVOLVING NATIVE CORONARY ARTERY OF NATIVE HEART WITHOUT ANGINA PECTORIS: Primary | ICD-10-CM

## 2025-01-08 DIAGNOSIS — I49.5 SINUS NODE DYSFUNCTION (HCC): ICD-10-CM

## 2025-01-08 DIAGNOSIS — I25.10 CORONARY ARTERY DISEASE INVOLVING NATIVE CORONARY ARTERY OF NATIVE HEART WITHOUT ANGINA PECTORIS: ICD-10-CM

## 2025-01-08 DIAGNOSIS — I50.22 CHRONIC SYSTOLIC HEART FAILURE (HCC): ICD-10-CM

## 2025-01-08 DIAGNOSIS — Z95.810 CARDIAC DEFIBRILLATOR IN SITU: Primary | ICD-10-CM

## 2025-01-08 DIAGNOSIS — I44.30 AV HEART BLOCK: ICD-10-CM

## 2025-01-08 LAB
ANION GAP SERPL CALCULATED.3IONS-SCNC: 9 MMOL/L (ref 3–16)
BUN SERPL-MCNC: 19 MG/DL (ref 7–20)
CALCIUM SERPL-MCNC: 10.7 MG/DL (ref 8.3–10.6)
CHLORIDE SERPL-SCNC: 102 MMOL/L (ref 99–110)
CO2 SERPL-SCNC: 28 MMOL/L (ref 21–32)
CREAT SERPL-MCNC: 1.4 MG/DL (ref 0.8–1.3)
DEPRECATED RDW RBC AUTO: 14.7 % (ref 12.4–15.4)
GFR SERPLBLD CREATININE-BSD FMLA CKD-EPI: 55 ML/MIN/{1.73_M2}
GLUCOSE SERPL-MCNC: 103 MG/DL (ref 70–99)
HCT VFR BLD AUTO: 53.2 % (ref 40.5–52.5)
HGB BLD-MCNC: 17.2 G/DL (ref 13.5–17.5)
MCH RBC QN AUTO: 29.8 PG (ref 26–34)
MCHC RBC AUTO-ENTMCNC: 32.3 G/DL (ref 31–36)
MCV RBC AUTO: 92.2 FL (ref 80–100)
PLATELET # BLD AUTO: 214 K/UL (ref 135–450)
PMV BLD AUTO: 8.9 FL (ref 5–10.5)
POTASSIUM SERPL-SCNC: 4.6 MMOL/L (ref 3.5–5.1)
RBC # BLD AUTO: 5.77 M/UL (ref 4.2–5.9)
SODIUM SERPL-SCNC: 139 MMOL/L (ref 136–145)
WBC # BLD AUTO: 7.8 K/UL (ref 4–11)

## 2025-01-10 ENCOUNTER — OFFICE VISIT (OUTPATIENT)
Dept: INTERNAL MEDICINE CLINIC | Age: 70
End: 2025-01-10

## 2025-01-10 VITALS
BODY MASS INDEX: 37.65 KG/M2 | WEIGHT: 240.4 LBS | SYSTOLIC BLOOD PRESSURE: 106 MMHG | DIASTOLIC BLOOD PRESSURE: 70 MMHG | OXYGEN SATURATION: 93 % | HEART RATE: 74 BPM

## 2025-01-10 DIAGNOSIS — R09.82 POST-NASAL DRAINAGE: ICD-10-CM

## 2025-01-10 DIAGNOSIS — I10 ESSENTIAL HYPERTENSION: ICD-10-CM

## 2025-01-10 DIAGNOSIS — I50.9 CHRONIC CONGESTIVE HEART FAILURE, UNSPECIFIED HEART FAILURE TYPE (HCC): ICD-10-CM

## 2025-01-10 DIAGNOSIS — I25.5 ISCHEMIC CARDIOMYOPATHY: Primary | ICD-10-CM

## 2025-01-10 RX ORDER — LORATADINE 10 MG/1
10 TABLET ORAL DAILY
Qty: 30 TABLET | Refills: 0 | Status: SHIPPED | OUTPATIENT
Start: 2025-01-10

## 2025-01-10 RX ORDER — FLUTICASONE PROPIONATE 50 MCG
SPRAY, SUSPENSION (ML) NASAL
Qty: 48 G | OUTPATIENT
Start: 2025-01-10

## 2025-01-10 RX ORDER — FLUTICASONE PROPIONATE 50 MCG
1 SPRAY, SUSPENSION (ML) NASAL DAILY
Qty: 16 G | Refills: 0 | Status: SHIPPED | OUTPATIENT
Start: 2025-01-10

## 2025-01-10 SDOH — ECONOMIC STABILITY: FOOD INSECURITY: WITHIN THE PAST 12 MONTHS, YOU WORRIED THAT YOUR FOOD WOULD RUN OUT BEFORE YOU GOT MONEY TO BUY MORE.: NEVER TRUE

## 2025-01-10 SDOH — ECONOMIC STABILITY: FOOD INSECURITY: WITHIN THE PAST 12 MONTHS, THE FOOD YOU BOUGHT JUST DIDN'T LAST AND YOU DIDN'T HAVE MONEY TO GET MORE.: NEVER TRUE

## 2025-01-10 ASSESSMENT — PATIENT HEALTH QUESTIONNAIRE - PHQ9
SUM OF ALL RESPONSES TO PHQ QUESTIONS 1-9: 0
SUM OF ALL RESPONSES TO PHQ9 QUESTIONS 1 & 2: 0
2. FEELING DOWN, DEPRESSED OR HOPELESS: NOT AT ALL
1. LITTLE INTEREST OR PLEASURE IN DOING THINGS: NOT AT ALL
SUM OF ALL RESPONSES TO PHQ QUESTIONS 1-9: 0

## 2025-01-10 ASSESSMENT — ENCOUNTER SYMPTOMS
CHEST TIGHTNESS: 0
VOICE CHANGE: 0
WHEEZING: 0
TROUBLE SWALLOWING: 0
ABDOMINAL PAIN: 0

## 2025-01-10 NOTE — PROGRESS NOTES
PSA 1.10 02/15/2024 08:56 AM        ASSESSMENT/PLAN:  Assessment/Plan:  Frank was seen today for 3 month follow-up.    Diagnoses and all orders for this visit:    Ischemic cardiomyopathy  History of coronary artery disease  Chronic congestive heart failure, unspecified heart failure type (HCC)  Essential hypertension    History of reduced ejection fraction and defibrillator placement  He is scheduled to have a right and left heart cath in a week since he has dyspnea on mild exertion  Is currently on carvedilol 12.5 twice daily, Jardiance 10 mg/day, Lasix 40 mg daily, Entresto 2 times daily and Aldactone 1 tablet daily  His recent labs have been stable.  Slightly elevated creatinine.  Patient report he has appointment with the nephrologist in upcoming days        Post-nasal drainage  Mostly phlegm in the morning.  -     fluticasone (FLONASE) 50 MCG/ACT nasal spray; 1 spray by Each Nostril route daily  -     loratadine (CLARITIN) 10 MG tablet; Take 1 tablet by mouth daily          No orders of the defined types were placed in this encounter.    Current Outpatient Medications   Medication Sig Dispense Refill    fluticasone (FLONASE) 50 MCG/ACT nasal spray 1 spray by Each Nostril route daily 16 g 0    loratadine (CLARITIN) 10 MG tablet Take 1 tablet by mouth daily 30 tablet 0    apixaban (ELIQUIS) 5 MG TABS tablet Take 1 tablet by mouth 2 times daily 180 tablet 3    furosemide (LASIX) 40 MG tablet Take 1 tablet by mouth daily 90 tablet 3    albuterol sulfate HFA (PROVENTIL;VENTOLIN;PROAIR) 108 (90 Base) MCG/ACT inhaler Inhale 2 puffs into the lungs 4 times daily 6.7 g 1    clopidogrel (PLAVIX) 75 MG tablet Take 1 tablet by mouth daily 90 tablet 3    Inclisiran Sodium (LEQVIO) 284 MG/1.5ML Inject 1.5 mLs into the skin See Admin Instructions Follows with outpatient infusion center. Leqvio dose twice yearly. 1.5 mL 0    pantoprazole (PROTONIX) 40 MG tablet TAKE 1 TABLET BY MOUTH EVERY MORNING BEFORE BREAKFAST 90 tablet

## 2025-01-16 ENCOUNTER — HOSPITAL ENCOUNTER (INPATIENT)
Age: 70
LOS: 1 days | Discharge: HOME | End: 2025-01-17
Attending: INTERNAL MEDICINE | Admitting: INTERNAL MEDICINE
Payer: COMMERCIAL

## 2025-01-16 DIAGNOSIS — I25.10 CAD (CORONARY ARTERY DISEASE): ICD-10-CM

## 2025-01-16 DIAGNOSIS — I50.22 CHRONIC SYSTOLIC HEART FAILURE (HCC): ICD-10-CM

## 2025-01-16 LAB
ANION GAP SERPL CALCULATED.3IONS-SCNC: 9 MMOL/L (ref 3–16)
BUN SERPL-MCNC: 24 MG/DL (ref 7–20)
CALCIUM SERPL-MCNC: 9.8 MG/DL (ref 8.3–10.6)
CHLORIDE SERPL-SCNC: 104 MMOL/L (ref 99–110)
CO2 SERPL-SCNC: 26 MMOL/L (ref 21–32)
CREAT SERPL-MCNC: 1.3 MG/DL (ref 0.8–1.3)
GFR SERPLBLD CREATININE-BSD FMLA CKD-EPI: 60 ML/MIN/{1.73_M2}
GLUCOSE SERPL-MCNC: 97 MG/DL (ref 70–99)
POTASSIUM SERPL-SCNC: 4.4 MMOL/L (ref 3.5–5.1)
SODIUM SERPL-SCNC: 139 MMOL/L (ref 136–145)

## 2025-01-16 PROCEDURE — 99152 MOD SED SAME PHYS/QHP 5/>YRS: CPT | Performed by: INTERNAL MEDICINE

## 2025-01-16 PROCEDURE — C1769 GUIDE WIRE: HCPCS | Performed by: INTERNAL MEDICINE

## 2025-01-16 PROCEDURE — 2580000003 HC RX 258: Performed by: INTERNAL MEDICINE

## 2025-01-16 PROCEDURE — 99153 MOD SED SAME PHYS/QHP EA: CPT | Performed by: INTERNAL MEDICINE

## 2025-01-16 PROCEDURE — 6370000000 HC RX 637 (ALT 250 FOR IP): Performed by: INTERNAL MEDICINE

## 2025-01-16 PROCEDURE — 2100000000 HC CCU R&B

## 2025-01-16 PROCEDURE — 6360000002 HC RX W HCPCS: Performed by: INTERNAL MEDICINE

## 2025-01-16 PROCEDURE — 80048 BASIC METABOLIC PNL TOTAL CA: CPT

## 2025-01-16 PROCEDURE — C1894 INTRO/SHEATH, NON-LASER: HCPCS | Performed by: INTERNAL MEDICINE

## 2025-01-16 PROCEDURE — 93453 R&L HRT CATH W/VENTRICLGRPHY: CPT | Performed by: INTERNAL MEDICINE

## 2025-01-16 PROCEDURE — 2500000003 HC RX 250 WO HCPCS: Performed by: INTERNAL MEDICINE

## 2025-01-16 PROCEDURE — C1751 CATH, INF, PER/CENT/MIDLINE: HCPCS | Performed by: INTERNAL MEDICINE

## 2025-01-16 PROCEDURE — 2709999900 HC NON-CHARGEABLE SUPPLY: Performed by: INTERNAL MEDICINE

## 2025-01-16 PROCEDURE — 4A023N8 MEASUREMENT OF CARDIAC SAMPLING AND PRESSURE, BILATERAL, PERCUTANEOUS APPROACH: ICD-10-PCS | Performed by: INTERNAL MEDICINE

## 2025-01-16 PROCEDURE — B2111ZZ FLUOROSCOPY OF MULTIPLE CORONARY ARTERIES USING LOW OSMOLAR CONTRAST: ICD-10-PCS | Performed by: INTERNAL MEDICINE

## 2025-01-16 RX ORDER — SODIUM CHLORIDE 9 MG/ML
INJECTION, SOLUTION INTRAVENOUS PRN
Status: DISCONTINUED | OUTPATIENT
Start: 2025-01-16 | End: 2025-01-16 | Stop reason: HOSPADM

## 2025-01-16 RX ORDER — ACETAMINOPHEN 325 MG/1
650 TABLET ORAL EVERY 6 HOURS PRN
Status: DISCONTINUED | OUTPATIENT
Start: 2025-01-16 | End: 2025-01-17 | Stop reason: SDUPTHER

## 2025-01-16 RX ORDER — METHYLPREDNISOLONE SODIUM SUCCINATE 125 MG/2ML
125 INJECTION INTRAMUSCULAR; INTRAVENOUS PRN
Status: DISCONTINUED | OUTPATIENT
Start: 2025-01-16 | End: 2025-01-17 | Stop reason: HOSPADM

## 2025-01-16 RX ORDER — POTASSIUM CHLORIDE 1500 MG/1
40 TABLET, EXTENDED RELEASE ORAL PRN
Status: DISCONTINUED | OUTPATIENT
Start: 2025-01-16 | End: 2025-01-17 | Stop reason: HOSPADM

## 2025-01-16 RX ORDER — SODIUM CHLORIDE 0.9 % (FLUSH) 0.9 %
5-40 SYRINGE (ML) INJECTION PRN
Status: DISCONTINUED | OUTPATIENT
Start: 2025-01-16 | End: 2025-01-17 | Stop reason: HOSPADM

## 2025-01-16 RX ORDER — SODIUM CHLORIDE 0.9 % (FLUSH) 0.9 %
5-40 SYRINGE (ML) INJECTION PRN
Status: DISCONTINUED | OUTPATIENT
Start: 2025-01-16 | End: 2025-01-16 | Stop reason: HOSPADM

## 2025-01-16 RX ORDER — SODIUM CHLORIDE 9 MG/ML
INJECTION, SOLUTION INTRAVENOUS PRN
Status: DISCONTINUED | OUTPATIENT
Start: 2025-01-16 | End: 2025-01-17 | Stop reason: HOSPADM

## 2025-01-16 RX ORDER — ACETAMINOPHEN 650 MG/1
650 SUPPOSITORY RECTAL EVERY 6 HOURS PRN
Status: DISCONTINUED | OUTPATIENT
Start: 2025-01-16 | End: 2025-01-17 | Stop reason: HOSPADM

## 2025-01-16 RX ORDER — ASPIRIN 81 MG/1
324 TABLET, CHEWABLE ORAL
Status: COMPLETED | OUTPATIENT
Start: 2025-01-16 | End: 2025-01-16

## 2025-01-16 RX ORDER — EPINEPHRINE 1 MG/ML
0.5 INJECTION, SOLUTION, CONCENTRATE INTRAVENOUS PRN
Status: DISCONTINUED | OUTPATIENT
Start: 2025-01-16 | End: 2025-01-16

## 2025-01-16 RX ORDER — ACETAMINOPHEN 325 MG/1
650 TABLET ORAL EVERY 4 HOURS PRN
Status: DISCONTINUED | OUTPATIENT
Start: 2025-01-16 | End: 2025-01-17 | Stop reason: SDUPTHER

## 2025-01-16 RX ORDER — ASPIRIN 81 MG/1
40.5 TABLET, CHEWABLE ORAL
Status: COMPLETED | OUTPATIENT
Start: 2025-01-16 | End: 2025-01-16

## 2025-01-16 RX ORDER — CLOPIDOGREL BISULFATE 75 MG/1
75 TABLET ORAL ONCE
Status: COMPLETED | OUTPATIENT
Start: 2025-01-16 | End: 2025-01-16

## 2025-01-16 RX ORDER — MIDAZOLAM HYDROCHLORIDE 1 MG/ML
INJECTION, SOLUTION INTRAMUSCULAR; INTRAVENOUS PRN
Status: DISCONTINUED | OUTPATIENT
Start: 2025-01-16 | End: 2025-01-16 | Stop reason: HOSPADM

## 2025-01-16 RX ORDER — DIPHENHYDRAMINE HYDROCHLORIDE 50 MG/ML
50 INJECTION INTRAMUSCULAR; INTRAVENOUS PRN
Status: DISCONTINUED | OUTPATIENT
Start: 2025-01-16 | End: 2025-01-17 | Stop reason: HOSPADM

## 2025-01-16 RX ORDER — FENTANYL CITRATE 50 UG/ML
INJECTION, SOLUTION INTRAMUSCULAR; INTRAVENOUS PRN
Status: DISCONTINUED | OUTPATIENT
Start: 2025-01-16 | End: 2025-01-16 | Stop reason: HOSPADM

## 2025-01-16 RX ORDER — METHYLPREDNISOLONE SODIUM SUCCINATE 125 MG/2ML
125 INJECTION INTRAMUSCULAR; INTRAVENOUS PRN
Status: DISCONTINUED | OUTPATIENT
Start: 2025-01-16 | End: 2025-01-16

## 2025-01-16 RX ORDER — ENOXAPARIN SODIUM 100 MG/ML
40 INJECTION SUBCUTANEOUS DAILY
Status: DISCONTINUED | OUTPATIENT
Start: 2025-01-16 | End: 2025-01-17 | Stop reason: HOSPADM

## 2025-01-16 RX ORDER — ONDANSETRON 2 MG/ML
4 INJECTION INTRAMUSCULAR; INTRAVENOUS EVERY 6 HOURS PRN
Status: DISCONTINUED | OUTPATIENT
Start: 2025-01-16 | End: 2025-01-17 | Stop reason: HOSPADM

## 2025-01-16 RX ORDER — ASPIRIN 81 MG/1
162 TABLET, CHEWABLE ORAL
Status: COMPLETED | OUTPATIENT
Start: 2025-01-16 | End: 2025-01-16

## 2025-01-16 RX ORDER — MAGNESIUM SULFATE IN WATER 40 MG/ML
2000 INJECTION, SOLUTION INTRAVENOUS PRN
Status: DISCONTINUED | OUTPATIENT
Start: 2025-01-16 | End: 2025-01-17 | Stop reason: HOSPADM

## 2025-01-16 RX ORDER — SODIUM CHLORIDE 0.9 % (FLUSH) 0.9 %
5-40 SYRINGE (ML) INJECTION EVERY 12 HOURS SCHEDULED
Status: DISCONTINUED | OUTPATIENT
Start: 2025-01-16 | End: 2025-01-17 | Stop reason: HOSPADM

## 2025-01-16 RX ORDER — SODIUM CHLORIDE 0.9 % (FLUSH) 0.9 %
5-40 SYRINGE (ML) INJECTION EVERY 12 HOURS SCHEDULED
Status: DISCONTINUED | OUTPATIENT
Start: 2025-01-16 | End: 2025-01-16 | Stop reason: HOSPADM

## 2025-01-16 RX ORDER — POTASSIUM CHLORIDE 7.45 MG/ML
10 INJECTION INTRAVENOUS PRN
Status: DISCONTINUED | OUTPATIENT
Start: 2025-01-16 | End: 2025-01-17 | Stop reason: HOSPADM

## 2025-01-16 RX ORDER — EPINEPHRINE 1 MG/ML
0.5 INJECTION, SOLUTION, CONCENTRATE INTRAVENOUS PRN
Status: DISCONTINUED | OUTPATIENT
Start: 2025-01-16 | End: 2025-01-17 | Stop reason: HOSPADM

## 2025-01-16 RX ORDER — DIPHENHYDRAMINE HYDROCHLORIDE 50 MG/ML
50 INJECTION INTRAMUSCULAR; INTRAVENOUS PRN
Status: DISCONTINUED | OUTPATIENT
Start: 2025-01-16 | End: 2025-01-16

## 2025-01-16 RX ORDER — WATER 10 ML/10ML
INJECTION INTRAMUSCULAR; INTRAVENOUS; SUBCUTANEOUS
Status: DISCONTINUED
Start: 2025-01-16 | End: 2025-01-16 | Stop reason: WASHOUT

## 2025-01-16 RX ORDER — ONDANSETRON 4 MG/1
4 TABLET, ORALLY DISINTEGRATING ORAL EVERY 8 HOURS PRN
Status: DISCONTINUED | OUTPATIENT
Start: 2025-01-16 | End: 2025-01-17 | Stop reason: HOSPADM

## 2025-01-16 RX ORDER — POLYETHYLENE GLYCOL 3350 17 G/17G
17 POWDER, FOR SOLUTION ORAL DAILY PRN
Status: DISCONTINUED | OUTPATIENT
Start: 2025-01-16 | End: 2025-01-17 | Stop reason: HOSPADM

## 2025-01-16 RX ORDER — ASPIRIN 81 MG/1
81 TABLET, CHEWABLE ORAL
Status: COMPLETED | OUTPATIENT
Start: 2025-01-16 | End: 2025-01-16

## 2025-01-16 RX ADMIN — SODIUM CHLORIDE: 9 INJECTION, SOLUTION INTRAVENOUS at 07:21

## 2025-01-16 RX ADMIN — ASPIRIN 81 MG 30 MG: 81 TABLET ORAL at 11:51

## 2025-01-16 RX ADMIN — SODIUM CHLORIDE, PRESERVATIVE FREE 10 ML: 5 INJECTION INTRAVENOUS at 08:45

## 2025-01-16 RX ADMIN — ASPIRIN 162 MG: 81 TABLET, CHEWABLE ORAL at 12:36

## 2025-01-16 RX ADMIN — ENOXAPARIN SODIUM 40 MG: 100 INJECTION SUBCUTANEOUS at 12:00

## 2025-01-16 RX ADMIN — ASPIRIN 81 MG: 81 TABLET, CHEWABLE ORAL at 12:21

## 2025-01-16 RX ADMIN — CLOPIDOGREL BISULFATE 75 MG: 75 TABLET ORAL at 07:34

## 2025-01-16 RX ADMIN — Medication 10 ML: at 07:21

## 2025-01-16 RX ADMIN — ASPIRIN 81 MG 10 MG: 81 TABLET ORAL at 11:36

## 2025-01-16 RX ADMIN — ASPIRIN 324 MG: 81 TABLET, CHEWABLE ORAL at 12:51

## 2025-01-16 RX ADMIN — ASPIRIN 81 MG 0.1 MG: 81 TABLET ORAL at 11:06

## 2025-01-16 RX ADMIN — ASPIRIN 40.5 MG: 81 TABLET, CHEWABLE ORAL at 12:06

## 2025-01-16 RX ADMIN — SODIUM CHLORIDE, PRESERVATIVE FREE 10 ML: 5 INJECTION INTRAVENOUS at 20:28

## 2025-01-16 RX ADMIN — ASPIRIN 81 MG 0.3 MG: 81 TABLET ORAL at 11:21

## 2025-01-16 NOTE — CONSULTS
Clinical Pharmacy Note  Aspirin Desensitization      ASA desensitization protocol reviewed with Dr Santillan.  Physician in agreement with plan.  Risks discussed with patient by Dr Santillan.  Patient verbalized understanding and provided consent to desensitization.     Protocol reviewed with RN. RN to assess patient for allergic reaction prior to each dose. Patient instructed to immediately notify RN if evidence of any adverse reaction; patient verbalized understanding via . Desensitization initiated at 1106. Dr Santillan and Pulmonary Critical Care made aware of initiation and will be immediately available throughout the desensitization.     ASA Challenge/Desensitization Protocol    Time  (min) ASA Dose  (mg)   0 0.1   15 0.3   30 10   45 30   60 40.5   85 81   110 162   135 324     Medications to bedside:  Epinephrine 1 mg IV (1:1000) solution  Methylprednisolone 125 mg IV vial  Diphenhydramine 50 mg IV vial    Summer Schwab, RPH, PharmD, BCCCP 1/16/2025 11:07 AM

## 2025-01-16 NOTE — PROGRESS NOTES
TR band removed. Hemostasis achieved. R radial site soft and no hematoma or oozing noted.  Occlusive dressing dry and intact.  Pedal pulses easily palpated.      Electronically signed by Billie Pace RN on 1/16/2025 at 4:30 PM

## 2025-01-16 NOTE — CARE COORDINATION
Chart Reviewed.  Pt is from home, recent PCP appt and has insurance.  Following for DC needs.  Please consult Case Management for any DC needs.   RAMY De Dios     Case Management   [phone]    1/16/2025  1:33 PM

## 2025-01-16 NOTE — PLAN OF CARE
Problem: Chronic Conditions and Co-morbidities  Goal: Patient's chronic conditions and co-morbidity symptoms are monitored and maintained or improved  Outcome: Progressing     Problem: Respiratory - Adult  Goal: Achieves optimal ventilation and oxygenation  Outcome: Progressing     Problem: Cardiovascular - Adult  Goal: Maintains optimal cardiac output and hemodynamic stability  Outcome: Progressing  Goal: Absence of cardiac dysrhythmias or at baseline  Outcome: Progressing     Problem: Musculoskeletal - Adult  Goal: Return mobility to safest level of function  Outcome: Progressing  Goal: Maintain proper alignment of affected body part  Outcome: Progressing  Goal: Return ADL status to a safe level of function  Outcome: Progressing     Problem: Metabolic/Fluid and Electrolytes - Adult  Goal: Electrolytes maintained within normal limits  Outcome: Progressing  Goal: Hemodynamic stability and optimal renal function maintained  Outcome: Progressing  Goal: Glucose maintained within prescribed range  Outcome: Progressing     Problem: Hematologic - Adult  Goal: Maintains hematologic stability  Outcome: Progressing     Problem: ABCDS Injury Assessment  Goal: Absence of physical injury  Outcome: Progressing

## 2025-01-16 NOTE — H&P
H&P Update -   [facility]                                                  Cardiology Consult     Frank Guerrero  1955 December 18, 2024     Referring Physician: ABA Herzog MD  Reason for Referral: CAD, ischemic cardiomyopathy      CC: \"more short of breath\"      HPI:  The patient is 69 y.o. male with a past medical history significant for hypertension, hyperlipidemia, ischemic cardiomyopathy, and coronary artery disease who presents today for management of heart failure. He was initially seen for a pre-operative risk assessment prior to a colonoscopy scheduled 6/27/22. He reported a history of a prior MI in 2020 while living in [place] but states a coronary angiogram was not performed. He stated he would be treated with medical therapy. A prior stress test was completed 3/2021 showed a large fixed defect with an LVEF of 28%. He denied a prior echocardiogram. He reported a history of gastric ulcers and follows with GI. He completed an echocardiogram that showed a severely reduced LVEF at 25%. He was started medical therapy but unfortunately his repeat echocardiogram showed the EF had not recovered at 20-25%. He was referred to Dr. Bunch and underwent ICD placement 11/21/22. The  phone is used it today's office visit. He reports shortness of breath that has been progressively worsening over the past month. He states he endorsed worsening shortness of breath prior to his EGD. He describes orthopnea but denies weight changes or LE edema. He states he does feel better when he takes a dose of Lasix. He continues to exercise and denies associated chest pains. He states he takes Lasix a few times per week. He states he continues to exercise and denies any chest pains or pressure. He reports medication compliance and is tolerating. He denies any abnormal bleeding or bruising. He denies exertional chest pain/pressure, dyspnea at rest,palpitations, lightheadedness, weight

## 2025-01-16 NOTE — DISCHARGE INSTRUCTIONS
Extra Heart Failure Education/ Tools/ Resources:     https://Backupify.com/publication/?z=547751   --- this is American Heart Association interactive Healthier Living with Heart Failure guidebook.  Please click hyperlink or copy / paste link into search bar. The QR Code is also available below. Use your mouse to scroll through the pages.  Lots of information about weight monitoring, diet tips, activity, meds, etc    Heart Failure Tools and Resources QR Code is below. It includes multiple resources to include symptom tracker, med tracker, further HF info, and access to a HF Support Network online Community    HF [place] Malcolm  -- this is a free smart phone malcolm available for iPhone and Android download.  Use your phone to track sodium / fluid intake, zone tool symptom tracking, weights, medications, etc. Click on this hyperlink  HF [place] Malcolm   for QR code for easy download or the link is also found in the below HF Tools and Resources.      DASH (Dietary Approach to Stop Hypertension) diet --  https://www.nhlbi.nih.gov/education/dash-eating-plan -- this diet is a flexible eating plan that promotes heart healthy eating style.  Click on hyperlink or copy / paste link into search bar.  Lots of low sodium recipes and tips.    https://www.DocRun.Dashbid/recipes  -- more free recipes

## 2025-01-16 NOTE — PROGRESS NOTES
Pt received from cath lab. Pt placed on monitor, pt presents with sinus rhythm. Pt on RA. Pt bob score is 10 at the time of handoff. Handoff preformed with Justine VELAZQUEZ.

## 2025-01-16 NOTE — CONSULTS
02/12/2020  Maria A Smith is a 74 y.o., female.    Anesthesia Evaluation    I have reviewed the Patient Summary Reports.    I have reviewed the Nursing Notes.   I have reviewed the Medications.     Review of Systems  Anesthesia Hx:  No problems with previous Anesthesia    Social:  Former Smoker    Hematology/Oncology:         -- Anemia: --  Cancer in past history (squamous cell CA):    Cardiovascular:   Hypertension, well controlled hyperlipidemia    Pulmonary:  Pulmonary Normal    Renal/:  Renal/ Normal     Neurological:   CVA (L hemiparesis (slight)), residual symptoms    Endocrine:  Endocrine Normal        Physical Exam  General:  Well nourished    Airway/Jaw/Neck:  Airway Findings: Mouth Opening: Normal Tongue: Normal  General Airway Assessment: Adult  Oropharynx Findings:  Mallampati: II  Jaw/Neck Findings:  Neck ROM: Normal ROM     Eyes/Ears/Nose:  Eyes/Ears/Nose Findings:    Dental:  Dental Findings:   Chest/Lungs:  Chest/Lungs Findings: Normal Respiratory Rate     Heart/Vascular:  Heart Findings: Rate: Normal  Rhythm: Regular Rhythm        Mental Status:  Mental Status Findings:  Cooperative, Alert and Oriented         Anesthesia Plan  Type of Anesthesia, risks & benefits discussed:  Anesthesia Type:  general  Patient's Preference:   Intra-op Monitoring Plan: standard ASA monitors  Intra-op Monitoring Plan Comments:   Post Op Pain Control Plan: multimodal analgesia  Post Op Pain Control Plan Comments:   Induction:   IV  Beta Blocker:  Patient is not currently on a Beta-Blocker (No further documentation required).       Informed Consent: Patient understands risks and agrees with Anesthesia plan.  Questions answered. Anesthesia consent signed with patient.  ASA Score: 3     Day of Surgery Review of History & Physical:  There are no significant changes.   H&P completed by Anesthesiologist.  Session ID: [id number]  Language: Occitan   ID: #[id number]   Name: Tripp       Ready For Surgery From Anesthesia Perspective.

## 2025-01-16 NOTE — PROGRESS NOTES
Clinical Pharmacy Note  Aspirin Desensitization      ASA desensitization protocol completed at 1251. No evidence of reaction. Dr Santillan and pulmonary critical care updated. RN to continue to monitor patient for 1 hour after desensitization per Dr. Santillan.     Patient has been counseled that they must take aspirin daily. If aspirin is interrupted for 7 days, aspirin desensitization may be required again.     Orders for rescue medications updated per Dr. Santillan.  Epinephrine 1 mg IV (1:1000) solution  Methylprednisolone 125 mg IV vial  Diphenhydramine 50 mg IV vial    Allergies updated to reflect aspirin desensitization was completed today.       Summer Schwab, RPH, PharmD, BCCCP 1/16/2025 1:25 PM

## 2025-01-17 VITALS
WEIGHT: 235.67 LBS | SYSTOLIC BLOOD PRESSURE: 117 MMHG | HEIGHT: 67 IN | RESPIRATION RATE: 36 BRPM | TEMPERATURE: 98.1 F | HEART RATE: 78 BPM | DIASTOLIC BLOOD PRESSURE: 75 MMHG | OXYGEN SATURATION: 95 % | BODY MASS INDEX: 36.99 KG/M2

## 2025-01-17 LAB
ANION GAP SERPL CALCULATED.3IONS-SCNC: 8 MMOL/L (ref 3–16)
BUN SERPL-MCNC: 21 MG/DL (ref 7–20)
CALCIUM SERPL-MCNC: 9.5 MG/DL (ref 8.3–10.6)
CHLORIDE SERPL-SCNC: 101 MMOL/L (ref 99–110)
CO2 SERPL-SCNC: 26 MMOL/L (ref 21–32)
CREAT SERPL-MCNC: 1.2 MG/DL (ref 0.8–1.3)
ECHO BSA: 2.24 M2
EKG ATRIAL RATE: 72 BPM
EKG DIAGNOSIS: NORMAL
EKG P AXIS: 35 DEGREES
EKG P-R INTERVAL: 150 MS
EKG Q-T INTERVAL: 406 MS
EKG QRS DURATION: 102 MS
EKG QTC CALCULATION (BAZETT): 444 MS
EKG R AXIS: -72 DEGREES
EKG T AXIS: 45 DEGREES
EKG VENTRICULAR RATE: 72 BPM
GFR SERPLBLD CREATININE-BSD FMLA CKD-EPI: 66 ML/MIN/{1.73_M2}
GLUCOSE SERPL-MCNC: 95 MG/DL (ref 70–99)
POTASSIUM SERPL-SCNC: 4.3 MMOL/L (ref 3.5–5.1)
SODIUM SERPL-SCNC: 135 MMOL/L (ref 136–145)

## 2025-01-17 PROCEDURE — 93010 ELECTROCARDIOGRAM REPORT: CPT | Performed by: INTERNAL MEDICINE

## 2025-01-17 PROCEDURE — 6360000002 HC RX W HCPCS: Performed by: INTERNAL MEDICINE

## 2025-01-17 PROCEDURE — 92978 ENDOLUMINL IVUS OCT C 1ST: CPT | Performed by: INTERNAL MEDICINE

## 2025-01-17 PROCEDURE — 2500000003 HC RX 250 WO HCPCS: Performed by: INTERNAL MEDICINE

## 2025-01-17 PROCEDURE — C1753 CATH, INTRAVAS ULTRASOUND: HCPCS | Performed by: INTERNAL MEDICINE

## 2025-01-17 PROCEDURE — 80048 BASIC METABOLIC PNL TOTAL CA: CPT

## 2025-01-17 PROCEDURE — 99152 MOD SED SAME PHYS/QHP 5/>YRS: CPT | Performed by: INTERNAL MEDICINE

## 2025-01-17 PROCEDURE — C1769 GUIDE WIRE: HCPCS | Performed by: INTERNAL MEDICINE

## 2025-01-17 PROCEDURE — C1725 CATH, TRANSLUMIN NON-LASER: HCPCS | Performed by: INTERNAL MEDICINE

## 2025-01-17 PROCEDURE — 36415 COLL VENOUS BLD VENIPUNCTURE: CPT

## 2025-01-17 PROCEDURE — B2101ZZ FLUOROSCOPY OF SINGLE CORONARY ARTERY USING LOW OSMOLAR CONTRAST: ICD-10-PCS | Performed by: INTERNAL MEDICINE

## 2025-01-17 PROCEDURE — 2700000000 HC OXYGEN THERAPY PER DAY

## 2025-01-17 PROCEDURE — C1894 INTRO/SHEATH, NON-LASER: HCPCS | Performed by: INTERNAL MEDICINE

## 2025-01-17 PROCEDURE — C1887 CATHETER, GUIDING: HCPCS | Performed by: INTERNAL MEDICINE

## 2025-01-17 PROCEDURE — 027034Z DILATION OF CORONARY ARTERY, ONE ARTERY WITH DRUG-ELUTING INTRALUMINAL DEVICE, PERCUTANEOUS APPROACH: ICD-10-PCS | Performed by: INTERNAL MEDICINE

## 2025-01-17 PROCEDURE — 2709999900 HC NON-CHARGEABLE SUPPLY: Performed by: INTERNAL MEDICINE

## 2025-01-17 PROCEDURE — 92928 PRQ TCAT PLMT NTRAC ST 1 LES: CPT | Performed by: INTERNAL MEDICINE

## 2025-01-17 PROCEDURE — B240ZZ3 ULTRASONOGRAPHY OF SINGLE CORONARY ARTERY, INTRAVASCULAR: ICD-10-PCS | Performed by: INTERNAL MEDICINE

## 2025-01-17 PROCEDURE — 94761 N-INVAS EAR/PLS OXIMETRY MLT: CPT

## 2025-01-17 PROCEDURE — 99153 MOD SED SAME PHYS/QHP EA: CPT | Performed by: INTERNAL MEDICINE

## 2025-01-17 PROCEDURE — 6360000004 HC RX CONTRAST MEDICATION: Performed by: INTERNAL MEDICINE

## 2025-01-17 PROCEDURE — 93005 ELECTROCARDIOGRAM TRACING: CPT | Performed by: INTERNAL MEDICINE

## 2025-01-17 PROCEDURE — C1874 STENT, COATED/COV W/DEL SYS: HCPCS | Performed by: INTERNAL MEDICINE

## 2025-01-17 PROCEDURE — 6370000000 HC RX 637 (ALT 250 FOR IP): Performed by: INTERNAL MEDICINE

## 2025-01-17 DEVICE — STENT ONYXNG40022UX ONYX 4.00X22RX
Type: IMPLANTABLE DEVICE | Status: FUNCTIONAL
Brand: ONYX FRONTIER™

## 2025-01-17 RX ORDER — LIDOCAINE HYDROCHLORIDE 10 MG/ML
INJECTION, SOLUTION INFILTRATION; PERINEURAL PRN
Status: DISCONTINUED | OUTPATIENT
Start: 2025-01-17 | End: 2025-01-17 | Stop reason: HOSPADM

## 2025-01-17 RX ORDER — SODIUM CHLORIDE 0.9 % (FLUSH) 0.9 %
5-40 SYRINGE (ML) INJECTION PRN
Status: DISCONTINUED | OUTPATIENT
Start: 2025-01-17 | End: 2025-01-17 | Stop reason: HOSPADM

## 2025-01-17 RX ORDER — MIDAZOLAM HYDROCHLORIDE 1 MG/ML
INJECTION, SOLUTION INTRAMUSCULAR; INTRAVENOUS PRN
Status: DISCONTINUED | OUTPATIENT
Start: 2025-01-17 | End: 2025-01-17 | Stop reason: HOSPADM

## 2025-01-17 RX ORDER — NITROGLYCERIN 20 MG/100ML
INJECTION INTRAVENOUS CONTINUOUS PRN
Status: DISCONTINUED | OUTPATIENT
Start: 2025-01-17 | End: 2025-01-17 | Stop reason: HOSPADM

## 2025-01-17 RX ORDER — SODIUM CHLORIDE 9 MG/ML
INJECTION, SOLUTION INTRAVENOUS PRN
Status: DISCONTINUED | OUTPATIENT
Start: 2025-01-17 | End: 2025-01-17 | Stop reason: HOSPADM

## 2025-01-17 RX ORDER — SODIUM CHLORIDE 0.9 % (FLUSH) 0.9 %
5-40 SYRINGE (ML) INJECTION EVERY 12 HOURS SCHEDULED
Status: DISCONTINUED | OUTPATIENT
Start: 2025-01-17 | End: 2025-01-17 | Stop reason: HOSPADM

## 2025-01-17 RX ORDER — ASPIRIN 81 MG/1
81 TABLET, CHEWABLE ORAL DAILY
Status: DISCONTINUED | OUTPATIENT
Start: 2025-01-18 | End: 2025-01-17 | Stop reason: HOSPADM

## 2025-01-17 RX ORDER — ASPIRIN 81 MG/1
81 TABLET, CHEWABLE ORAL DAILY
Qty: 30 TABLET | Refills: 3 | Status: SHIPPED | OUTPATIENT
Start: 2025-01-18

## 2025-01-17 RX ORDER — ASPIRIN 81 MG/1
TABLET, CHEWABLE ORAL PRN
Status: DISCONTINUED | OUTPATIENT
Start: 2025-01-17 | End: 2025-01-17 | Stop reason: HOSPADM

## 2025-01-17 RX ORDER — EPTIFIBATIDE 2 MG/ML
INJECTION, SOLUTION INTRAVENOUS PRN
Status: DISCONTINUED | OUTPATIENT
Start: 2025-01-17 | End: 2025-01-17 | Stop reason: HOSPADM

## 2025-01-17 RX ORDER — ACETAMINOPHEN 325 MG/1
650 TABLET ORAL EVERY 4 HOURS PRN
Status: DISCONTINUED | OUTPATIENT
Start: 2025-01-17 | End: 2025-01-17 | Stop reason: HOSPADM

## 2025-01-17 RX ORDER — IOPAMIDOL 755 MG/ML
INJECTION, SOLUTION INTRAVASCULAR PRN
Status: DISCONTINUED | OUTPATIENT
Start: 2025-01-17 | End: 2025-01-17 | Stop reason: HOSPADM

## 2025-01-17 RX ORDER — HEPARIN SODIUM 1000 [USP'U]/ML
INJECTION, SOLUTION INTRAVENOUS; SUBCUTANEOUS PRN
Status: DISCONTINUED | OUTPATIENT
Start: 2025-01-17 | End: 2025-01-17 | Stop reason: HOSPADM

## 2025-01-17 RX ADMIN — ENOXAPARIN SODIUM 40 MG: 100 INJECTION SUBCUTANEOUS at 10:18

## 2025-01-17 NOTE — PROGRESS NOTES
Pt admitted to CVICU from CCL. Report received from Conrado VELAZQUEZ. TR band in R radial with 12 of air. Educated patient on use of call light. Vitals are stable. Will review MD orders.    Electronically signed by Billie Pace RN on 1/17/2025 at 8:25 AM

## 2025-01-17 NOTE — DISCHARGE SUMMARY
[facility]    DISCHARGE SUMMARY      Patient ID:  Frank Guerrero  [id number] 69 y.o. 1955    Admit date: 1/16/2025    Discharge date:      Admitting Physician: Miki Santillan MD     Discharge Physician: Miki Santillan MD     Admission Diagnoses: Chronic systolic heart failure (HCC) [I50.22]  CAD (coronary artery disease) [I25.10]  CAD in native artery [I25.10]    Discharge Diagnoses:   Patient Active Problem List   Diagnosis    Ischemic cardiomyopathy    Nocturnal dyspnea    Gastroesophageal reflux disease with esophagitis without hemorrhage    Hiatal hernia    Coronary artery disease involving native coronary artery of native heart without angina pectoris    Essential hypertension    Language barrier to communication    Pure hypercholesterolemia    Abnormal findings on esophagogastroduodenoscopy (EGD)    Cardiac defibrillator in situ    Symptomatic bradycardia    Sinus node dysfunction (HCC)    AV heart block    Chronic systolic HF (heart failure) (HCC)    Chronic congestive heart failure (HCC)    Hyperlipemia    Chronic systolic heart failure (HCC)    CAD (coronary artery disease)    CAD in native artery        Discharged Condition: good    Hospital Course: Frnak Guerrero was admitted for s/p PCI mid LAD X 1 YANI     Consults: none    Significant Diagnostic Studies:   Labs:   Lab Results   Component Value Date    CREATININE 1.2 01/17/2025    BUN 21 (H) 01/17/2025     (L) 01/17/2025    K 4.3 01/17/2025     01/17/2025    CO2 26 01/17/2025      Lab Results   Component Value Date    WBC 7.8 01/08/2025    HGB 17.2 01/08/2025    HCT 53.2 (H) 01/08/2025    MCV 92.2 01/08/2025     01/08/2025    No results found for: \"INR\", \"PROTIME\" No results found for: \"BNP\"      Disposition: home    Patient Instructions:      Medication List        START taking these medications      aspirin 81 MG chewable tablet  Take 1 tablet by mouth daily  Start taking on: January 18, 2025     ticagrelor

## 2025-01-17 NOTE — PROGRESS NOTES
NAME:  Frank Guerrero  YOB: 1955  MEDICAL RECORD NUMBER:  [id number]    Shift Summary: Pt admitted to CVICU for aspirin desensitization (previous OP). Started protocol at 11:06am. Q15 VS.  Aspirin protocol complete at 12:51pm. Pt did not experience any previous symptoms. R brachial sheath removed in cath lab.TR band removed. PCI tomorrow at 7:30am.     Family updated: Yes:  family at bedside with     Rhythm: Paced     Most recent vitals:   Visit Vitals  /66   Pulse 77   Temp 97.9 °F (36.6 °C) (Axillary)   Resp 19   Ht 1.702 m (5' 7\")   Wt 106.3 kg (234 lb 5.6 oz)   SpO2 94%   BMI 36.70 kg/m²      ABP (Arterial line BP): 112/75  ABP Mean (Arterial Line Mean): 90 mmHg    No data found.    No data found.      Respiratory support needed (if any):  - RA    Admission weight Weight - Scale: 106.3 kg (234 lb 5.6 oz) (01/16/25 1151)    Today's weight    Wt Readings from Last 1 Encounters:   01/16/25 106.3 kg (234 lb 5.6 oz)        Grady need assessed each shift: N/A - no grady present  UOP >30ml/hr: NO -    Last documented BM (in last 48 hrs):  Patient Vitals for the past 48 hrs:   Last BM (including prior to admit) Stool Occurrence   01/16/25 0845 01/15/25 --   01/16/25 1730 01/15/25 1                Restraints (in use currently or dc'd in last 12 hrs): No    Order current and documentation up to date? Yes    Lines/Drains reviewed @ bedside.  Peripheral IV 01/16/25 Left Antecubital (Active)   Number of days: 0         Drip rates at handoff:    sodium chloride      sodium chloride         Lab Data:   CBC: No results for input(s): \"WBC\", \"HGB\", \"HCT\", \"MCV\", \"PLT\" in the last 72 hours.  BMP:    Recent Labs     01/16/25  0706      K 4.4   CO2 26   BUN 24*   CREATININE 1.3     LIVR: No results for input(s): \"AST\", \"ALT\" in the last 72 hours.  PT/INR: No results for input(s): \"INR\" in the last 72 hours.    Invalid input(s): \"PROT\"  APTT: No results for input(s): \"APTT\" in the  last 72 hours.  ABG: No results for input(s): \"PHART\", \"[id number]\", \"PO2ART\" in the last 72 hours.    Any consults during the shift? No    Any signed and held orders to be released?  No        4 Eyes Skin Assessment       The patient is being assessed for  Shift Handoff    I agree that at least one RN has performed a thorough Head to Toe Skin Assessment on the patient. ALL assessment sites listed below have been assessed.      Areas assessed by both nurses: Head, Face, Ears, Shoulders, Back, Chest, Arms, Elbows, Hands, Sacrum. Buttock, Coccyx, Ischium, Legs. Feet and Heels, and Under Medical Devices         Does the Patient have a Wound? No noted wound(s)    Wound Care Orders initiated by RN: No       Edwin Prevention initiated by RN: No    Pressure Injury (Stage 3,4, Unstageable, DTI, NWPT, and Complex wounds) if present, place Wound referral order by RN under : No    New Ostomies, if present place, Ostomy referral order under : No     Nurse 1 eSignature: Electronically signed by Billie Paec RN on 1/16/25 at 7:34 PM EST    **SHARE this note so that the co-signing nurse can place an eSignature**    Nurse 2 eSignature: Electronically signed by Sobeida Mccollum RN on 1/16/25 at 8:34 PM EST

## 2025-01-17 NOTE — PLAN OF CARE
Problem: Chronic Conditions and Co-morbidities  Goal: Patient's chronic conditions and co-morbidity symptoms are monitored and maintained or improved  Outcome: Progressing  Flowsheets (Taken 1/17/2025 0730)  Care Plan - Patient's Chronic Conditions and Co-Morbidity Symptoms are Monitored and Maintained or Improved:   Monitor and assess patient's chronic conditions and comorbid symptoms for stability, deterioration, or improvement   Collaborate with multidisciplinary team to address chronic and comorbid conditions and prevent exacerbation or deterioration     Problem: Respiratory - Adult  Goal: Achieves optimal ventilation and oxygenation  Outcome: Progressing     Problem: Cardiovascular - Adult  Goal: Maintains optimal cardiac output and hemodynamic stability  Outcome: Progressing  Goal: Absence of cardiac dysrhythmias or at baseline  Outcome: Progressing     Problem: Musculoskeletal - Adult  Goal: Return mobility to safest level of function  Outcome: Progressing  Flowsheets (Taken 1/17/2025 0730)  Return Mobility to Safest Level of Function:   Assess patient stability and activity tolerance for standing, transferring and ambulating with or without assistive devices   Assist with transfers and ambulation using safe patient handling equipment as needed   Ensure adequate protection for wounds/incisions during mobilization   Obtain physical therapy/occupational therapy consults as needed  Goal: Maintain proper alignment of affected body part  Outcome: Progressing  Flowsheets (Taken 1/17/2025 0730)  Maintain proper alignment of affected body part:   Support and protect limb and body alignment per provider's orders   Instruct and reinforce with patient and family use of appropriate assistive device and precautions (e.g. spinal or hip dislocation precautions)

## 2025-01-17 NOTE — PROGRESS NOTES
NAME:  Frank Guerrero  YOB: 1955  MEDICAL RECORD NUMBER:  [id number]    Shift Summary: 2L oxygen overnight for sleep apnea. PCI this morning.     Family updated: Yes:  at bedside in a.m.     Rhythm: Normal Sinus Rhythm     Most recent vitals:   Visit Vitals  BP (!) 140/80   Pulse 81   Temp 97.5 °F (36.4 °C) (Axillary)   Resp 16   Ht 1.702 m (5' 7\")   Wt 106.9 kg (235 lb 10.8 oz)   SpO2 91%   BMI 36.91 kg/m²      ABP (Arterial line BP): 112/75  ABP Mean (Arterial Line Mean): 90 mmHg    No data found.    No data found.      Respiratory support needed (if any):  - RA    Admission weight Weight - Scale: 106.3 kg (234 lb 5.6 oz) (01/16/25 1151)    Today's weight    Wt Readings from Last 1 Encounters:   01/17/25 106.9 kg (235 lb 10.8 oz)        Grady need assessed each shift: N/A - no grady present  UOP >30ml/hr: YES  Last documented BM (in last 48 hrs):  Patient Vitals for the past 48 hrs:   Last BM (including prior to admit) Stool Occurrence   01/16/25 0845 01/15/25 --   01/16/25 1730 01/15/25 1                Restraints (in use currently or dc'd in last 12 hrs): No    Order current and documentation up to date? No    Lines/Drains reviewed @ bedside.  Peripheral IV 01/16/25 Left Antecubital (Active)   Number of days: 0         Drip rates at handoff:    sodium chloride      sodium chloride         Lab Data:   CBC: No results for input(s): \"WBC\", \"HGB\", \"HCT\", \"MCV\", \"PLT\" in the last 72 hours.  BMP:    Recent Labs     01/16/25  0706 01/17/25  0343    135*   K 4.4 4.3   CO2 26 26   BUN 24* 21*   CREATININE 1.3 1.2     LIVR: No results for input(s): \"AST\", \"ALT\" in the last 72 hours.  PT/INR: No results for input(s): \"INR\" in the last 72 hours.    Invalid input(s): \"PROT\"  APTT: No results for input(s): \"APTT\" in the last 72 hours.  ABG: No results for input(s): \"PHART\", \"[id number]\", \"PO2ART\" in the last 72 hours.    Any consults during the shift? No    Any signed and held orders to be released?   No        4 Eyes Skin Assessment       The patient is being assessed for  Shift Handoff    I agree that at least one RN has performed a thorough Head to Toe Skin Assessment on the patient. ALL assessment sites listed below have been assessed.      Areas assessed by both nurses: Head, Face, Ears, Shoulders, Back, Chest, Arms, Elbows, Hands, Sacrum. Buttock, Coccyx, Ischium, Legs. Feet and Heels, and Under Medical Devices         Does the Patient have a Wound? No noted wound(s)    Wound Care Orders initiated by RN: No       Edwin Prevention initiated by RN: Yes    Pressure Injury (Stage 3,4, Unstageable, DTI, NWPT, and Complex wounds) if present, place Wound referral order by RN under : No    New Ostomies, if present place, Ostomy referral order under : No     Nurse 1 eSignature: Electronically signed by Sobeida Mccollum RN on 1/17/25 at 7:22 AM EST    **SHARE this note so that the co-signing nurse can place an eSignature**    Nurse 2 eSignature: Electronically signed by Billie Pace RN on 1/17/25 at 7:29 AM EST

## 2025-01-17 NOTE — PROGRESS NOTES
Discharge instructions reviewed through  with patient and family member.  Patient and family verbalized understanding.  All home medications have been reviewed, questions answered and patient voiced understanding. All medication side effects reviewed and patient and family verbalized understanding. Follow up appointment(s) reviewed with patient and all attempts made to schedule within 7-10 days of discharge.  Patient given prescriptions, discharge instructions, and appointment times.  Patient discharged to home with family via private car.  Taken to lobby via wheelchair.      Electronically signed by Billie Pace RN on 1/17/2025 at 5:52 PM

## 2025-01-17 NOTE — BRIEF OP NOTE
Brief Postoperative Note      Patient: Frank Guerrero  YOB: 1955  MRN: [id number]    Date of Procedure: 1/16/2025    Pre-Op Diagnosis Codes:      * Chronic systolic heart failure (HCC) [I50.22]     * CAD (coronary artery disease) [I25.10]    Post-Op Diagnosis: Same       Procedure(s):  Left and right heart cath / coronary angiography    Surgeon(s):  Miki Santillan MD    Assistant:  * No surgical staff found *    Anesthesia: IV Sedation    Estimated Blood Loss (mL): Minimal    Complications: None    Findings:  Mid LAD 95% stenosis  Admit for aspirin desensitization and plan for mid LAD PCI once able to tolerate aspirin     Electronically signed by Miki Santillan MD on 1/16/2025 at 9:33 PM

## 2025-01-17 NOTE — PLAN OF CARE
Problem: Chronic Conditions and Co-morbidities  Goal: Patient's chronic conditions and co-morbidity symptoms are monitored and maintained or improved  1/17/2025 1749 by Billie Pace RN  Outcome: Adequate for Discharge  1/17/2025 1749 by Billie Pace RN  Outcome: Progressing  Flowsheets (Taken 1/17/2025 0730)  Care Plan - Patient's Chronic Conditions and Co-Morbidity Symptoms are Monitored and Maintained or Improved:   Monitor and assess patient's chronic conditions and comorbid symptoms for stability, deterioration, or improvement   Collaborate with multidisciplinary team to address chronic and comorbid conditions and prevent exacerbation or deterioration     Problem: Respiratory - Adult  Goal: Achieves optimal ventilation and oxygenation  1/17/2025 1749 by Billie Pace RN  Outcome: Adequate for Discharge  1/17/2025 1749 by Billie Pace RN  Outcome: Progressing     Problem: Cardiovascular - Adult  Goal: Maintains optimal cardiac output and hemodynamic stability  1/17/2025 1749 by Billie Pace RN  Outcome: Adequate for Discharge  1/17/2025 1749 by Billie Pace RN  Outcome: Progressing  Goal: Absence of cardiac dysrhythmias or at baseline  1/17/2025 1749 by Billie Pace RN  Outcome: Adequate for Discharge  1/17/2025 1749 by Billie Pace RN  Outcome: Progressing     Problem: Musculoskeletal - Adult  Goal: Return mobility to safest level of function  1/17/2025 1749 by Billie Pace RN  Outcome: Adequate for Discharge  1/17/2025 1749 by Billie Pace RN  Outcome: Progressing  Flowsheets (Taken 1/17/2025 0730)  Return Mobility to Safest Level of Function:   Assess patient stability and activity tolerance for standing, transferring and ambulating with or without assistive devices   Assist with transfers and ambulation using safe patient handling equipment as needed   Ensure adequate protection for wounds/incisions during mobilization   Obtain physical

## 2025-01-17 NOTE — CARE COORDINATION
DC order noted.  Spoke with bedside RN Billie who denied any concerns for dc at this time.    RAMY De Dios     Case Management   [phone]    1/17/2025  5:00 PM

## 2025-01-17 NOTE — PROGRESS NOTES
TR band removed. Hemostasis achieved. R radial site soft and no hematoma or oozing noted.  Occlusive dressing dry and intact.  Pedal pulses easily palpated.      Electronically signed by Billie Pace RN on 1/17/2025 at 11:00 AM

## 2025-01-20 ENCOUNTER — TELEPHONE (OUTPATIENT)
Dept: INTERNAL MEDICINE CLINIC | Age: 70
End: 2025-01-20

## 2025-01-20 LAB — ECHO BSA: 2.24 M2

## 2025-01-20 NOTE — TELEPHONE ENCOUNTER
Patient declined hospital follow up at this time since he has one scheduled on 1/27/25 with cardiology.

## 2025-01-20 NOTE — TELEPHONE ENCOUNTER
Care Transitions Initial Follow Up Call    Outreach made within 2 business days of discharge: Yes    Patient: Frank Guerrero Patient : 1955   MRN: [id number]  Reason for Admission: CAD  Discharge Date: 25       Spoke with: Pt ( Services Used: Kori ID#[id number])    Discharge department/facility: [facility]    TCM Interactive Patient Contact:  Was patient able to fill all prescriptions: Yes  Was patient instructed to bring all medications to the follow-up visit: Yes  Is patient taking all medications as directed in the discharge summary? Yes  Does patient understand their discharge instructions: Yes  Does patient have questions or concerns that need addressed prior to 7-14 day follow up office visit: no    Additional needs identified to be addressed with provider               Scheduled appointment with PCP within 7-14 days    Follow Up  Future Appointments   Date Time Provider Department Center   2025 10:15 AM Carl Hines MD [facility]   2025  8:15 AM [facility] ROOM [address] [place] [address]   4/10/2025  8:40 AM ABA Herzog MD [facility]   2025  9:15 AM Carl Hines MD [facility]       SYED [place] MA

## 2025-01-21 NOTE — PROGRESS NOTES
[facility]      Cardiology Consult    Frank Guerrero  1955 January 27, 2025    Referring Physician: ABA Herzog MD  Reason for Referral: CAD, ischemic cardiomyopathy     CC: \"more short of breath\"     HPI:  The patient is 69 y.o. male with a past medical history significant for hypertension, hyperlipidemia, ischemic cardiomyopathy, and coronary artery disease who presents today for management of heart failure. He was initially seen for a pre-operative risk assessment prior to a colonoscopy scheduled 6/27/22. He reported a history of a prior MI in 2020 while living in [place] but states a coronary angiogram was not performed. He stated he would be treated with medical therapy. A prior stress test was completed 3/2021 showed a large fixed defect with an LVEF of 28%. He denied a prior echocardiogram. He reported a history of gastric ulcers and follows with GI. He completed an echocardiogram that showed a severely reduced LVEF at 25%. He was started medical therapy but unfortunately his repeat echocardiogram showed the EF had not recovered at 20-25%. He was referred to Dr. Bunch and underwent ICD placement 11/21/22.     His repeat echocardiogram revealed a LVEFof 20-25%, severely dilated LV, new apical thrombus, and wide QRS on rhythm strip. He was started on DOAC. He saw Dr. Bunch in office 1/8/25 with no further recommendations advised. He underwent a left/right heart catheterization resulting in x1 YANI to the LAD.     The  phone is used it today's office visit. He reports shortness of breath that has been progressively worsening over the past month. He states he endorsed worsening shortness of breath prior to his EGD. He describes orthopnea but denies weight changes or LE edema. He states he does feel better when he takes a dose of Lasix. He continues to exercise and denies associated chest pains. He states he takes Lasix a few times per week. He states he

## 2025-01-22 ENCOUNTER — TELEPHONE (OUTPATIENT)
Dept: CARDIOLOGY CLINIC | Age: 70
End: 2025-01-22

## 2025-01-22 NOTE — TELEPHONE ENCOUNTER
Frank states that he will be dropping off [facility] paperwork tmrw 1/23 for completion. Already informed it will take 7-14 business days. He v/u.    Interpretor line was used - ID [id number].

## 2025-01-23 ENCOUNTER — TELEPHONE (OUTPATIENT)
Dept: CARDIOLOGY CLINIC | Age: 70
End: 2025-01-23

## 2025-01-23 DIAGNOSIS — I25.5 ISCHEMIC CARDIOMYOPATHY: ICD-10-CM

## 2025-01-23 NOTE — TELEPHONE ENCOUNTER
Frank dropped off paperwork for FMLA to be filled out place in Dr. Hines folder.        Call back number [phone]

## 2025-01-23 NOTE — TELEPHONE ENCOUNTER
Valente RN - patient has OV with Dr. Hines on 1/27/25. Will you verify the exact dates for [place] paperwork and place a post-it note with dates on his paperwork, and let patient know I will fill out upon my return next week? Thank you!

## 2025-01-24 ENCOUNTER — TELEPHONE (OUTPATIENT)
Dept: INFUSION THERAPY | Age: 70
End: 2025-01-24

## 2025-01-24 DIAGNOSIS — E78.5 HYPERLIPIDEMIA, UNSPECIFIED HYPERLIPIDEMIA TYPE: Primary | ICD-10-CM

## 2025-01-24 NOTE — TELEPHONE ENCOUNTER
Called and spoke to patient about confirming his appt for 2/5/25 @ 8:15 am at [facility] using [employer]. Also reminded him to have fasting labs drawn next week at [facility].

## 2025-01-27 ENCOUNTER — OFFICE VISIT (OUTPATIENT)
Dept: CARDIOLOGY CLINIC | Age: 70
End: 2025-01-27
Payer: COMMERCIAL

## 2025-01-27 VITALS
HEART RATE: 70 BPM | HEIGHT: 67 IN | BODY MASS INDEX: 37.2 KG/M2 | SYSTOLIC BLOOD PRESSURE: 106 MMHG | OXYGEN SATURATION: 95 % | DIASTOLIC BLOOD PRESSURE: 60 MMHG | WEIGHT: 237 LBS

## 2025-01-27 DIAGNOSIS — I51.3 APICAL MURAL THROMBUS: ICD-10-CM

## 2025-01-27 DIAGNOSIS — E78.5 HYPERLIPIDEMIA, UNSPECIFIED HYPERLIPIDEMIA TYPE: ICD-10-CM

## 2025-01-27 DIAGNOSIS — I50.22 CHRONIC SYSTOLIC HEART FAILURE (HCC): Primary | ICD-10-CM

## 2025-01-27 DIAGNOSIS — I25.10 CORONARY ARTERY DISEASE INVOLVING NATIVE CORONARY ARTERY OF NATIVE HEART WITHOUT ANGINA PECTORIS: ICD-10-CM

## 2025-01-27 DIAGNOSIS — I25.5 ISCHEMIC CARDIOMYOPATHY: ICD-10-CM

## 2025-01-27 DIAGNOSIS — Z95.810 ICD (IMPLANTABLE CARDIOVERTER-DEFIBRILLATOR) IN PLACE: ICD-10-CM

## 2025-01-27 PROCEDURE — G8417 CALC BMI ABV UP PARAM F/U: HCPCS | Performed by: INTERNAL MEDICINE

## 2025-01-27 PROCEDURE — G8427 DOCREV CUR MEDS BY ELIG CLIN: HCPCS | Performed by: INTERNAL MEDICINE

## 2025-01-27 PROCEDURE — 1036F TOBACCO NON-USER: CPT | Performed by: INTERNAL MEDICINE

## 2025-01-27 PROCEDURE — 1111F DSCHRG MED/CURRENT MED MERGE: CPT | Performed by: INTERNAL MEDICINE

## 2025-01-27 PROCEDURE — 93000 ELECTROCARDIOGRAM COMPLETE: CPT | Performed by: INTERNAL MEDICINE

## 2025-01-27 PROCEDURE — 3078F DIAST BP <80 MM HG: CPT | Performed by: INTERNAL MEDICINE

## 2025-01-27 PROCEDURE — 1123F ACP DISCUSS/DSCN MKR DOCD: CPT | Performed by: INTERNAL MEDICINE

## 2025-01-27 PROCEDURE — 3074F SYST BP LT 130 MM HG: CPT | Performed by: INTERNAL MEDICINE

## 2025-01-27 PROCEDURE — 99214 OFFICE O/P EST MOD 30 MIN: CPT | Performed by: INTERNAL MEDICINE

## 2025-01-27 PROCEDURE — 3017F COLORECTAL CA SCREEN DOC REV: CPT | Performed by: INTERNAL MEDICINE

## 2025-01-27 RX ORDER — FAMOTIDINE 20 MG/1
20 TABLET, FILM COATED ORAL 2 TIMES DAILY PRN
COMMUNITY

## 2025-01-27 NOTE — PROGRESS NOTES
[facility]      Cardiology Consult    Frank Guerrero  1955 January 27, 2025    Referring Physician: ABA Herzog MD  Reason for Referral: CAD, ischemic cardiomyopathy     CC: \"Breathing is doing okay\"     HPI:  The patient is 69 y.o. male with a past medical history significant for hypertension, hyperlipidemia, ischemic cardiomyopathy, and coronary artery disease who presents today for management of heart failure. He was initially seen for a pre-operative risk assessment prior to a colonoscopy scheduled 6/27/22. He reported a history of a prior MI in 2020 while living in [place] but states a coronary angiogram was not performed. He stated he would be treated with medical therapy. A prior stress test was completed 3/2021 showed a large fixed defect with an LVEF of 28%. He denied a prior echocardiogram. He reported a history of gastric ulcers and follows with GI. He completed an echocardiogram that showed a severely reduced LVEF at 25%. He was started medical therapy but unfortunately his repeat echocardiogram showed the EF had not recovered at 20-25%. He was referred to Dr. Bunch and underwent ICD placement 11/21/22.     His repeat echocardiogram revealed a LVEFof 20-25%, severely dilated LV, new apical thrombus, and IVCD on rhythm strip. He was started on DOAC. He saw Dr. Bunch in office 1/8/25 with no further recommendations advised. He underwent a left/right heart catheterization resulting in x1 YANI to the LAD with normal filling pressures.     The  phone is used it today's office visit. He states his breathing is \"doing okay.\" Patient denies exertional chest pain/pressure, dyspnea at rest, worsening SEGAL, PND, orthopnea, lightheadedness, weight changes, changes in LE edema, and syncope. No bleeding concerns and currently on triple therapy.     Past Medical History:   Diagnosis Date    CAD (coronary artery disease)     Hyperlipidemia     Hypertension     Ischemic

## 2025-01-28 LAB
ALBUMIN SERPL-MCNC: 4.3 G/DL (ref 3.4–5)
ALP SERPL-CCNC: 91 U/L (ref 40–129)
ALT SERPL-CCNC: 33 U/L (ref 10–40)
AST SERPL-CCNC: 24 U/L (ref 15–37)
BILIRUB DIRECT SERPL-MCNC: <0.1 MG/DL (ref 0–0.3)
BILIRUB INDIRECT SERPL-MCNC: NORMAL MG/DL (ref 0–1)
BILIRUB SERPL-MCNC: <0.2 MG/DL (ref 0–1)
CHOLEST SERPL-MCNC: 196 MG/DL (ref 0–199)
HDLC SERPL-MCNC: 35 MG/DL (ref 40–60)
LDLC SERPL CALC-MCNC: ABNORMAL MG/DL
LDLC SERPL-MCNC: 72 MG/DL
PROT SERPL-MCNC: 7.3 G/DL (ref 6.4–8.2)
TRIGL SERPL-MCNC: 365 MG/DL (ref 0–150)
VLDLC SERPL CALC-MCNC: ABNORMAL MG/DL

## 2025-01-29 RX ORDER — ROSUVASTATIN CALCIUM 40 MG/1
40 TABLET, COATED ORAL DAILY
Qty: 90 TABLET | Refills: 3 | Status: SHIPPED | OUTPATIENT
Start: 2025-01-29

## 2025-01-29 NOTE — TELEPHONE ENCOUNTER
JEFFERYI:  Called Susy kumar) OK per HIPAA.  Relayed message from Miriam VELAZQUEZ.  They will  paper work today at [facility] office today before 4:00 PM>

## 2025-01-29 NOTE — TELEPHONE ENCOUNTER
Paperwork completed. Please see if patient is able to  today at [place] office (he lives in [place]). Thank you!

## 2025-01-29 NOTE — TELEPHONE ENCOUNTER
Pt called office again and would prefer that we fax paper work to company. Please see media looks like this has been taken care of.     Called pt to let him know. He v/u and would like to know if Dr. Hines can prescribe him protonix. Advised him this may need to come from pcp. He v/u and would still like me to ask. Please advise.

## 2025-01-29 NOTE — TELEPHONE ENCOUNTER
Group Therapy Documentation     Was the patient seen in-person or via Telemedicine (if in-person skip to Group Note): Telemedicine    If Telemedicine: The patient's condition can be safely assessed and treated via synchronous audio and visual telemedicine encounter. ? ?      Reason for Telemedicine Visit: Services only offered telehealth    Originating Site (Patient Location): Patient's home    Distant Site (Provider Location): Provider Remote Setting- Home Office    Consent: ?The patient/guardian has verbally consented to: the potential risks and benefits of telemedicine (video visit) versus in person care; bill my insurance or make self-payment for services provided; and responsibility for payment of non-covered services.       Mode of Communication:??Video Conference via Zoom      As the provider I attest to compliance with applicable laws and regulations related to telemedicine.      Counselor verified Patient with 2-point verification: Patient is known to provider      Patient attended a video group session on the following days: ?          GROUP NOTE:  DATE OF SERVICE:  February 2, 2023    START TIME:  5:30pm    END TIME:  7:23pm    Group Length:   113 minutes    FACILITATOR(S):    Nirali CEDILLO CGC     TOPIC: BEH Group Therapy, Problem Gambling Group     Number of patients attending the group: 8    Group Therapy Type:  Problem Gambling Group    Group Attendance:  Client attended group     Summary of Group / Topics Discussed:   Group started with a revisit on topics discussed on Tuesday and about speaker from Wednesday group. Patient's each took turns sharing two self-care activities they would like to do this weekend. Group went over the chapter on anger from the No-Dice workbook. This was followed by discussion. Patients watched a short video on how to assemble an anger thermometry to help identify their stages of anger.      Patient's response to the group topic/interactions:    Patient appeared to gain a  Paperwork has been faxed, however there is still parts that need to be filled out by patient. Patient should call GI for prescription. Thank you.    better awareness of their stages of anger.       Client specific details:     Patient shared with group he is working on step 4 with GA. Patient also shared he will be attending GA meetings and going to the gym this weekend for self care.     Nirali Briceño LADMARY, CGC

## 2025-01-31 NOTE — TELEPHONE ENCOUNTER
Called pt regarding message below he v/u and will go today to  paperwork and will reach out to his pcp regarding protonix.

## 2025-02-05 ENCOUNTER — HOSPITAL ENCOUNTER (OUTPATIENT)
Dept: INFUSION THERAPY | Age: 70
Setting detail: INFUSION SERIES
Discharge: HOME | End: 2025-02-05
Payer: COMMERCIAL

## 2025-02-05 VITALS
RESPIRATION RATE: 16 BRPM | DIASTOLIC BLOOD PRESSURE: 78 MMHG | OXYGEN SATURATION: 96 % | TEMPERATURE: 98.1 F | HEART RATE: 74 BPM | SYSTOLIC BLOOD PRESSURE: 133 MMHG

## 2025-02-05 DIAGNOSIS — I25.10 CORONARY ARTERY DISEASE INVOLVING NATIVE CORONARY ARTERY OF NATIVE HEART WITHOUT ANGINA PECTORIS: ICD-10-CM

## 2025-02-05 DIAGNOSIS — I25.5 ISCHEMIC CARDIOMYOPATHY: ICD-10-CM

## 2025-02-05 DIAGNOSIS — E78.5 HYPERLIPIDEMIA, UNSPECIFIED HYPERLIPIDEMIA TYPE: Primary | ICD-10-CM

## 2025-02-05 DIAGNOSIS — E78.00 PURE HYPERCHOLESTEROLEMIA: ICD-10-CM

## 2025-02-05 PROCEDURE — 96372 THER/PROPH/DIAG INJ SC/IM: CPT

## 2025-02-05 PROCEDURE — 6360000002 HC RX W HCPCS: Performed by: INTERNAL MEDICINE

## 2025-02-05 RX ORDER — HYDROCORTISONE SODIUM SUCCINATE 100 MG/2ML
100 INJECTION INTRAMUSCULAR; INTRAVENOUS
OUTPATIENT
Start: 2025-04-30

## 2025-02-05 RX ORDER — EPINEPHRINE 1 MG/ML
0.3 INJECTION, SOLUTION, CONCENTRATE INTRAVENOUS PRN
OUTPATIENT
Start: 2025-04-30

## 2025-02-05 RX ORDER — ALBUTEROL SULFATE 90 UG/1
4 INHALANT RESPIRATORY (INHALATION) PRN
OUTPATIENT
Start: 2025-04-30

## 2025-02-05 RX ORDER — ONDANSETRON 2 MG/ML
8 INJECTION INTRAMUSCULAR; INTRAVENOUS
OUTPATIENT
Start: 2025-04-30

## 2025-02-05 RX ORDER — SODIUM CHLORIDE 9 MG/ML
INJECTION, SOLUTION INTRAVENOUS CONTINUOUS
OUTPATIENT
Start: 2025-04-30

## 2025-02-05 RX ORDER — DIPHENHYDRAMINE HYDROCHLORIDE 50 MG/ML
50 INJECTION INTRAMUSCULAR; INTRAVENOUS
OUTPATIENT
Start: 2025-04-30

## 2025-02-05 RX ORDER — ACETAMINOPHEN 325 MG/1
650 TABLET ORAL
OUTPATIENT
Start: 2025-04-30

## 2025-02-05 RX ADMIN — INCLISIRAN 284 MG: 284 INJECTION, SOLUTION SUBCUTANEOUS at 08:50

## 2025-02-05 NOTE — PROGRESS NOTES
Outpatient Infusion Center  [facility]     LEQVIO (Inclisiran) VISIT    NAME:  Frank Guerrero  YOB: 1955  MEDICAL RECORD NUMBER:  [id number]  Episode Date:  2/5/2025    Patient arrived to Outpatient Infusion Center    [] per wheelchair   [x] ambulatory     Alert and oriented x 4. Patient declined  when brought to room. Answered all questions appropriately. States saw Dr. Hines 1/27/25. All meds up to date. Able to verbalize information re Leqvio use, cholesterol levels, and ways to decrease cholesterol.     /78   Pulse 74   Temp 98.1 °F (36.7 °C) (Oral)   Resp 16     ICD-10-CM Primary Diagnosis Code:   [] E78.0 Pure Hypercholesterolemia (including HeFH)   [] E78.2 Mixed Hyperlipidemia   []  E78.4 Other Hyperlipidemia   [x] E78.5 Hyperlipidemia,    [] Unspecified Other:  ASCVD diagnosis: CAD I25.10      Has patient reached LDL-C target (<70 mg/dL)?   [x] Yes     [] No      Patient Active Problem List   Diagnosis    Ischemic cardiomyopathy    Nocturnal dyspnea    Gastroesophageal reflux disease with esophagitis without hemorrhage    Hiatal hernia    Coronary artery disease involving native coronary artery of native heart without angina pectoris    Essential hypertension    Language barrier to communication    Pure hypercholesterolemia    Abnormal findings on esophagogastroduodenoscopy (EGD)    Cardiac defibrillator in situ    Symptomatic bradycardia    Sinus node dysfunction (HCC)    AV heart block    Chronic systolic HF (heart failure) (HCC)    Chronic congestive heart failure (HCC)    Hyperlipemia    Chronic systolic heart failure (HCC)    CAD (coronary artery disease)    CAD in native artery       PAST MEDICAL HISTORY        Diagnosis Date    CAD (coronary artery disease)     Hyperlipidemia     Hypertension     Ischemic cardiomyopathy     MI (myocardial infarction) (HCC)        Most recent LDL-C 1/27/25 total cholesterol= 196 no readings for LDL due to < 30

## 2025-02-05 NOTE — DISCHARGE INSTRUCTIONS
Outpatient Infusion Discharge Instructions  [facility]  [address] [address]  Telephone: [phone]      FAX [phone]    NAME:  Frank Guerrero  YOB: 1955  MEDICAL RECORD NUMBER:  [id number]  DATE:  @ED@    Reason for Outpatient Infusion Visit: Leqvio    If you develop any these symptoms please contact you Doctor    [] Nausea and/or vomiting not relieved with medication   [x] Swelling, redness, and/or bleeding at injection or IV site    [] Fever or chills  [] Rash or itching   [] Shortness of breath  [x] Please review After Visit Summary (AVS) information on  Leqvio  [x] Other Next appointment in 6 months      Outpatient Infusion Center Information: Should you experience any significant changes in your health or have questions about your care please contact the [facility] at [phone] MONDAY - FRIDAY 8:00 am - 4:00 pm.  If you need help outside these hours and cannot wait until we are again available, contact your Primary Care Physician or go to the hospital emergency room.       Electronically signed by ULISES BUSTAMANTE RN on 2/5/2025 at 8:51 AM

## 2025-02-07 DIAGNOSIS — R09.82 POST-NASAL DRAINAGE: ICD-10-CM

## 2025-02-07 RX ORDER — FLUTICASONE PROPIONATE 50 MCG
SPRAY, SUSPENSION (ML) NASAL
Qty: 16 G | Refills: 0 | Status: SHIPPED | OUTPATIENT
Start: 2025-02-07

## 2025-02-10 RX ORDER — ALBUTEROL SULFATE 90 UG/1
INHALANT RESPIRATORY (INHALATION)
Qty: 6.7 G | Refills: 1 | Status: SHIPPED | OUTPATIENT
Start: 2025-02-10

## 2025-02-10 NOTE — TELEPHONE ENCOUNTER
Last OV: 1/27/25  Next OV: 6/18/25  Last refill: 12/18/24 # 6.7 g 1 R/F  Most recent Labs: 1/27/25  Last EKG (if needed):1/27/25

## 2025-03-20 DIAGNOSIS — R09.82 POST-NASAL DRAINAGE: ICD-10-CM

## 2025-03-20 RX ORDER — FLUTICASONE PROPIONATE 50 MCG
SPRAY, SUSPENSION (ML) NASAL
Qty: 48 G | OUTPATIENT
Start: 2025-03-20

## 2025-03-20 RX ORDER — FLUTICASONE PROPIONATE 50 MCG
SPRAY, SUSPENSION (ML) NASAL
Qty: 16 G | Refills: 0 | Status: SHIPPED | OUTPATIENT
Start: 2025-03-20

## 2025-04-10 ENCOUNTER — OFFICE VISIT (OUTPATIENT)
Dept: INTERNAL MEDICINE CLINIC | Age: 70
End: 2025-04-10
Payer: COMMERCIAL

## 2025-04-10 VITALS
HEIGHT: 67 IN | BODY MASS INDEX: 37.86 KG/M2 | OXYGEN SATURATION: 97 % | SYSTOLIC BLOOD PRESSURE: 116 MMHG | DIASTOLIC BLOOD PRESSURE: 86 MMHG | HEART RATE: 67 BPM | WEIGHT: 241.2 LBS

## 2025-04-10 DIAGNOSIS — I10 ESSENTIAL HYPERTENSION: Primary | ICD-10-CM

## 2025-04-10 DIAGNOSIS — Z12.5 SCREENING FOR PROSTATE CANCER: ICD-10-CM

## 2025-04-10 DIAGNOSIS — I25.5 ISCHEMIC CARDIOMYOPATHY: ICD-10-CM

## 2025-04-10 DIAGNOSIS — Z95.5 HISTORY OF PLACEMENT OF STENT IN LAD CORONARY ARTERY: ICD-10-CM

## 2025-04-10 DIAGNOSIS — R73.03 PREDIABETES: ICD-10-CM

## 2025-04-10 DIAGNOSIS — K21.9 GASTROESOPHAGEAL REFLUX DISEASE WITHOUT ESOPHAGITIS: ICD-10-CM

## 2025-04-10 DIAGNOSIS — I10 ESSENTIAL HYPERTENSION: ICD-10-CM

## 2025-04-10 DIAGNOSIS — I50.9 CHRONIC CONGESTIVE HEART FAILURE, UNSPECIFIED HEART FAILURE TYPE (HCC): ICD-10-CM

## 2025-04-10 DIAGNOSIS — Z60.3 LANGUAGE BARRIER AFFECTING HEALTH CARE: ICD-10-CM

## 2025-04-10 DIAGNOSIS — I25.10 CORONARY ARTERY DISEASE INVOLVING NATIVE CORONARY ARTERY OF NATIVE HEART WITHOUT ANGINA PECTORIS: ICD-10-CM

## 2025-04-10 DIAGNOSIS — Z75.8 LANGUAGE BARRIER AFFECTING HEALTH CARE: ICD-10-CM

## 2025-04-10 DIAGNOSIS — K29.30 CHRONIC SUPERFICIAL GASTRITIS WITHOUT BLEEDING: ICD-10-CM

## 2025-04-10 LAB
ANION GAP SERPL CALCULATED.3IONS-SCNC: 10 MMOL/L (ref 3–16)
BUN SERPL-MCNC: 19 MG/DL (ref 7–20)
CALCIUM SERPL-MCNC: 9.9 MG/DL (ref 8.3–10.6)
CHLORIDE SERPL-SCNC: 103 MMOL/L (ref 99–110)
CO2 SERPL-SCNC: 27 MMOL/L (ref 21–32)
CREAT SERPL-MCNC: 1.4 MG/DL (ref 0.8–1.3)
DEPRECATED RDW RBC AUTO: 14.5 % (ref 12.4–15.4)
EST. AVERAGE GLUCOSE BLD GHB EST-MCNC: 122.6 MG/DL
GFR SERPLBLD CREATININE-BSD FMLA CKD-EPI: 55 ML/MIN/{1.73_M2}
GLUCOSE SERPL-MCNC: 97 MG/DL (ref 70–99)
HBA1C MFR BLD: 5.9 %
HCT VFR BLD AUTO: 49.8 % (ref 40.5–52.5)
HGB BLD-MCNC: 16.2 G/DL (ref 13.5–17.5)
MCH RBC QN AUTO: 29.9 PG (ref 26–34)
MCHC RBC AUTO-ENTMCNC: 32.6 G/DL (ref 31–36)
MCV RBC AUTO: 91.7 FL (ref 80–100)
PLATELET # BLD AUTO: 217 K/UL (ref 135–450)
PMV BLD AUTO: 9.2 FL (ref 5–10.5)
POTASSIUM SERPL-SCNC: 4.7 MMOL/L (ref 3.5–5.1)
PSA SERPL DL<=0.01 NG/ML-MCNC: 1.01 NG/ML (ref 0–4)
RBC # BLD AUTO: 5.43 M/UL (ref 4.2–5.9)
SODIUM SERPL-SCNC: 140 MMOL/L (ref 136–145)
WBC # BLD AUTO: 7.3 K/UL (ref 4–11)

## 2025-04-10 PROCEDURE — G2211 COMPLEX E/M VISIT ADD ON: HCPCS | Performed by: INTERNAL MEDICINE

## 2025-04-10 PROCEDURE — G8427 DOCREV CUR MEDS BY ELIG CLIN: HCPCS | Performed by: INTERNAL MEDICINE

## 2025-04-10 PROCEDURE — G8417 CALC BMI ABV UP PARAM F/U: HCPCS | Performed by: INTERNAL MEDICINE

## 2025-04-10 PROCEDURE — 1123F ACP DISCUSS/DSCN MKR DOCD: CPT | Performed by: INTERNAL MEDICINE

## 2025-04-10 PROCEDURE — 3074F SYST BP LT 130 MM HG: CPT | Performed by: INTERNAL MEDICINE

## 2025-04-10 PROCEDURE — 99214 OFFICE O/P EST MOD 30 MIN: CPT | Performed by: INTERNAL MEDICINE

## 2025-04-10 PROCEDURE — 1036F TOBACCO NON-USER: CPT | Performed by: INTERNAL MEDICINE

## 2025-04-10 PROCEDURE — 3017F COLORECTAL CA SCREEN DOC REV: CPT | Performed by: INTERNAL MEDICINE

## 2025-04-10 PROCEDURE — 3079F DIAST BP 80-89 MM HG: CPT | Performed by: INTERNAL MEDICINE

## 2025-04-10 RX ORDER — PANTOPRAZOLE SODIUM 40 MG/1
40 TABLET, DELAYED RELEASE ORAL
Qty: 90 TABLET | Refills: 1 | Status: SHIPPED | OUTPATIENT
Start: 2025-04-10

## 2025-04-10 SDOH — SOCIAL STABILITY - SOCIAL INSECURITY: ACCULTURATION DIFFICULTY: Z60.3

## 2025-04-10 ASSESSMENT — ENCOUNTER SYMPTOMS
PHOTOPHOBIA: 0
CHEST TIGHTNESS: 0
ABDOMINAL PAIN: 0
WHEEZING: 0

## 2025-04-10 NOTE — PROGRESS NOTES
Frank Aldo Guerrero  1955  male  69 y.o.    SUBJECTIVE:    Chief Complaint   Patient presents with    3 Month Follow-Up     Patient is here for a 3 month follow up, no other concerns.     Services Used (Flash ID#[id number])       History of Present Illness  Follow-up visit for chronic problems.  Patient denies any worsening cough, shortness of breath, wheezing from baseline.  He uses albuterol as needed.  Patient continue to follow-up with cardiologist.  Since last visit patient reported he had  cardiac cath and told to have everything came back fine.    Cardiac catheter report is reviewed.  Apparently patient had cardiac stenting in the left anterior descending branch.    Overall he is allergy and sinus symptom has been stable with Claritin and Flonase      Past Medical History:   Diagnosis Date    CAD (coronary artery disease)     Hyperlipidemia     Hypertension     Ischemic cardiomyopathy     MI (myocardial infarction) (HCC)      Past Surgical History:   Procedure Laterality Date    CARDIAC DEFIBRILLATOR PLACEMENT      CARDIAC PROCEDURE N/A 1/17/2025    Percutaneous coronary intervention performed by Miki Santillan MD at [facility] CARDIAC CATH LAB    CARDIAC PROCEDURE N/A 1/16/2025    Left and right heart cath / coronary angiography performed by Miki Santillan MD at [facility] CARDIAC CATH LAB    UPPER GASTROINTESTINAL ENDOSCOPY N/A 10/24/2024    ESOPHAGOGASTRODUODENOSCOPY BIOPSY small bowel bx r/o celiac gastric bx eval gastritis and r/o HP gastric nodule bx performed by Yanira Hewitt MD at [facility] ENDOSCOPY     Social History     Socioeconomic History    Marital status: Single     Spouse name: None    Number of children: None    Years of education: None    Highest education level: None   Tobacco Use    Smoking status: Never    Smokeless tobacco: Never   Vaping Use    Vaping status: Never Used   Substance and Sexual Activity    Alcohol use: Never    Drug use: Never    Sexual activity: Not Currently

## 2025-04-11 ENCOUNTER — RESULTS FOLLOW-UP (OUTPATIENT)
Dept: INTERNAL MEDICINE CLINIC | Age: 70
End: 2025-04-11

## 2025-04-20 DIAGNOSIS — I25.5 ISCHEMIC CARDIOMYOPATHY: ICD-10-CM

## 2025-04-21 NOTE — TELEPHONE ENCOUNTER
Last OV: 25 - Striet  Next OV:  - Striet  Last Labs: 4/10/25  Last EK/25   Last Filled: 2/10/25 1refill    Gantt advise if appropriate to refill??  Normally PCP manages, correct?

## 2025-04-22 RX ORDER — ALBUTEROL SULFATE 90 UG/1
INHALANT RESPIRATORY (INHALATION)
Qty: 6.7 G | Refills: 0 | Status: SHIPPED | OUTPATIENT
Start: 2025-04-22

## 2025-04-22 NOTE — TELEPHONE ENCOUNTER
Called spoke with patient is aware and needs refill's on:     Brilinta 90 mg last filled 1/17/25 #60 3 R/F.    Jardiance 10 mg last filled 6/26/24 #90 3 R/F    Refills pending.

## 2025-04-25 DIAGNOSIS — R09.82 POST-NASAL DRAINAGE: ICD-10-CM

## 2025-04-25 RX ORDER — FLUTICASONE PROPIONATE 50 MCG
SPRAY, SUSPENSION (ML) NASAL
Qty: 48 G | Refills: 1 | Status: SHIPPED | OUTPATIENT
Start: 2025-04-25

## 2025-04-25 RX ORDER — FLUTICASONE PROPIONATE 50 MCG
SPRAY, SUSPENSION (ML) NASAL
Qty: 16 G | Refills: 0 | Status: SHIPPED | OUTPATIENT
Start: 2025-04-25 | End: 2025-04-25

## 2025-04-25 NOTE — TELEPHONE ENCOUNTER
Received refill request for  fluticasone (FLONASE) 50 MCG/ACT nasal spray  from [facility]  pharmacy.     Last OV: 04/10/25    Next OV: 07/10/25    Last Labs: 04/14/25    Last Filled: 03/20/25

## 2025-05-20 RX ORDER — ALBUTEROL SULFATE 90 UG/1
INHALANT RESPIRATORY (INHALATION)
Qty: 6.7 G | Refills: 0 | OUTPATIENT
Start: 2025-05-20

## 2025-06-17 NOTE — PROGRESS NOTES
[facility]      Cardiology Consult    Frank Guerrero  1955 June 17, 2025    Referring Physician: ABA Herzog MD  Reason for Referral: CAD, ischemic cardiomyopathy     CC: \"    HPI:  The patient is 69 y.o. male with a past medical history significant for hypertension, hyperlipidemia, ischemic cardiomyopathy, and coronary artery disease who presents today for management of heart failure. He was initially seen for a pre-operative risk assessment prior to a colonoscopy scheduled 6/27/22. He reported a history of a prior MI in 2020 while living in [place] but states a coronary angiogram was not performed. He stated he would be treated with medical therapy. A prior stress test was completed 3/2021 showed a large fixed defect with an LVEF of 28%. He denied a prior echocardiogram. He reported a history of gastric ulcers and follows with GI. He completed an echocardiogram that showed a severely reduced LVEF at 25%. He was started medical therapy but unfortunately his repeat echocardiogram showed the EF had not recovered at 20-25%. He was referred to Dr. Bunch and underwent ICD placement 11/21/22. His repeat echocardiogram revealed a LVEFof 20-25%, severely dilated LV, new apical thrombus, and IVCD on rhythm strip. He was started on DOAC. He saw Dr. Bunch in office 1/8/25 with no further recommendations advised. He underwent a left/right heart catheterization resulting in x1 YANI to the LAD with normal filling pressures.     The  phone is used it today's office visit          Past Medical History:   Diagnosis Date    CAD (coronary artery disease)     CAD in native artery 01/16/2025    Hyperlipidemia     Hypertension     Ischemic cardiomyopathy     MI (myocardial infarction) (HCC)      Past Surgical History:   Procedure Laterality Date    CARDIAC DEFIBRILLATOR PLACEMENT      CARDIAC PROCEDURE N/A 1/17/2025    Percutaneous coronary intervention performed by Miki Santillan MD

## 2025-06-18 ENCOUNTER — TELEPHONE (OUTPATIENT)
Dept: CARDIOLOGY CLINIC | Age: 70
End: 2025-06-18

## 2025-06-18 ENCOUNTER — OFFICE VISIT (OUTPATIENT)
Dept: CARDIOLOGY CLINIC | Age: 70
End: 2025-06-18
Payer: COMMERCIAL

## 2025-06-18 VITALS
HEIGHT: 67 IN | WEIGHT: 237 LBS | BODY MASS INDEX: 37.2 KG/M2 | OXYGEN SATURATION: 93 % | SYSTOLIC BLOOD PRESSURE: 102 MMHG | HEART RATE: 71 BPM | DIASTOLIC BLOOD PRESSURE: 64 MMHG

## 2025-06-18 DIAGNOSIS — I25.10 CORONARY ARTERY DISEASE INVOLVING NATIVE CORONARY ARTERY OF NATIVE HEART WITHOUT ANGINA PECTORIS: ICD-10-CM

## 2025-06-18 DIAGNOSIS — I10 ESSENTIAL HYPERTENSION: ICD-10-CM

## 2025-06-18 DIAGNOSIS — I51.3 APICAL MURAL THROMBUS: ICD-10-CM

## 2025-06-18 DIAGNOSIS — E78.5 HYPERLIPIDEMIA LDL GOAL <70: ICD-10-CM

## 2025-06-18 DIAGNOSIS — I25.5 ISCHEMIC CARDIOMYOPATHY: ICD-10-CM

## 2025-06-18 DIAGNOSIS — I50.22 CHRONIC SYSTOLIC HEART FAILURE (HCC): Primary | ICD-10-CM

## 2025-06-18 PROCEDURE — 1123F ACP DISCUSS/DSCN MKR DOCD: CPT | Performed by: INTERNAL MEDICINE

## 2025-06-18 PROCEDURE — 3017F COLORECTAL CA SCREEN DOC REV: CPT | Performed by: INTERNAL MEDICINE

## 2025-06-18 PROCEDURE — G8427 DOCREV CUR MEDS BY ELIG CLIN: HCPCS | Performed by: INTERNAL MEDICINE

## 2025-06-18 PROCEDURE — 3078F DIAST BP <80 MM HG: CPT | Performed by: INTERNAL MEDICINE

## 2025-06-18 PROCEDURE — 99214 OFFICE O/P EST MOD 30 MIN: CPT | Performed by: INTERNAL MEDICINE

## 2025-06-18 PROCEDURE — G8417 CALC BMI ABV UP PARAM F/U: HCPCS | Performed by: INTERNAL MEDICINE

## 2025-06-18 PROCEDURE — 1036F TOBACCO NON-USER: CPT | Performed by: INTERNAL MEDICINE

## 2025-06-18 PROCEDURE — 3074F SYST BP LT 130 MM HG: CPT | Performed by: INTERNAL MEDICINE

## 2025-06-18 RX ORDER — ROSUVASTATIN CALCIUM 40 MG/1
40 TABLET, COATED ORAL DAILY
Qty: 90 TABLET | Refills: 3 | Status: SHIPPED | OUTPATIENT
Start: 2025-06-18

## 2025-06-18 NOTE — PROGRESS NOTES
[facility]      Cardiology Consult    Frank Guerrero  1955 June 18, 2025    Referring Physician: ABA Herzog MD  Reason for Referral: CAD, ischemic cardiomyopathy     CC: \"I am doing okay.\"    HPI:  The patient is 69 y.o. male with a past medical history significant for hypertension, hyperlipidemia, ischemic cardiomyopathy, and coronary artery disease who presents today for management of heart failure. He was initially seen for a pre-operative risk assessment prior to a colonoscopy scheduled 6/27/22. He reported a history of a prior MI in 2020 while living in [place] but states a coronary angiogram was not performed. He stated he would be treated with medical therapy. A prior stress test was completed 3/2021 showed a large fixed defect with an LVEF of 28%. He denied a prior echocardiogram. He reported a history of gastric ulcers and follows with GI. He completed an echocardiogram that showed a severely reduced LVEF at 25%. He was started medical therapy but unfortunately his repeat echocardiogram showed the EF had not recovered at 20-25%. He was referred to Dr. Bunch and underwent ICD placement 11/21/22. His repeat echocardiogram revealed a LVEFof 20-25%, severely dilated LV, new apical thrombus, and IVCD on rhythm strip. He was started on DOAC. He saw Dr. Bunch in office 1/8/25 with no further recommendations advised. He underwent a left/right heart catheterization resulting in x1 YANI to the LAD with normal filling pressures. The  phone is used it today's office visit. He denies any new sounding cardiac complaints. Patient denies exertional chest pain/pressure, dyspnea at rest, SEGAL, PND, orthopnea, palpitations, lightheadedness, weight changes, changes in LE edema, and syncope. Patient reports compliance to his medications.     Past Medical History:   Diagnosis Date    CAD (coronary artery disease)     CAD in native artery 01/16/2025    Hyperlipidemia

## 2025-06-20 NOTE — TELEPHONE ENCOUNTER
Called patient using the language service. He was thinking that his Crestor and Entresto were the same medication. He is willing to restart the Crestor. He will pick it up from the pharmacy tomorrow.

## 2025-06-26 ENCOUNTER — HOSPITAL ENCOUNTER (EMERGENCY)
Age: 70
Discharge: HOME | End: 2025-06-26
Attending: STUDENT IN AN ORGANIZED HEALTH CARE EDUCATION/TRAINING PROGRAM
Payer: COMMERCIAL

## 2025-06-26 VITALS
HEART RATE: 86 BPM | DIASTOLIC BLOOD PRESSURE: 76 MMHG | OXYGEN SATURATION: 95 % | BODY MASS INDEX: 36.74 KG/M2 | WEIGHT: 234.57 LBS | SYSTOLIC BLOOD PRESSURE: 127 MMHG | TEMPERATURE: 98.2 F | RESPIRATION RATE: 19 BRPM

## 2025-06-26 DIAGNOSIS — M54.32 SCIATICA OF LEFT SIDE: Primary | ICD-10-CM

## 2025-06-26 PROCEDURE — 99283 EMERGENCY DEPT VISIT LOW MDM: CPT

## 2025-06-26 PROCEDURE — 6370000000 HC RX 637 (ALT 250 FOR IP): Performed by: STUDENT IN AN ORGANIZED HEALTH CARE EDUCATION/TRAINING PROGRAM

## 2025-06-26 RX ORDER — LIDOCAINE 4 G/G
1 PATCH TOPICAL DAILY
Qty: 30 PATCH | Refills: 0 | Status: SHIPPED | OUTPATIENT
Start: 2025-06-26 | End: 2025-07-26

## 2025-06-26 RX ORDER — DIAZEPAM 5 MG/1
10 TABLET ORAL ONCE
Status: COMPLETED | OUTPATIENT
Start: 2025-06-26 | End: 2025-06-26

## 2025-06-26 RX ORDER — METHYLPREDNISOLONE 4 MG/1
TABLET ORAL
Qty: 1 KIT | Refills: 0 | Status: SHIPPED | OUTPATIENT
Start: 2025-06-26 | End: 2025-07-02

## 2025-06-26 RX ORDER — DEXAMETHASONE 6 MG/1
6 TABLET ORAL ONCE
Status: COMPLETED | OUTPATIENT
Start: 2025-06-26 | End: 2025-06-26

## 2025-06-26 RX ORDER — LIDOCAINE 4 G/G
1 PATCH TOPICAL ONCE
Status: DISCONTINUED | OUTPATIENT
Start: 2025-06-26 | End: 2025-06-26 | Stop reason: HOSPADM

## 2025-06-26 RX ORDER — CYCLOBENZAPRINE HCL 10 MG
10 TABLET ORAL 3 TIMES DAILY PRN
Qty: 30 TABLET | Refills: 0 | Status: SHIPPED | OUTPATIENT
Start: 2025-06-26 | End: 2025-07-06

## 2025-06-26 RX ADMIN — DIAZEPAM 10 MG: 5 TABLET ORAL at 18:39

## 2025-06-26 RX ADMIN — DEXAMETHASONE 6 MG: 6 TABLET ORAL at 18:39

## 2025-06-26 ASSESSMENT — PAIN DESCRIPTION - LOCATION: LOCATION: LEG

## 2025-06-26 ASSESSMENT — PAIN SCALES - GENERAL: PAINLEVEL_OUTOF10: 9

## 2025-06-26 ASSESSMENT — PAIN DESCRIPTION - DESCRIPTORS: DESCRIPTORS: SHARP

## 2025-06-26 ASSESSMENT — PAIN DESCRIPTION - ORIENTATION: ORIENTATION: LEFT

## 2025-06-26 ASSESSMENT — PAIN DESCRIPTION - FREQUENCY: FREQUENCY: CONTINUOUS

## 2025-06-26 ASSESSMENT — PAIN - FUNCTIONAL ASSESSMENT: PAIN_FUNCTIONAL_ASSESSMENT: 0-10

## 2025-06-26 ASSESSMENT — PAIN DESCRIPTION - PAIN TYPE: TYPE: ACUTE PAIN

## 2025-06-26 NOTE — ED NOTES
AVS given and reviewed with patient and/or family. Opportunity to ask questions was given, questions were either not asked or answered. Plan of care reviewed, treatment discussed - symptoms improving or resolved. Patient deemed stable for discharge by ED provider. Appropriate follow up care listed. Patient and/or family verbalized understanding. Return precautions discussed and understood. No further needs voiced, patient OK for discharge.

## 2025-06-26 NOTE — DISCHARGE INSTRUCTIONS
Take your medications as prescribed.   Follow-up with the referral given to you.    Return if you develop any new or worsening symptoms as we discussed today.      THANK YOU for allowing me and the rest of the Emergency Department staff at [facility] to care for you today. We hope that your care has been nothing short of EXCELLENT today. In the near future you may receive a survey in the mail about your visit. Please fill this survey out and return it so that we can continue to provide you with EXCELLENT care.

## 2025-06-26 NOTE — ED PROVIDER NOTES
[facility] EMERGENCY DEPARTMENT      EMERGENCY MEDICINE     Pt Name: Frank Guerrero  MRN: [id number]  Birthdate 1955  Date of evaluation: 6/26/2025  Provider: Jorge Alejandra MD    CHIEF COMPLAINT     Left buttock pain rating to the left lower extremity    HISTORY OF PRESENT ILLNESS   Frank Guerrero is a 69 y.o. male who presents to the emergency department for left buttock pain rating down his left leg.  No recent traumatic injuries or falls been going a week worse last few days no trauma.    No fecal or urinary incontinence.   No genitalia numbness or tingling.   No new numbness or tingling in the lower extremities.    No history of IV drug abuse or injections in their spine.    No recent back surgeries  No recent traumatic injuries or falls.    No fevers or chills.         PASTMEDICAL HISTORY     Past Medical History:   Diagnosis Date    CAD (coronary artery disease)     CAD in native artery 01/16/2025    Hyperlipidemia     Hypertension     Ischemic cardiomyopathy     MI (myocardial infarction) ([facility])        Patient Active Problem List   Diagnosis Code    Ischemic cardiomyopathy I25.5    Nocturnal dyspnea R06.00    Gastroesophageal reflux disease with esophagitis without hemorrhage K21.00    Hiatal hernia K44.9    Coronary artery disease involving native coronary artery of native heart without angina pectoris I25.10    Essential hypertension I10    Language barrier to communication Z78.9    Pure hypercholesterolemia E78.00    Abnormal findings on esophagogastroduodenoscopy (EGD) R19.8    Cardiac defibrillator in situ Z95.810    Symptomatic bradycardia R00.1    Sinus node dysfunction (HCC) I49.5    AV heart block I44.30    Chronic systolic HF (heart failure) (HCC) I50.22    Chronic congestive heart failure (HCC) I50.9    Hyperlipemia E78.5    Chronic systolic heart failure (HCC) I50.22    CAD (coronary artery disease) I25.10    CAD in native artery I25.10    Chronic

## 2025-07-10 ENCOUNTER — OFFICE VISIT (OUTPATIENT)
Dept: INTERNAL MEDICINE CLINIC | Age: 70
End: 2025-07-10
Payer: COMMERCIAL

## 2025-07-10 VITALS
WEIGHT: 236.4 LBS | DIASTOLIC BLOOD PRESSURE: 70 MMHG | SYSTOLIC BLOOD PRESSURE: 116 MMHG | HEIGHT: 67 IN | HEART RATE: 84 BPM | OXYGEN SATURATION: 94 % | BODY MASS INDEX: 37.1 KG/M2

## 2025-07-10 DIAGNOSIS — K21.9 GASTROESOPHAGEAL REFLUX DISEASE WITHOUT ESOPHAGITIS: ICD-10-CM

## 2025-07-10 DIAGNOSIS — Z60.3 LANGUAGE BARRIER AFFECTING HEALTH CARE: ICD-10-CM

## 2025-07-10 DIAGNOSIS — I25.10 CORONARY ARTERY DISEASE INVOLVING NATIVE CORONARY ARTERY OF NATIVE HEART WITHOUT ANGINA PECTORIS: ICD-10-CM

## 2025-07-10 DIAGNOSIS — M54.32 SCIATICA, LEFT SIDE: Primary | ICD-10-CM

## 2025-07-10 DIAGNOSIS — I25.5 ISCHEMIC CARDIOMYOPATHY: ICD-10-CM

## 2025-07-10 DIAGNOSIS — K29.30 CHRONIC SUPERFICIAL GASTRITIS WITHOUT BLEEDING: ICD-10-CM

## 2025-07-10 DIAGNOSIS — Z75.8 LANGUAGE BARRIER AFFECTING HEALTH CARE: ICD-10-CM

## 2025-07-10 DIAGNOSIS — I10 ESSENTIAL HYPERTENSION: ICD-10-CM

## 2025-07-10 DIAGNOSIS — M47.26 OSTEOARTHRITIS OF SPINE WITH RADICULOPATHY, LUMBAR REGION: ICD-10-CM

## 2025-07-10 PROCEDURE — G8417 CALC BMI ABV UP PARAM F/U: HCPCS | Performed by: INTERNAL MEDICINE

## 2025-07-10 PROCEDURE — 3017F COLORECTAL CA SCREEN DOC REV: CPT | Performed by: INTERNAL MEDICINE

## 2025-07-10 PROCEDURE — G2211 COMPLEX E/M VISIT ADD ON: HCPCS | Performed by: INTERNAL MEDICINE

## 2025-07-10 PROCEDURE — 99214 OFFICE O/P EST MOD 30 MIN: CPT | Performed by: INTERNAL MEDICINE

## 2025-07-10 PROCEDURE — 3074F SYST BP LT 130 MM HG: CPT | Performed by: INTERNAL MEDICINE

## 2025-07-10 PROCEDURE — 1123F ACP DISCUSS/DSCN MKR DOCD: CPT | Performed by: INTERNAL MEDICINE

## 2025-07-10 PROCEDURE — G8427 DOCREV CUR MEDS BY ELIG CLIN: HCPCS | Performed by: INTERNAL MEDICINE

## 2025-07-10 PROCEDURE — 1036F TOBACCO NON-USER: CPT | Performed by: INTERNAL MEDICINE

## 2025-07-10 PROCEDURE — 3078F DIAST BP <80 MM HG: CPT | Performed by: INTERNAL MEDICINE

## 2025-07-10 RX ORDER — FAMOTIDINE 20 MG/1
20 TABLET, FILM COATED ORAL 2 TIMES DAILY PRN
Qty: 60 TABLET | Refills: 2 | Status: CANCELLED | OUTPATIENT
Start: 2025-07-10

## 2025-07-10 RX ORDER — PANTOPRAZOLE SODIUM 40 MG/1
40 TABLET, DELAYED RELEASE ORAL
Qty: 90 TABLET | Refills: 1 | Status: SHIPPED | OUTPATIENT
Start: 2025-07-10

## 2025-07-10 RX ORDER — CYCLOBENZAPRINE HCL 10 MG
10 TABLET ORAL 3 TIMES DAILY PRN
Qty: 90 TABLET | Refills: 0 | Status: SHIPPED | OUTPATIENT
Start: 2025-07-10 | End: 2025-08-09

## 2025-07-10 SDOH — SOCIAL STABILITY - SOCIAL INSECURITY: ACCULTURATION DIFFICULTY: Z60.3

## 2025-07-10 ASSESSMENT — ENCOUNTER SYMPTOMS
WHEEZING: 0
CHEST TIGHTNESS: 0
PHOTOPHOBIA: 0
BACK PAIN: 1
ABDOMINAL PAIN: 0

## 2025-07-10 NOTE — PROGRESS NOTES
Frank Carlson Cesar  1955  male  69 y.o.    SUBJECTIVE:    Chief Complaint   Patient presents with    3 Month Follow-Up     Patient is here for a 3 month follow up, complaints of left leg pain, states its been going on for about a month, he was seen in ED but it has still been causing him pain.    Interpretative Services Used: Kori ID #[id number] and Kellie #[id number]       History of Present Illness  Follow-up visit for chronic problems.    Patient was recently seen in the emergency room for lower back pain radiated to the left thigh and lateral leg.  Current medication Flexeril and diclofenac gel has been helping.  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  Patient denies any bladder or bowel incontinence.  Denies any leg weakness.    History of ischemic cardiomyopathy and hyperlipidemia.  Now getting injectable cholesterol medicine along with Crestor.  Patient denies chest pain palpitation dizziness shortness of breath.    History of gastritis symptom has been stable with current pantoprazole.  Requesting refill.         Past Medical History:   Diagnosis Date    CAD (coronary artery disease)     CAD in native artery 01/16/2025    Hyperlipidemia     Hypertension     Ischemic cardiomyopathy     MI (myocardial infarction) (HCC)      Past Surgical History:   Procedure Laterality Date    CARDIAC DEFIBRILLATOR PLACEMENT      CARDIAC PROCEDURE N/A 1/17/2025    Percutaneous coronary intervention performed by Miki Santillan MD at [facility] CARDIAC CATH LAB    CARDIAC PROCEDURE N/A 1/16/2025    Left and right heart cath / coronary angiography performed by Miki Santillan MD at [facility] CARDIAC CATH LAB    UPPER GASTROINTESTINAL ENDOSCOPY N/A 10/24/2024    ESOPHAGOGASTRODUODENOSCOPY BIOPSY small bowel bx r/o celiac gastric bx eval gastritis and r/o HP gastric nodule bx performed by Yanira Hewitt MD at [facility] ENDOSCOPY     Social History     Socioeconomic History    Marital status: Single     Spouse name: None    Number of

## 2025-07-10 NOTE — CONSULTS
Session ID: [id number]  Session Duration: 22 minutes  Language: Hong Konger   ID: #[id number]   Name: Arlinege: Hong Konger   ID: #[id number]   Name: Kori

## 2025-07-11 LAB
ALBUMIN SERPL-MCNC: 4.1 G/DL (ref 3.4–5)
ALP SERPL-CCNC: 89 U/L (ref 40–129)
ALT SERPL-CCNC: 25 U/L (ref 10–40)
AST SERPL-CCNC: 22 U/L (ref 15–37)
BILIRUB DIRECT SERPL-MCNC: 0.2 MG/DL (ref 0–0.3)
BILIRUB INDIRECT SERPL-MCNC: 0.2 MG/DL (ref 0–1)
BILIRUB SERPL-MCNC: 0.4 MG/DL (ref 0–1)
CHOLEST SERPL-MCNC: 147 MG/DL (ref 0–199)
HDLC SERPL-MCNC: 41 MG/DL (ref 40–60)
LDL CHOLESTEROL: 69 MG/DL
PROT SERPL-MCNC: 6.8 G/DL (ref 6.4–8.2)
TRIGL SERPL-MCNC: 187 MG/DL (ref 0–150)
VLDLC SERPL CALC-MCNC: 37 MG/DL

## 2025-07-15 PROCEDURE — 93297 REM INTERROG DEV EVAL ICPMS: CPT | Performed by: NURSE PRACTITIONER

## 2025-07-29 ENCOUNTER — TELEPHONE (OUTPATIENT)
Dept: INFUSION THERAPY | Age: 70
End: 2025-07-29

## 2025-07-30 NOTE — TELEPHONE ENCOUNTER
Called patient using Language Translation Services. Message was left on voicemail confirming his appointment for Wednesday 8/6/25 @ 8:15 AM for his Leqvio injection. I saw he had gotten labs done already 3 weeks ago.  left the Infusion Department phone number if he needs to ask any questions

## 2025-08-01 ENCOUNTER — TELEPHONE (OUTPATIENT)
Dept: CARDIOLOGY CLINIC | Age: 70
End: 2025-08-01

## 2025-08-05 ENCOUNTER — CLINICAL DOCUMENTATION (OUTPATIENT)
Facility: HOSPITAL | Age: 70
End: 2025-08-05

## 2025-08-06 ENCOUNTER — HOSPITAL ENCOUNTER (OUTPATIENT)
Dept: INFUSION THERAPY | Age: 70
Setting detail: INFUSION SERIES
Discharge: HOME | End: 2025-08-06
Payer: COMMERCIAL

## 2025-08-06 ENCOUNTER — HOSPITAL ENCOUNTER (OUTPATIENT)
Dept: PHYSICAL THERAPY | Age: 70
Setting detail: THERAPIES SERIES
Discharge: HOME | End: 2025-08-06
Attending: INTERNAL MEDICINE
Payer: COMMERCIAL

## 2025-08-06 VITALS
TEMPERATURE: 98.1 F | HEIGHT: 67 IN | RESPIRATION RATE: 16 BRPM | HEART RATE: 70 BPM | OXYGEN SATURATION: 98 % | BODY MASS INDEX: 36.1 KG/M2 | DIASTOLIC BLOOD PRESSURE: 70 MMHG | WEIGHT: 230 LBS | SYSTOLIC BLOOD PRESSURE: 125 MMHG

## 2025-08-06 DIAGNOSIS — E78.00 PURE HYPERCHOLESTEROLEMIA: ICD-10-CM

## 2025-08-06 DIAGNOSIS — I25.5 ISCHEMIC CARDIOMYOPATHY: ICD-10-CM

## 2025-08-06 DIAGNOSIS — E78.5 HYPERLIPIDEMIA, UNSPECIFIED HYPERLIPIDEMIA TYPE: Primary | ICD-10-CM

## 2025-08-06 DIAGNOSIS — I25.10 CORONARY ARTERY DISEASE INVOLVING NATIVE CORONARY ARTERY OF NATIVE HEART WITHOUT ANGINA PECTORIS: ICD-10-CM

## 2025-08-06 DIAGNOSIS — M53.87 SCIATICA ASSOCIATED WITH DISORDER OF LUMBOSACRAL SPINE: Primary | ICD-10-CM

## 2025-08-06 PROCEDURE — 96372 THER/PROPH/DIAG INJ SC/IM: CPT

## 2025-08-06 PROCEDURE — 6360000002 HC RX W HCPCS: Performed by: INTERNAL MEDICINE

## 2025-08-06 PROCEDURE — 97161 PT EVAL LOW COMPLEX 20 MIN: CPT | Performed by: PHYSICAL THERAPIST

## 2025-08-06 PROCEDURE — 97140 MANUAL THERAPY 1/> REGIONS: CPT | Performed by: PHYSICAL THERAPIST

## 2025-08-06 RX ORDER — DIPHENHYDRAMINE HYDROCHLORIDE 50 MG/ML
50 INJECTION, SOLUTION INTRAMUSCULAR; INTRAVENOUS
OUTPATIENT
Start: 2025-08-06

## 2025-08-06 RX ORDER — HYDROCORTISONE SODIUM SUCCINATE 100 MG/2ML
100 INJECTION INTRAMUSCULAR; INTRAVENOUS
OUTPATIENT
Start: 2026-02-04

## 2025-08-06 RX ORDER — DIPHENHYDRAMINE HYDROCHLORIDE 50 MG/ML
50 INJECTION, SOLUTION INTRAMUSCULAR; INTRAVENOUS
OUTPATIENT
Start: 2026-02-04

## 2025-08-06 RX ORDER — ALBUTEROL SULFATE 90 UG/1
4 INHALANT RESPIRATORY (INHALATION) PRN
OUTPATIENT
Start: 2026-02-04

## 2025-08-06 RX ORDER — SODIUM CHLORIDE 9 MG/ML
INJECTION, SOLUTION INTRAVENOUS CONTINUOUS
OUTPATIENT
Start: 2025-08-06

## 2025-08-06 RX ORDER — ACETAMINOPHEN 325 MG/1
650 TABLET ORAL
OUTPATIENT
Start: 2026-02-04

## 2025-08-06 RX ORDER — ONDANSETRON 2 MG/ML
8 INJECTION INTRAMUSCULAR; INTRAVENOUS
OUTPATIENT
Start: 2025-08-06

## 2025-08-06 RX ORDER — HYDROCORTISONE SODIUM SUCCINATE 100 MG/2ML
100 INJECTION INTRAMUSCULAR; INTRAVENOUS
OUTPATIENT
Start: 2025-08-06

## 2025-08-06 RX ORDER — ACETAMINOPHEN 325 MG/1
650 TABLET ORAL
OUTPATIENT
Start: 2025-08-06

## 2025-08-06 RX ORDER — EPINEPHRINE 1 MG/ML
0.3 INJECTION, SOLUTION, CONCENTRATE INTRAVENOUS PRN
OUTPATIENT
Start: 2026-02-04

## 2025-08-06 RX ORDER — EPINEPHRINE 1 MG/ML
0.3 INJECTION, SOLUTION, CONCENTRATE INTRAVENOUS PRN
OUTPATIENT
Start: 2025-08-06

## 2025-08-06 RX ORDER — SODIUM CHLORIDE 9 MG/ML
INJECTION, SOLUTION INTRAVENOUS CONTINUOUS
OUTPATIENT
Start: 2026-02-04

## 2025-08-06 RX ORDER — ALBUTEROL SULFATE 90 UG/1
4 INHALANT RESPIRATORY (INHALATION) PRN
OUTPATIENT
Start: 2025-08-06

## 2025-08-06 RX ORDER — ONDANSETRON 2 MG/ML
8 INJECTION INTRAMUSCULAR; INTRAVENOUS
OUTPATIENT
Start: 2026-02-04

## 2025-08-06 RX ADMIN — INCLISIRAN 284 MG: 284 INJECTION, SOLUTION SUBCUTANEOUS at 08:51

## 2025-08-08 ENCOUNTER — HOSPITAL ENCOUNTER (EMERGENCY)
Age: 70
Discharge: HOME | End: 2025-08-08
Attending: STUDENT IN AN ORGANIZED HEALTH CARE EDUCATION/TRAINING PROGRAM
Payer: COMMERCIAL

## 2025-08-08 VITALS
WEIGHT: 236.77 LBS | SYSTOLIC BLOOD PRESSURE: 118 MMHG | HEART RATE: 77 BPM | OXYGEN SATURATION: 95 % | HEIGHT: 67 IN | BODY MASS INDEX: 37.16 KG/M2 | DIASTOLIC BLOOD PRESSURE: 86 MMHG | RESPIRATION RATE: 19 BRPM | TEMPERATURE: 98.1 F

## 2025-08-08 DIAGNOSIS — S39.012A STRAIN OF LUMBAR REGION, INITIAL ENCOUNTER: Primary | ICD-10-CM

## 2025-08-08 PROCEDURE — 99284 EMERGENCY DEPT VISIT MOD MDM: CPT

## 2025-08-08 PROCEDURE — 6370000000 HC RX 637 (ALT 250 FOR IP): Performed by: STUDENT IN AN ORGANIZED HEALTH CARE EDUCATION/TRAINING PROGRAM

## 2025-08-08 PROCEDURE — 6360000002 HC RX W HCPCS: Performed by: STUDENT IN AN ORGANIZED HEALTH CARE EDUCATION/TRAINING PROGRAM

## 2025-08-08 PROCEDURE — 96372 THER/PROPH/DIAG INJ SC/IM: CPT

## 2025-08-08 RX ORDER — KETOROLAC TROMETHAMINE 15 MG/ML
15 INJECTION, SOLUTION INTRAMUSCULAR; INTRAVENOUS ONCE
Status: DISCONTINUED | OUTPATIENT
Start: 2025-08-08 | End: 2025-08-08

## 2025-08-08 RX ORDER — LIDOCAINE 4 G/G
1 PATCH TOPICAL DAILY
Qty: 5 PATCH | Refills: 0 | Status: SHIPPED | OUTPATIENT
Start: 2025-08-08 | End: 2025-08-13

## 2025-08-08 RX ORDER — METHOCARBAMOL 750 MG/1
750 TABLET, FILM COATED ORAL 4 TIMES DAILY
Qty: 40 TABLET | Refills: 0 | Status: SHIPPED | OUTPATIENT
Start: 2025-08-08 | End: 2025-08-18

## 2025-08-08 RX ORDER — METHOCARBAMOL 750 MG/1
1500 TABLET, FILM COATED ORAL ONCE
Status: COMPLETED | OUTPATIENT
Start: 2025-08-08 | End: 2025-08-08

## 2025-08-08 RX ORDER — KETOROLAC TROMETHAMINE 15 MG/ML
15 INJECTION, SOLUTION INTRAMUSCULAR; INTRAVENOUS ONCE
Status: COMPLETED | OUTPATIENT
Start: 2025-08-08 | End: 2025-08-08

## 2025-08-08 RX ORDER — LIDOCAINE 4 G/G
1 PATCH TOPICAL ONCE
Status: DISCONTINUED | OUTPATIENT
Start: 2025-08-08 | End: 2025-08-08 | Stop reason: HOSPADM

## 2025-08-08 RX ADMIN — KETOROLAC TROMETHAMINE 15 MG: 15 INJECTION, SOLUTION INTRAMUSCULAR; INTRAVENOUS at 13:12

## 2025-08-08 RX ADMIN — METHOCARBAMOL 1500 MG: 750 TABLET ORAL at 13:11

## 2025-08-08 ASSESSMENT — PAIN DESCRIPTION - ORIENTATION
ORIENTATION: LOWER
ORIENTATION: LOWER

## 2025-08-08 ASSESSMENT — PAIN SCALES - GENERAL
PAINLEVEL_OUTOF10: 8
PAINLEVEL_OUTOF10: 3
PAINLEVEL_OUTOF10: 6
PAINLEVEL_OUTOF10: 7

## 2025-08-08 ASSESSMENT — PAIN - FUNCTIONAL ASSESSMENT
PAIN_FUNCTIONAL_ASSESSMENT: 0-10
PAIN_FUNCTIONAL_ASSESSMENT: ACTIVITIES ARE NOT PREVENTED

## 2025-08-08 ASSESSMENT — PAIN DESCRIPTION - LOCATION
LOCATION: BACK
LOCATION: BACK

## 2025-08-08 ASSESSMENT — PAIN DESCRIPTION - PAIN TYPE: TYPE: ACUTE PAIN

## 2025-08-08 ASSESSMENT — PAIN DESCRIPTION - DESCRIPTORS: DESCRIPTORS: DISCOMFORT

## 2025-08-14 ENCOUNTER — HOSPITAL ENCOUNTER (OUTPATIENT)
Dept: PHYSICAL THERAPY | Age: 70
Setting detail: THERAPIES SERIES
Discharge: HOME | End: 2025-08-14
Attending: INTERNAL MEDICINE
Payer: COMMERCIAL

## 2025-08-14 PROCEDURE — 97140 MANUAL THERAPY 1/> REGIONS: CPT | Performed by: PHYSICAL THERAPIST

## 2025-08-14 PROCEDURE — 97110 THERAPEUTIC EXERCISES: CPT | Performed by: PHYSICAL THERAPIST

## 2025-08-18 ENCOUNTER — TELEPHONE (OUTPATIENT)
Dept: CARDIOLOGY CLINIC | Age: 70
End: 2025-08-18

## 2025-08-21 ENCOUNTER — APPOINTMENT (OUTPATIENT)
Dept: PHYSICAL THERAPY | Age: 70
End: 2025-08-21
Attending: INTERNAL MEDICINE
Payer: COMMERCIAL

## (undated) DEVICE — CATH BLLN ANGIO 3X12MM SC EUPHORA RX

## (undated) DEVICE — GLIDESHEATH SLENDER NITINOL HYDROPHILIC COATED INTRODUCER SHEATH: Brand: GLIDESHEATH SLENDER

## (undated) DEVICE — FORCEPS BX L240CM JAW DIA2.8MM L CAP W/ NDL MIC MESH TOOTH

## (undated) DEVICE — CATH BLLN ANGIO 4.50X20MM NC EUPHORIA RX

## (undated) DEVICE — CATHETER ANGIO 5FR L100CM GRY S STL NYL JL3.5 3 SEG BRAID L

## (undated) DEVICE — CATHETER GUID 6FR L100CM DIA0.071IN NYL SHFT EBU3.5

## (undated) DEVICE — SWAN-GANZ TRUE SIZE THERMODULTION CATHETER, 5F: Brand: SWAN-GANZ TRUE SIZE

## (undated) DEVICE — TR BAND RADIAL ARTERY COMPRESSION DEVICE: Brand: TR BAND

## (undated) DEVICE — CORONARY IMAGING CATHETER: Brand: OPTICROSS™ 6 HD

## (undated) DEVICE — CATHETER ANGIO 5FR L100CM GRY S STL NYL JR4 3 SEG BRAID L

## (undated) DEVICE — RUNTHROUGH NS EXTRA FLOPPY PTCA GUIDEWIRE: Brand: RUNTHROUGH

## (undated) DEVICE — GUIDEWIRE VASC L260CM DIA0.035IN RAD 3MM J TIP L7CM PTFE